# Patient Record
Sex: MALE | NOT HISPANIC OR LATINO | URBAN - METROPOLITAN AREA
[De-identification: names, ages, dates, MRNs, and addresses within clinical notes are randomized per-mention and may not be internally consistent; named-entity substitution may affect disease eponyms.]

---

## 2021-11-01 ENCOUNTER — EMERGENCY (EMERGENCY)
Facility: HOSPITAL | Age: 56
LOS: 0 days | Discharge: HOME | End: 2021-11-01
Attending: EMERGENCY MEDICINE
Payer: MEDICAID

## 2021-11-01 VITALS
TEMPERATURE: 98 F | SYSTOLIC BLOOD PRESSURE: 104 MMHG | HEIGHT: 75 IN | DIASTOLIC BLOOD PRESSURE: 80 MMHG | RESPIRATION RATE: 20 BRPM | WEIGHT: 220.02 LBS | HEART RATE: 120 BPM | OXYGEN SATURATION: 100 %

## 2021-11-01 VITALS
HEART RATE: 93 BPM | OXYGEN SATURATION: 100 % | TEMPERATURE: 100 F | SYSTOLIC BLOOD PRESSURE: 123 MMHG | DIASTOLIC BLOOD PRESSURE: 66 MMHG | RESPIRATION RATE: 17 BRPM

## 2021-11-01 DIAGNOSIS — R00.0 TACHYCARDIA, UNSPECIFIED: ICD-10-CM

## 2021-11-01 DIAGNOSIS — M54.50 LOW BACK PAIN, UNSPECIFIED: ICD-10-CM

## 2021-11-01 DIAGNOSIS — R19.00 INTRA-ABDOMINAL AND PELVIC SWELLING, MASS AND LUMP, UNSPECIFIED SITE: ICD-10-CM

## 2021-11-01 DIAGNOSIS — Z98.89 OTHER SPECIFIED POSTPROCEDURAL STATES: Chronic | ICD-10-CM

## 2021-11-01 DIAGNOSIS — N39.0 URINARY TRACT INFECTION, SITE NOT SPECIFIED: ICD-10-CM

## 2021-11-01 DIAGNOSIS — Z90.5 ACQUIRED ABSENCE OF KIDNEY: ICD-10-CM
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
## 2021-11-01 DIAGNOSIS — A59.9 TRICHOMONIASIS, UNSPECIFIED: ICD-10-CM
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
## 2021-11-01 DIAGNOSIS — Z87.442 PERSONAL HISTORY OF URINARY CALCULI: ICD-10-CM

## 2021-11-01 DIAGNOSIS — Z87.828 PERSONAL HISTORY OF OTHER (HEALED) PHYSICAL INJURY AND TRAUMA: ICD-10-CM

## 2021-11-01 LAB
ALBUMIN SERPL ELPH-MCNC: 2.9 G/DL — LOW (ref 3.3–5)
ALP SERPL-CCNC: 63 U/L — SIGNIFICANT CHANGE UP (ref 40–120)
ALT FLD-CCNC: 19 U/L — SIGNIFICANT CHANGE UP (ref 12–78)
ANION GAP SERPL CALC-SCNC: 6 MMOL/L — SIGNIFICANT CHANGE UP (ref 5–17)
APPEARANCE UR: ABNORMAL
AST SERPL-CCNC: 19 U/L — SIGNIFICANT CHANGE UP (ref 15–37)
BACTERIA # UR AUTO: ABNORMAL
BASOPHILS # BLD AUTO: 0.03 K/UL — SIGNIFICANT CHANGE UP (ref 0–0.2)
BASOPHILS NFR BLD AUTO: 0.3 % — SIGNIFICANT CHANGE UP (ref 0–2)
BILIRUB SERPL-MCNC: 0.6 MG/DL — SIGNIFICANT CHANGE UP (ref 0.2–1.2)
BILIRUB UR-MCNC: NEGATIVE — SIGNIFICANT CHANGE UP
BUN SERPL-MCNC: 58 MG/DL — HIGH (ref 7–23)
CALCIUM SERPL-MCNC: 9.1 MG/DL — SIGNIFICANT CHANGE UP (ref 8.5–10.1)
CHLORIDE SERPL-SCNC: 114 MMOL/L — HIGH (ref 96–108)
CO2 SERPL-SCNC: 20 MMOL/L — LOW (ref 22–31)
COLOR SPEC: YELLOW — SIGNIFICANT CHANGE UP
COMMENT - URINE: SIGNIFICANT CHANGE UP
CREAT SERPL-MCNC: 4.12 MG/DL — HIGH (ref 0.5–1.3)
DIFF PNL FLD: ABNORMAL
EOSINOPHIL # BLD AUTO: 0.09 K/UL — SIGNIFICANT CHANGE UP (ref 0–0.5)
EOSINOPHIL NFR BLD AUTO: 0.9 % — SIGNIFICANT CHANGE UP (ref 0–6)
EPI CELLS # UR: SIGNIFICANT CHANGE UP
GLUCOSE SERPL-MCNC: 160 MG/DL — HIGH (ref 70–99)
GLUCOSE UR QL: NEGATIVE MG/DL — SIGNIFICANT CHANGE UP
GRAN CASTS # UR COMP ASSIST: ABNORMAL /LPF
HCT VFR BLD CALC: 40.2 % — SIGNIFICANT CHANGE UP (ref 39–50)
HGB BLD-MCNC: 12.8 G/DL — LOW (ref 13–17)
IMM GRANULOCYTES NFR BLD AUTO: 0.5 % — SIGNIFICANT CHANGE UP (ref 0–1.5)
KETONES UR-MCNC: ABNORMAL
LEUKOCYTE ESTERASE UR-ACNC: ABNORMAL
LYMPHOCYTES # BLD AUTO: 0.63 K/UL — LOW (ref 1–3.3)
LYMPHOCYTES # BLD AUTO: 6.5 % — LOW (ref 13–44)
MCHC RBC-ENTMCNC: 31.8 GM/DL — LOW (ref 32–36)
MCHC RBC-ENTMCNC: 31.8 PG — SIGNIFICANT CHANGE UP (ref 27–34)
MCV RBC AUTO: 99.8 FL — SIGNIFICANT CHANGE UP (ref 80–100)
MONOCYTES # BLD AUTO: 0.69 K/UL — SIGNIFICANT CHANGE UP (ref 0–0.9)
MONOCYTES NFR BLD AUTO: 7.1 % — SIGNIFICANT CHANGE UP (ref 2–14)
NEUTROPHILS # BLD AUTO: 8.24 K/UL — HIGH (ref 1.8–7.4)
NEUTROPHILS NFR BLD AUTO: 84.7 % — HIGH (ref 43–77)
NITRITE UR-MCNC: NEGATIVE — SIGNIFICANT CHANGE UP
NRBC # BLD: 0 /100 WBCS — SIGNIFICANT CHANGE UP (ref 0–0)
PH UR: 5 — SIGNIFICANT CHANGE UP (ref 5–8)
PLATELET # BLD AUTO: 217 K/UL — SIGNIFICANT CHANGE UP (ref 150–400)
POTASSIUM SERPL-MCNC: 5.2 MMOL/L — SIGNIFICANT CHANGE UP (ref 3.5–5.3)
POTASSIUM SERPL-SCNC: 5.2 MMOL/L — SIGNIFICANT CHANGE UP (ref 3.5–5.3)
PROT SERPL-MCNC: 7.7 GM/DL — SIGNIFICANT CHANGE UP (ref 6–8.3)
PROT UR-MCNC: 100 MG/DL
RBC # BLD: 4.03 M/UL — LOW (ref 4.2–5.8)
RBC # FLD: 15.8 % — HIGH (ref 10.3–14.5)
RBC CASTS # UR COMP ASSIST: >50 /HPF (ref 0–4)
SODIUM SERPL-SCNC: 140 MMOL/L — SIGNIFICANT CHANGE UP (ref 135–145)
SP GR SPEC: 1.01 — SIGNIFICANT CHANGE UP (ref 1.01–1.02)
UROBILINOGEN FLD QL: NEGATIVE MG/DL — SIGNIFICANT CHANGE UP
WBC # BLD: 9.73 K/UL — SIGNIFICANT CHANGE UP (ref 3.8–10.5)
WBC # FLD AUTO: 9.73 K/UL — SIGNIFICANT CHANGE UP (ref 3.8–10.5)
WBC UR QL: ABNORMAL

## 2021-11-01 PROCEDURE — 93010 ELECTROCARDIOGRAM REPORT: CPT

## 2021-11-01 PROCEDURE — 74176 CT ABD & PELVIS W/O CONTRAST: CPT | Mod: 26,MA

## 2021-11-01 PROCEDURE — 99285 EMERGENCY DEPT VISIT HI MDM: CPT

## 2021-11-01 PROCEDURE — 72110 X-RAY EXAM L-2 SPINE 4/>VWS: CPT | Mod: 26

## 2021-11-01 RX ORDER — CEFTRIAXONE 500 MG/1
1000 INJECTION, POWDER, FOR SOLUTION INTRAMUSCULAR; INTRAVENOUS ONCE
Refills: 0 | Status: COMPLETED | OUTPATIENT
Start: 2021-11-01 | End: 2021-11-01

## 2021-11-01 RX ORDER — METHOCARBAMOL 500 MG/1
1000 TABLET, FILM COATED ORAL ONCE
Refills: 0 | Status: COMPLETED | OUTPATIENT
Start: 2021-11-01 | End: 2021-11-01

## 2021-11-01 RX ORDER — ACETAMINOPHEN 500 MG
650 TABLET ORAL ONCE
Refills: 0 | Status: COMPLETED | OUTPATIENT
Start: 2021-11-01 | End: 2021-11-01

## 2021-11-01 RX ORDER — CEPHALEXIN 500 MG
1 CAPSULE ORAL
Qty: 14 | Refills: 0
Start: 2021-11-01 | End: 2021-11-07

## 2021-11-01 RX ADMIN — METHOCARBAMOL 1000 MILLIGRAM(S): 500 TABLET, FILM COATED ORAL at 20:38

## 2021-11-01 RX ADMIN — Medication 650 MILLIGRAM(S): at 21:35

## 2021-11-01 RX ADMIN — Medication 650 MILLIGRAM(S): at 20:37

## 2021-11-01 RX ADMIN — CEFTRIAXONE 100 MILLIGRAM(S): 500 INJECTION, POWDER, FOR SOLUTION INTRAMUSCULAR; INTRAVENOUS at 20:37

## 2021-11-01 RX ADMIN — CEFTRIAXONE 1000 MILLIGRAM(S): 500 INJECTION, POWDER, FOR SOLUTION INTRAMUSCULAR; INTRAVENOUS at 21:35

## 2021-11-01 NOTE — ED PROVIDER NOTE - NSICDXFAMILYHX_GEN_ALL_CORE_FT
FAMILY HISTORY:  Father  Still living? Unknown  Family history of colon cancer, Age at diagnosis: Age Unknown  Family history of pancreatic cancer, Age at diagnosis: Age Unknown    Mother  Still living? Yes, Estimated age: Age Unknown  Family history of diabetes mellitus (DM), Age at diagnosis: Age Unknown    Sibling  Still living? Unknown  Family history of renal cell carcinoma, Age at diagnosis: Age Unknown

## 2021-11-01 NOTE — ED PROVIDER NOTE - NSFOLLOWUPINSTRUCTIONS_ED_ALL_ED_FT
1) Take tylenol for pain  2) Follow-up with the cancer center  3) Follow up with your primary care doctor  4) Return to the ER for worsening or concerning symptoms

## 2021-11-01 NOTE — ED ADULT NURSE NOTE - OBJECTIVE STATEMENT
55 YO M here for back pain, placed PIV sent labs and pt ambulated to and from bathroom, grunting when walking more than 25 ft. gave urine sample.  sent.
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
## 2021-11-01 NOTE — ED PROVIDER NOTE - CARE PLAN
1 Principal Discharge DX:	Back pain  Secondary Diagnosis:	UTI (urinary tract infection)  Secondary Diagnosis:	Infection due to trichomonas  Secondary Diagnosis:	Retroperitoneal mass

## 2021-11-01 NOTE — ED PROVIDER NOTE - NSFOLLOWUPCLINICS_GEN_ALL_ED_FT
[facility]  Hematology/Oncology  [address] [id number]  Phone: [phone]  Fax:
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
## 2021-11-01 NOTE — ED PROVIDER NOTE - PATIENT PORTAL LINK FT
You can access the FollowMyHealth Patient Portal offered by [facility] by registering at the following website: [email or web address]. By joining [employer]’s FollowMyHealth portal, you will also be able to view your health information using other applications (apps) compatible with our system.

## 2021-11-24 ENCOUNTER — INPATIENT (INPATIENT)
Facility: HOSPITAL | Age: 56
LOS: 5 days | Discharge: HOME | End: 2021-11-30
Attending: INTERNAL MEDICINE | Admitting: INTERNAL MEDICINE
Payer: MEDICAID

## 2021-11-24 VITALS
HEIGHT: 75 IN | TEMPERATURE: 98 F | DIASTOLIC BLOOD PRESSURE: 92 MMHG | SYSTOLIC BLOOD PRESSURE: 129 MMHG | HEART RATE: 83 BPM | OXYGEN SATURATION: 98 % | WEIGHT: 229.94 LBS | RESPIRATION RATE: 17 BRPM

## 2021-11-24 DIAGNOSIS — Z98.89 OTHER SPECIFIED POSTPROCEDURAL STATES: Chronic | ICD-10-CM

## 2021-11-24 DIAGNOSIS — R09.89 OTHER SPECIFIED SYMPTOMS AND SIGNS INVOLVING THE CIRCULATORY AND RESPIRATORY SYSTEMS: ICD-10-CM

## 2021-11-24 LAB
ALBUMIN SERPL ELPH-MCNC: 2.8 G/DL — LOW (ref 3.3–5)
ALP SERPL-CCNC: 60 U/L — SIGNIFICANT CHANGE UP (ref 40–120)
ALT FLD-CCNC: 21 U/L — SIGNIFICANT CHANGE UP (ref 12–78)
ANION GAP SERPL CALC-SCNC: 4 MMOL/L — LOW (ref 5–17)
APPEARANCE UR: ABNORMAL
APTT BLD: 138.6 SEC — CRITICAL HIGH (ref 27.5–35.5)
APTT BLD: 32.2 SEC — SIGNIFICANT CHANGE UP (ref 27.5–35.5)
AST SERPL-CCNC: 18 U/L — SIGNIFICANT CHANGE UP (ref 15–37)
BACTERIA # UR AUTO: ABNORMAL
BASOPHILS # BLD AUTO: 0.05 K/UL — SIGNIFICANT CHANGE UP (ref 0–0.2)
BASOPHILS NFR BLD AUTO: 0.6 % — SIGNIFICANT CHANGE UP (ref 0–2)
BILIRUB SERPL-MCNC: 0.3 MG/DL — SIGNIFICANT CHANGE UP (ref 0.2–1.2)
BILIRUB UR-MCNC: NEGATIVE — SIGNIFICANT CHANGE UP
BUN SERPL-MCNC: 41 MG/DL — HIGH (ref 7–23)
CALCIUM SERPL-MCNC: 8.6 MG/DL — SIGNIFICANT CHANGE UP (ref 8.5–10.1)
CHLORIDE SERPL-SCNC: 115 MMOL/L — HIGH (ref 96–108)
CO2 SERPL-SCNC: 23 MMOL/L — SIGNIFICANT CHANGE UP (ref 22–31)
COLOR SPEC: YELLOW — SIGNIFICANT CHANGE UP
CREAT SERPL-MCNC: 2.91 MG/DL — HIGH (ref 0.5–1.3)
DIFF PNL FLD: ABNORMAL
EOSINOPHIL # BLD AUTO: 0.39 K/UL — SIGNIFICANT CHANGE UP (ref 0–0.5)
EOSINOPHIL NFR BLD AUTO: 4.6 % — SIGNIFICANT CHANGE UP (ref 0–6)
EPI CELLS # UR: SIGNIFICANT CHANGE UP
FLUAV AG NPH QL: SIGNIFICANT CHANGE UP
FLUBV AG NPH QL: SIGNIFICANT CHANGE UP
GLUCOSE SERPL-MCNC: 98 MG/DL — SIGNIFICANT CHANGE UP (ref 70–99)
GLUCOSE UR QL: NEGATIVE MG/DL — SIGNIFICANT CHANGE UP
HCT VFR BLD CALC: 31.6 % — LOW (ref 39–50)
HCT VFR BLD CALC: 34.7 % — LOW (ref 39–50)
HGB BLD-MCNC: 10.7 G/DL — LOW (ref 13–17)
HGB BLD-MCNC: 9.8 G/DL — LOW (ref 13–17)
IMM GRANULOCYTES NFR BLD AUTO: 0.4 % — SIGNIFICANT CHANGE UP (ref 0–1.5)
INR BLD: 1.12 RATIO — SIGNIFICANT CHANGE UP (ref 0.88–1.16)
KETONES UR-MCNC: NEGATIVE — SIGNIFICANT CHANGE UP
LEUKOCYTE ESTERASE UR-ACNC: ABNORMAL
LYMPHOCYTES # BLD AUTO: 1.59 K/UL — SIGNIFICANT CHANGE UP (ref 1–3.3)
LYMPHOCYTES # BLD AUTO: 18.6 % — SIGNIFICANT CHANGE UP (ref 13–44)
MAGNESIUM SERPL-MCNC: 2.2 MG/DL — SIGNIFICANT CHANGE UP (ref 1.6–2.6)
MCHC RBC-ENTMCNC: 30.8 GM/DL — LOW (ref 32–36)
MCHC RBC-ENTMCNC: 31 GM/DL — LOW (ref 32–36)
MCHC RBC-ENTMCNC: 31.5 PG — SIGNIFICANT CHANGE UP (ref 27–34)
MCHC RBC-ENTMCNC: 31.5 PG — SIGNIFICANT CHANGE UP (ref 27–34)
MCV RBC AUTO: 101.6 FL — HIGH (ref 80–100)
MCV RBC AUTO: 102.1 FL — HIGH (ref 80–100)
MONOCYTES # BLD AUTO: 0.65 K/UL — SIGNIFICANT CHANGE UP (ref 0–0.9)
MONOCYTES NFR BLD AUTO: 7.6 % — SIGNIFICANT CHANGE UP (ref 2–14)
NEUTROPHILS # BLD AUTO: 5.85 K/UL — SIGNIFICANT CHANGE UP (ref 1.8–7.4)
NEUTROPHILS NFR BLD AUTO: 68.2 % — SIGNIFICANT CHANGE UP (ref 43–77)
NITRITE UR-MCNC: NEGATIVE — SIGNIFICANT CHANGE UP
NRBC # BLD: 0 /100 WBCS — SIGNIFICANT CHANGE UP (ref 0–0)
NRBC # BLD: 0 /100 WBCS — SIGNIFICANT CHANGE UP (ref 0–0)
NT-PROBNP SERPL-SCNC: 56 PG/ML — SIGNIFICANT CHANGE UP (ref 0–125)
PH UR: 5 — SIGNIFICANT CHANGE UP (ref 5–8)
PLATELET # BLD AUTO: 362 K/UL — SIGNIFICANT CHANGE UP (ref 150–400)
PLATELET # BLD AUTO: 372 K/UL — SIGNIFICANT CHANGE UP (ref 150–400)
POTASSIUM SERPL-MCNC: 5.9 MMOL/L — HIGH (ref 3.5–5.3)
POTASSIUM SERPL-SCNC: 5.9 MMOL/L — HIGH (ref 3.5–5.3)
PROT SERPL-MCNC: 7.3 GM/DL — SIGNIFICANT CHANGE UP (ref 6–8.3)
PROT UR-MCNC: 100 MG/DL
PROTHROM AB SERPL-ACNC: 12.9 SEC — SIGNIFICANT CHANGE UP (ref 10.6–13.6)
RBC # BLD: 3.11 M/UL — LOW (ref 4.2–5.8)
RBC # BLD: 3.4 M/UL — LOW (ref 4.2–5.8)
RBC # FLD: 15.4 % — HIGH (ref 10.3–14.5)
RBC # FLD: 15.5 % — HIGH (ref 10.3–14.5)
RBC CASTS # UR COMP ASSIST: >50 /HPF (ref 0–4)
SARS-COV-2 RNA SPEC QL NAA+PROBE: SIGNIFICANT CHANGE UP
SODIUM SERPL-SCNC: 142 MMOL/L — SIGNIFICANT CHANGE UP (ref 135–145)
SP GR SPEC: 1.01 — SIGNIFICANT CHANGE UP (ref 1.01–1.02)
TROPONIN I, HIGH SENSITIVITY RESULT: 7.2 NG/L — SIGNIFICANT CHANGE UP
UROBILINOGEN FLD QL: NEGATIVE MG/DL — SIGNIFICANT CHANGE UP
WBC # BLD: 6.76 K/UL — SIGNIFICANT CHANGE UP (ref 3.8–10.5)
WBC # BLD: 8.56 K/UL — SIGNIFICANT CHANGE UP (ref 3.8–10.5)
WBC # FLD AUTO: 6.76 K/UL — SIGNIFICANT CHANGE UP (ref 3.8–10.5)
WBC # FLD AUTO: 8.56 K/UL — SIGNIFICANT CHANGE UP (ref 3.8–10.5)
WBC UR QL: ABNORMAL

## 2021-11-24 PROCEDURE — 99285 EMERGENCY DEPT VISIT HI MDM: CPT

## 2021-11-24 PROCEDURE — 99222 1ST HOSP IP/OBS MODERATE 55: CPT

## 2021-11-24 PROCEDURE — 71045 X-RAY EXAM CHEST 1 VIEW: CPT | Mod: 26

## 2021-11-24 PROCEDURE — 93970 EXTREMITY STUDY: CPT | Mod: 26

## 2021-11-24 PROCEDURE — 93010 ELECTROCARDIOGRAM REPORT: CPT

## 2021-11-24 RX ORDER — ACETAMINOPHEN 500 MG
650 TABLET ORAL EVERY 6 HOURS
Refills: 0 | Status: DISCONTINUED | OUTPATIENT
Start: 2021-11-24 | End: 2021-11-30

## 2021-11-24 RX ORDER — HEPARIN SODIUM 5000 [USP'U]/ML
INJECTION INTRAVENOUS; SUBCUTANEOUS
Qty: 25000 | Refills: 0 | Status: DISCONTINUED | OUTPATIENT
Start: 2021-11-24 | End: 2021-11-24

## 2021-11-24 RX ORDER — METOPROLOL TARTRATE 50 MG
25 TABLET ORAL DAILY
Refills: 0 | Status: DISCONTINUED | OUTPATIENT
Start: 2021-11-24 | End: 2021-11-24

## 2021-11-24 RX ORDER — ASPIRIN/CALCIUM CARB/MAGNESIUM 324 MG
81 TABLET ORAL DAILY
Refills: 0 | Status: DISCONTINUED | OUTPATIENT
Start: 2021-11-24 | End: 2021-11-30

## 2021-11-24 RX ORDER — HEPARIN SODIUM 5000 [USP'U]/ML
8500 INJECTION INTRAVENOUS; SUBCUTANEOUS EVERY 6 HOURS
Refills: 0 | Status: DISCONTINUED | OUTPATIENT
Start: 2021-11-24 | End: 2021-11-24

## 2021-11-24 RX ORDER — HEPARIN SODIUM 5000 [USP'U]/ML
8500 INJECTION INTRAVENOUS; SUBCUTANEOUS ONCE
Refills: 0 | Status: COMPLETED | OUTPATIENT
Start: 2021-11-24 | End: 2021-11-24

## 2021-11-24 RX ORDER — HEPARIN SODIUM 5000 [USP'U]/ML
4000 INJECTION INTRAVENOUS; SUBCUTANEOUS EVERY 6 HOURS
Refills: 0 | Status: DISCONTINUED | OUTPATIENT
Start: 2021-11-24 | End: 2021-11-24

## 2021-11-24 RX ORDER — SODIUM POLYSTYRENE SULFONATE 4.1 MEQ/G
30 POWDER, FOR SUSPENSION ORAL ONCE
Refills: 0 | Status: COMPLETED | OUTPATIENT
Start: 2021-11-24 | End: 2021-11-24

## 2021-11-24 RX ADMIN — HEPARIN SODIUM 1800 UNIT(S)/HR: 5000 INJECTION INTRAVENOUS; SUBCUTANEOUS at 17:21

## 2021-11-24 RX ADMIN — SODIUM POLYSTYRENE SULFONATE 30 GRAM(S): 4.1 POWDER, FOR SUSPENSION ORAL at 20:12

## 2021-11-24 RX ADMIN — HEPARIN SODIUM 8500 UNIT(S): 5000 INJECTION INTRAVENOUS; SUBCUTANEOUS at 17:18
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
## 2021-11-24 NOTE — ED PROVIDER NOTE - PHYSICAL EXAMINATION
Gen: Alert, Well appearing. NAD    Head: NC, AT, PERRL, normal lids/conjunctiva   ENT: patent oropharynx without erythema/exudate, uvula midline  Neck: supple, no tenderness/meningismus  Pulm: Bilateral clear BS, normal resp effort  CV: RRR, no M/R/G, +dist pulses   Abd: soft, NT/ND, +BS, no guarding/rebound tenderness  Mskel: ++LLE swelling up to groin. + Dp/Pt pulses.   Skin: no rash, no bruising  Neuro: AAOx3, no sensory/motor deficits, CN 2-12 intact

## 2021-11-24 NOTE — ED PROVIDER NOTE - CLINICAL SUMMARY MEDICAL DECISION MAKING FREE TEXT BOX
Pt with extensive DVT, aware that pt has IVC filter, but given new onset SOFIA and probable metastatic CA, pt at high risk of PE. will admit. d/w dr duenas for admission.

## 2021-11-24 NOTE — ED ADULT TRIAGE NOTE - HEIGHT IN FEET
Problem: Potential for Falls  Goal: Patient will remain free of falls  INTERVENTIONS:  - Assess patient frequently for physical needs  -  Identify cognitive and physical deficits and behaviors that affect risk of falls    -  [place] fall precautions as indicated by assessment   - Educate patient/family on patient safety including physical limitations  - Instruct patient to call for assistance with activity based on assessment  - Modify environment to reduce risk of injury  - Consider OT/PT consult to assist with strengthening/mobility   Outcome: Progressing
6

## 2021-11-24 NOTE — ED ADULT TRIAGE NOTE - CHIEF COMPLAINT QUOTE
Patient is a  c/o swelling from left  thigh down to ankle since almost 2 weeks , he denies pains or sob.

## 2021-11-24 NOTE — H&P ADULT - NSHPPHYSICALEXAM_GEN_ALL_CORE
Gen: NAD, AAOx3  CVS: S1, S2, reg  Lungs, CTA b/l  Abd: soft, n/t, BS +  extremities, B/L LE edema  Neuro: AAOx3, non focal  Extr: b/l LE edema

## 2021-11-24 NOTE — H&P ADULT - ASSESSMENT
55 y/o M w/ b/L LE extensive dvt and ckd, ?metastatic dis. hx kidney tumor s/p nephrectomy   past hx dvt s/p IVC filter, s/p coumadin x 6 month - 4 yr ago    Admit to Monitored bed    Acute DVt b/l LE/ ?metastatic malignancy   heparin gtt  Monitor PTT  Hem consult/ Dr. Branham  Check VQ scan to r/o PE  resp. status stable on RA O2 sat normal    BLANCHE on CKD vs CKD/ hyperkalemia  kayexalate- fu bmp in am  renal consult- Dr. rodriguez     55 y/o M w/ b/L LE extensive dvt and ckd, ?metastatic dis. hx kidney tumor s/p nephrectomy   past hx dvt s/p IVC filter, s/p coumadin x 6 month - 4 yr ago    Admit to Monitored bed    Acute DVt b/l LE/ ?metastatic malignancy based on recent CT scan from 11/1- hx right nephrectomy for renal mass.   heparin gtt  Monitor PTT  Hem consult/ Dr. Branham  Check VQ scan to r/o PE  resp. status stable on RA O2 sat normal  pt in agreement to start AC , risk vs benefits of AC d/w him, he is aware about risk for bleeding     BLANCHE on CKD vs CKD/ hyperkalemia  kayexalate- fu bmp in am  renal consult- Dr. rodriguez      Pt with b/l dvt- heparin gtt for dvt ppx.

## 2021-11-24 NOTE — ED ADULT NURSE NOTE - OBJECTIVE STATEMENT
Pt c/o sudden left leg swelling that started last week. +4 pitting edema to left leg noted. Patient denies pain and trauma to legs. Denies chest pain, SOB. Sx of right nephrectomy. PMH HTN, blood clot right leg.

## 2021-11-25 DIAGNOSIS — C64.9 MALIGNANT NEOPLASM OF UNSPECIFIED KIDNEY, EXCEPT RENAL PELVIS: ICD-10-CM

## 2021-11-25 DIAGNOSIS — I82.409 ACUTE EMBOLISM AND THROMBOSIS OF UNSPECIFIED DEEP VEINS OF UNSPECIFIED LOWER EXTREMITY: ICD-10-CM

## 2021-11-25 DIAGNOSIS — D63.8 ANEMIA IN OTHER CHRONIC DISEASES CLASSIFIED ELSEWHERE: ICD-10-CM

## 2021-11-25 DIAGNOSIS — N28.9 DISORDER OF KIDNEY AND URETER, UNSPECIFIED: ICD-10-CM

## 2021-11-25 DIAGNOSIS — I10 ESSENTIAL (PRIMARY) HYPERTENSION: ICD-10-CM

## 2021-11-25 LAB
ANION GAP SERPL CALC-SCNC: 6 MMOL/L — SIGNIFICANT CHANGE UP (ref 5–17)
APTT BLD: 73.7 SEC — HIGH (ref 27.5–35.5)
APTT BLD: 76.3 SEC — HIGH (ref 27.5–35.5)
BUN SERPL-MCNC: 36 MG/DL — HIGH (ref 7–23)
CALCIUM SERPL-MCNC: 8.4 MG/DL — LOW (ref 8.5–10.1)
CHLORIDE SERPL-SCNC: 118 MMOL/L — HIGH (ref 96–108)
CO2 SERPL-SCNC: 22 MMOL/L — SIGNIFICANT CHANGE UP (ref 22–31)
COVID-19 NUCLEOCAPSID GAM AB INTERP: NEGATIVE — SIGNIFICANT CHANGE UP
COVID-19 NUCLEOCAPSID TOTAL GAM ANTIBODY RESULT: 0.07 INDEX — SIGNIFICANT CHANGE UP
COVID-19 SPIKE DOMAIN AB INTERP: POSITIVE
COVID-19 SPIKE DOMAIN ANTIBODY RESULT: 161 U/ML — HIGH
CREAT SERPL-MCNC: 2.56 MG/DL — HIGH (ref 0.5–1.3)
CULTURE RESULTS: SIGNIFICANT CHANGE UP
GLUCOSE SERPL-MCNC: 94 MG/DL — SIGNIFICANT CHANGE UP (ref 70–99)
HCT VFR BLD CALC: 30.5 % — LOW (ref 39–50)
HCV AB S/CO SERPL IA: 0.11 S/CO — SIGNIFICANT CHANGE UP (ref 0–0.99)
HCV AB SERPL-IMP: SIGNIFICANT CHANGE UP
HGB BLD-MCNC: 9.4 G/DL — LOW (ref 13–17)
INR BLD: 1.23 RATIO — HIGH (ref 0.88–1.16)
MCHC RBC-ENTMCNC: 30.8 GM/DL — LOW (ref 32–36)
MCHC RBC-ENTMCNC: 31.4 PG — SIGNIFICANT CHANGE UP (ref 27–34)
MCV RBC AUTO: 102 FL — HIGH (ref 80–100)
NRBC # BLD: 0 /100 WBCS — SIGNIFICANT CHANGE UP (ref 0–0)
PLATELET # BLD AUTO: 345 K/UL — SIGNIFICANT CHANGE UP (ref 150–400)
POTASSIUM SERPL-MCNC: 4.7 MMOL/L — SIGNIFICANT CHANGE UP (ref 3.5–5.3)
POTASSIUM SERPL-SCNC: 4.7 MMOL/L — SIGNIFICANT CHANGE UP (ref 3.5–5.3)
PROTHROM AB SERPL-ACNC: 14.1 SEC — HIGH (ref 10.6–13.6)
RBC # BLD: 2.99 M/UL — LOW (ref 4.2–5.8)
RBC # FLD: 15.4 % — HIGH (ref 10.3–14.5)
SARS-COV-2 IGG+IGM SERPL QL IA: 0.07 INDEX — SIGNIFICANT CHANGE UP
SARS-COV-2 IGG+IGM SERPL QL IA: 161 U/ML — HIGH
SARS-COV-2 IGG+IGM SERPL QL IA: NEGATIVE — SIGNIFICANT CHANGE UP
SARS-COV-2 IGG+IGM SERPL QL IA: POSITIVE
SODIUM SERPL-SCNC: 146 MMOL/L — HIGH (ref 135–145)
SPECIMEN SOURCE: SIGNIFICANT CHANGE UP
WBC # BLD: 6.71 K/UL — SIGNIFICANT CHANGE UP (ref 3.8–10.5)
WBC # FLD AUTO: 6.71 K/UL — SIGNIFICANT CHANGE UP (ref 3.8–10.5)

## 2021-11-25 PROCEDURE — 71250 CT THORAX DX C-: CPT | Mod: 26

## 2021-11-25 PROCEDURE — 76775 US EXAM ABDO BACK WALL LIM: CPT | Mod: 26

## 2021-11-25 PROCEDURE — 74176 CT ABD & PELVIS W/O CONTRAST: CPT | Mod: 26

## 2021-11-25 PROCEDURE — 99233 SBSQ HOSP IP/OBS HIGH 50: CPT

## 2021-11-25 RX ORDER — HEPARIN SODIUM 5000 [USP'U]/ML
9000 INJECTION INTRAVENOUS; SUBCUTANEOUS EVERY 6 HOURS
Refills: 0 | Status: DISCONTINUED | OUTPATIENT
Start: 2021-11-24 | End: 2021-11-26

## 2021-11-25 RX ORDER — HEPARIN SODIUM 5000 [USP'U]/ML
1500 INJECTION INTRAVENOUS; SUBCUTANEOUS
Qty: 25000 | Refills: 0 | Status: DISCONTINUED | OUTPATIENT
Start: 2021-11-24 | End: 2021-11-26

## 2021-11-25 RX ORDER — SODIUM CHLORIDE 9 MG/ML
1000 INJECTION, SOLUTION INTRAVENOUS
Refills: 0 | Status: DISCONTINUED | OUTPATIENT
Start: 2021-11-25 | End: 2021-11-25

## 2021-11-25 RX ORDER — HEPARIN SODIUM 5000 [USP'U]/ML
4500 INJECTION INTRAVENOUS; SUBCUTANEOUS EVERY 6 HOURS
Refills: 0 | Status: DISCONTINUED | OUTPATIENT
Start: 2021-11-24 | End: 2021-11-26

## 2021-11-25 RX ORDER — AMLODIPINE BESYLATE 2.5 MG/1
5 TABLET ORAL DAILY
Refills: 0 | Status: DISCONTINUED | OUTPATIENT
Start: 2021-11-25 | End: 2021-11-30

## 2021-11-25 RX ADMIN — AMLODIPINE BESYLATE 5 MILLIGRAM(S): 2.5 TABLET ORAL at 12:26

## 2021-11-25 RX ADMIN — Medication 81 MILLIGRAM(S): at 11:03

## 2021-11-25 RX ADMIN — HEPARIN SODIUM 1500 UNIT(S)/HR: 5000 INJECTION INTRAVENOUS; SUBCUTANEOUS at 01:02

## 2021-11-25 RX ADMIN — HEPARIN SODIUM 1500 UNIT(S)/HR: 5000 INJECTION INTRAVENOUS; SUBCUTANEOUS at 07:55

## 2021-11-25 RX ADMIN — HEPARIN SODIUM 1500 UNIT(S)/HR: 5000 INJECTION INTRAVENOUS; SUBCUTANEOUS at 15:51

## 2021-11-25 NOTE — PROVIDER CONTACT NOTE (CRITICAL VALUE NOTIFICATION) - ACTION/TREATMENT ORDERED:
follow heparin nomogram. hold for 1 hour and decrease by 3ml. currently running at 18 and will be decreased to 15ml in one hour

## 2021-11-25 NOTE — CONSULT NOTE ADULT - PROBLEM SELECTOR RECOMMENDATION 9
- DVT on Heparin Drip  - CT C/A/P  - Patient need biopsy of metastaic disease to confirm diagnosis and if metastatic RCC which type  - suggest vascualr evaluation  - Supportive care

## 2021-11-25 NOTE — PROGRESS NOTE ADULT - PROBLEM SELECTOR PLAN 1
continue heparin drip for now until evaluation for possible switch to DOACs since patient has renal insufficiency.  Evaluate for possible renal biopsy.  VQscan to r/o pulmonary embolism.

## 2021-11-25 NOTE — PROGRESS NOTE ADULT - PROBLEM SELECTOR PLAN 2
Will challenge with gentle fluid hydration, likely chronic in nature due to h/o right kidney resection and HTN.  Renal consult requested for possible biopsy.  F/U on US kidney and Non Contrast Abd / Pelvis results. Likely chronic in nature due to h/o right kidney resection and HTN.  Cr 2.91 --> 256 slight improvement.  Renal consult requested for possible biopsy.  F/U on US kidney and Non Contrast Abd / Pelvis results.

## 2021-11-25 NOTE — PROGRESS NOTE ADULT - SUBJECTIVE AND OBJECTIVE BOX
Patient is a 56y old  Male who presents with a chief complaint of Leg edema (2021 10:51)    today patient seen and examined at bedside.  Patient denies any sob, states that his legs are still swollen, denies any chest pain, abdominal distention or pain or nausea / vomiting.  Patient denies any back pain or loss of bowel or bladder sphincter control.    SUBJECTIVE & OBJECTIVE: Pt seen and examined at bedside.   PHYSICAL EXAM:  Vital Signs Last 24 Hrs  T(C): 36.8 (2021 10:56), Max: 36.8 (2021 10:56)  T(F): 98.2 (2021 10:56), Max: 98.2 (2021 10:56)  HR: 71 (2021 10:56) (66 - 83)  BP: 162/63 (2021 10:56) (105/63 - 162/63)  BP(mean): --  RR: 17 (2021 10:56) (16 - 18)  SpO2: 100% (2021 10:56) (98% - 100%)   Daily Height in cm: 190.5 (2021 20:52)    Daily I&O's Detail    2021 07:01  -  2021 11:52  --------------------------------------------------------  IN:    Oral Fluid: 413 mL  Total IN: 413 mL  OUT:  Total OUT: 0 mL  Total NET: 413 mL    MEDICATIONS  (STANDING):  aspirin  chewable 81 milliGRAM(s) Oral daily  heparin  Infusion. 1500 Unit(s)/Hr (15 mL/Hr) IV Continuous <Continuous>    MEDICATIONS  (PRN):  acetaminophen     Tablet .. 650 milliGRAM(s) Oral every 6 hours PRN Temp greater or equal to 38C (100.4F), Mild Pain (1 - 3)  heparin   Injectable 9000 Unit(s) IV Push every 6 hours PRN For aPTT less than 40  heparin   Injectable 4500 Unit(s) IV Push every 6 hours PRN For aPTT between 40 - 57    Patient is awake and alert, calm and comfortable, able to speak in full sentences.    GENERAL: NAD, well-groomed, well-developed  HEAD:  Atraumatic, Normocephalic  EYES: EOMI, PERRLA, conjunctiva and sclera clear  ENMT: Moist mucous membranes  NECK: Supple, No JVD  NERVOUS SYSTEM:  Alert & Oriented X3, Motor Strength 5/5 B/L upper and lower extremities; DTRs 2+ intact and symmetric  CHEST/LUNG: Clear to auscultation bilaterally; No rales, rhonchi, wheezing, or rubs  HEART: Regular rate and rhythm; No murmurs, rubs, or gallops  ABDOMEN: Soft, Nontender, Nondistended; Bowel sounds present  EXTREMITIES:  2+ Peripheral Pulses, No clubbing, cyanosis, + 2 edema left greater than right  LABS:                        9.4    6.71  )-----------( 345      ( 2021 07:24 )             30.5   11-    146<H>  |  118<H>  |  36<H>  ----------------------------<  94  4.7   |  22  |  2.56<H>    Ca    8.4<L>      2021 07:24  Mg     2.2     11-24    TPro  7.3  /  Alb  2.8<L>  /  TBili  0.3  /  DBili  x   /  AST  18  /  ALT  21  /  AlkPhos  60  11-24    PT/INR - ( 2021 07:24 )   PT: 14.1 sec;   INR: 1.23 ratio    PTT - ( 2021 07:24 )  PTT:73.7 secUrinalysis Basic - ( 2021 16:36 )    Color: Yellow / Appearance: very cloudy / S.015 / pH: x  Gluc: x / Ketone: Negative  / Bili: Negative / Urobili: Negative mg/dL   Blood: x / Protein: 100 mg/dL / Nitrite: Negative   Leuk Esterase: Small / RBC: >50 /HPF / WBC 11-25   Sq Epi: x / Non Sq Epi: Few / Bacteria: Few

## 2021-11-25 NOTE — PROGRESS NOTE ADULT - ASSESSMENT
57 yo man with h/o right kidney cancer and s/p right nephrectomy 5 years ago now presents with bilateral LE swelling and DVT as well as renal insufficiency. Will need work up to r/o renal cell carcinoma.
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
## 2021-11-26 ENCOUNTER — RESULT REVIEW (OUTPATIENT)
Age: 56
End: 2021-11-26

## 2021-11-26 LAB
ALBUMIN SERPL ELPH-MCNC: 2.5 G/DL — LOW (ref 3.3–5)
ALP SERPL-CCNC: 54 U/L — SIGNIFICANT CHANGE UP (ref 40–120)
ALT FLD-CCNC: 18 U/L — SIGNIFICANT CHANGE UP (ref 12–78)
ANION GAP SERPL CALC-SCNC: 5 MMOL/L — SIGNIFICANT CHANGE UP (ref 5–17)
APTT BLD: 30.4 SEC — SIGNIFICANT CHANGE UP (ref 27.5–35.5)
APTT BLD: 73.2 SEC — HIGH (ref 27.5–35.5)
AST SERPL-CCNC: 14 U/L — LOW (ref 15–37)
BILIRUB SERPL-MCNC: 0.4 MG/DL — SIGNIFICANT CHANGE UP (ref 0.2–1.2)
BUN SERPL-MCNC: 37 MG/DL — HIGH (ref 7–23)
CALCIUM SERPL-MCNC: 8.3 MG/DL — LOW (ref 8.5–10.1)
CHLORIDE SERPL-SCNC: 118 MMOL/L — HIGH (ref 96–108)
CO2 SERPL-SCNC: 23 MMOL/L — SIGNIFICANT CHANGE UP (ref 22–31)
CREAT SERPL-MCNC: 2.64 MG/DL — HIGH (ref 0.5–1.3)
GLUCOSE SERPL-MCNC: 98 MG/DL — SIGNIFICANT CHANGE UP (ref 70–99)
HCT VFR BLD CALC: 30.2 % — LOW (ref 39–50)
HGB BLD-MCNC: 9.5 G/DL — LOW (ref 13–17)
MCHC RBC-ENTMCNC: 31.5 GM/DL — LOW (ref 32–36)
MCHC RBC-ENTMCNC: 31.6 PG — SIGNIFICANT CHANGE UP (ref 27–34)
MCV RBC AUTO: 100.3 FL — HIGH (ref 80–100)
NRBC # BLD: 0 /100 WBCS — SIGNIFICANT CHANGE UP (ref 0–0)
OB PNL STL: NEGATIVE — SIGNIFICANT CHANGE UP
PLATELET # BLD AUTO: 344 K/UL — SIGNIFICANT CHANGE UP (ref 150–400)
POTASSIUM SERPL-MCNC: 4.7 MMOL/L — SIGNIFICANT CHANGE UP (ref 3.5–5.3)
POTASSIUM SERPL-SCNC: 4.7 MMOL/L — SIGNIFICANT CHANGE UP (ref 3.5–5.3)
PROT SERPL-MCNC: 6.3 GM/DL — SIGNIFICANT CHANGE UP (ref 6–8.3)
RBC # BLD: 3.01 M/UL — LOW (ref 4.2–5.8)
RBC # FLD: 15.3 % — HIGH (ref 10.3–14.5)
SODIUM SERPL-SCNC: 146 MMOL/L — HIGH (ref 135–145)
URATE SERPL-MCNC: 7.9 MG/DL — SIGNIFICANT CHANGE UP (ref 3.4–8.8)
WBC # BLD: 6.88 K/UL — SIGNIFICANT CHANGE UP (ref 3.8–10.5)
WBC # FLD AUTO: 6.88 K/UL — SIGNIFICANT CHANGE UP (ref 3.8–10.5)

## 2021-11-26 PROCEDURE — 88305 TISSUE EXAM BY PATHOLOGIST: CPT | Mod: 26

## 2021-11-26 PROCEDURE — 99233 SBSQ HOSP IP/OBS HIGH 50: CPT

## 2021-11-26 PROCEDURE — 88333 PATH CONSLTJ SURG CYTO XM 1: CPT | Mod: 26

## 2021-11-26 PROCEDURE — 78582 LUNG VENTILAT&PERFUS IMAGING: CPT | Mod: 26

## 2021-11-26 PROCEDURE — 71045 X-RAY EXAM CHEST 1 VIEW: CPT | Mod: 26

## 2021-11-26 RX ORDER — HEPARIN SODIUM 5000 [USP'U]/ML
1500 INJECTION INTRAVENOUS; SUBCUTANEOUS
Qty: 25000 | Refills: 0 | Status: DISCONTINUED | OUTPATIENT
Start: 2021-11-27 | End: 2021-11-27

## 2021-11-26 RX ORDER — HEPARIN SODIUM 5000 [USP'U]/ML
4500 INJECTION INTRAVENOUS; SUBCUTANEOUS EVERY 6 HOURS
Refills: 0 | Status: DISCONTINUED | OUTPATIENT
Start: 2021-11-27 | End: 2021-11-27

## 2021-11-26 RX ORDER — WARFARIN SODIUM 2.5 MG/1
5 TABLET ORAL ONCE
Refills: 0 | Status: COMPLETED | OUTPATIENT
Start: 2021-11-26 | End: 2021-11-26

## 2021-11-26 RX ORDER — HEPARIN SODIUM 5000 [USP'U]/ML
9000 INJECTION INTRAVENOUS; SUBCUTANEOUS EVERY 6 HOURS
Refills: 0 | Status: DISCONTINUED | OUTPATIENT
Start: 2021-11-27 | End: 2021-11-27

## 2021-11-26 RX ORDER — SODIUM CHLORIDE 9 MG/ML
1000 INJECTION, SOLUTION INTRAVENOUS
Refills: 0 | Status: DISCONTINUED | OUTPATIENT
Start: 2021-11-26 | End: 2021-11-27

## 2021-11-26 RX ADMIN — SODIUM CHLORIDE 125 MILLILITER(S): 9 INJECTION, SOLUTION INTRAVENOUS at 19:40

## 2021-11-26 RX ADMIN — AMLODIPINE BESYLATE 5 MILLIGRAM(S): 2.5 TABLET ORAL at 05:42

## 2021-11-26 RX ADMIN — WARFARIN SODIUM 5 MILLIGRAM(S): 2.5 TABLET ORAL at 21:09

## 2021-11-26 NOTE — PROGRESS NOTE ADULT - SUBJECTIVE AND OBJECTIVE BOX
Patient is a 56y old  Male who presents with a chief complaint of Leg edema (26 Nov 2021 09:41)    No events overnight.  Patient denies any back pain, chest pain, sob or abdominal pain, states that his leg are still swollen.  Remaining ROS unremarkable except per above.  SUBJECTIVE & OBJECTIVE: Pt seen and examined at bedside.   PHYSICAL EXAM:  Vital Signs Last 24 Hrs  T(C): 36.8 (26 Nov 2021 16:16), Max: 36.8 (26 Nov 2021 16:16)  T(F): 98.2 (26 Nov 2021 16:16), Max: 98.2 (26 Nov 2021 16:16)  HR: 69 (26 Nov 2021 16:16) (65 - 75)  BP: 122/79 (26 Nov 2021 16:16) (114/79 - 129/84)  BP(mean): --  RR: 18 (26 Nov 2021 16:16) (18 - 18)  SpO2: 99% (26 Nov 2021 16:16) (97% - 99%)   Daily     Daily I&O's Detail    25 Nov 2021 07:01  -  26 Nov 2021 07:00  --------------------------------------------------------  IN:    Oral Fluid: 833 mL  Total IN: 833 mL  OUT:  Total OUT: 0 mL  Total NET: 833 mL  MEDICATIONS  (STANDING):  amLODIPine   Tablet 5 milliGRAM(s) Oral daily  aspirin  chewable 81 milliGRAM(s) Oral daily  sodium chloride 0.45%. 1000 milliLiter(s) (125 mL/Hr) IV Continuous <Continuous>  warfarin 5 milliGRAM(s) Oral once    MEDICATIONS  (PRN):  acetaminophen     Tablet .. 650 milliGRAM(s) Oral every 6 hours PRN Temp greater or equal to 38C (100.4F), Mild Pain (1 - 3)    Patient sitting in bed, calm and comfortable.  Patient is alert and oriented.  GENERAL: NAD, well-groomed, well-developed  HEAD:  Atraumatic, Normocephalic  EYES: EOMI, PERRLA, conjunctiva and sclera clear  ENMT: Moist mucous membranes  NECK: Supple, No JVD  NERVOUS SYSTEM:  Alert & Oriented X3, Motor Strength 5/5 B/L upper and lower extremities; DTRs 2+ intact and symmetric  CHEST/LUNG: Clear to auscultation bilaterally; No rales, rhonchi, wheezing, or rubs  HEART: Regular rate and rhythm; No murmurs, rubs, or gallops  ABDOMEN: Soft, Nontender, Nondistended; Bowel sounds present  EXTREMITIES:  2+ Peripheral Pulses, No clubbing, cyanosis, + 2 edema  LABS:                        9.5    6.88  )-----------( 344      ( 26 Nov 2021 06:42 )             30.2   PT/INR - ( 25 Nov 2021 07:24 )   PT: 14.1 sec;   INR: 1.23 ratio         PTT - ( 26 Nov 2021 06:42 )  PTT:73.2 secCAPILLARY BLOOD GLUCOSE          Culture - Urine (collected 25 Nov 2021 00:55)  Source: Clean Catch Clean Catch (Midstream)  Final Report (25 Nov 2021 20:18):    <10,000 CFU/mL Normal Urogenital Rebecca        RADIOLOGY & ADDITIONAL TESTS:

## 2021-11-26 NOTE — PROGRESS NOTE ADULT - SUBJECTIVE AND OBJECTIVE BOX
[facility] NEPHROLOGY SERVICES, [facility]  NEPHROLOGY AND HYPERTENSION  [address]  [address]  [place] [address]  [phone]    MD ERIN HOOPER, MD BECCA MCNEIL, MD JOSE MCCLELLAN, MD QUANG VALENTINO, MD ESVIN NARAYANAN, MD LISSA BUSBY MD          Patient events noted    MEDICATIONS  (STANDING):  amLODIPine   Tablet 5 milliGRAM(s) Oral daily  aspirin  chewable 81 milliGRAM(s) Oral daily  sodium chloride 0.45%. 1000 milliLiter(s) (125 mL/Hr) IV Continuous <Continuous>    MEDICATIONS  (PRN):  acetaminophen     Tablet .. 650 milliGRAM(s) Oral every 6 hours PRN Temp greater or equal to 38C (100.4F), Mild Pain (1 - 3)      11-25-21 @ 07:01  -  11-26-21 @ 07:00  --------------------------------------------------------  IN: 833 mL / OUT: 0 mL / NET: 833 mL    11-26-21 @ 07:01  -  11-26-21 @ 22:34  --------------------------------------------------------  IN: 420 mL / OUT: 900 mL / NET: -480 mL      PHYSICAL EXAM:      T(C): 36.8 (11-26-21 @ 16:16), Max: 36.8 (11-26-21 @ 16:16)  HR: 69 (11-26-21 @ 16:16) (65 - 75)  BP: 122/79 (11-26-21 @ 16:16) (114/79 - 129/84)  RR: 18 (11-26-21 @ 16:16) (18 - 18)  SpO2: 99% (11-26-21 @ 16:16) (97% - 99%)  Wt(kg): --  Lungs clear  Heart S1S2  Abd soft NT ND  Extremities:   tr edema                                    9.5    6.88  )-----------( 344      ( 26 Nov 2021 06:42 )             30.2     11-26    146<H>  |  118<H>  |  37<H>  ----------------------------<  98  4.7   |  23  |  2.64<H>    Ca    8.3<L>      26 Nov 2021 06:42    TPro  6.3  /  Alb  2.5<L>  /  TBili  0.4  /  DBili  x   /  AST  14<L>  /  ALT  18  /  AlkPhos  54  11-26      LIVER FUNCTIONS - ( 26 Nov 2021 06:42 )  Alb: 2.5 g/dL / Pro: 6.3 gm/dL / ALK PHOS: 54 U/L / ALT: 18 U/L / AST: 14 U/L / GGT: x           Creatinine Trend: 2.64<--, 2.56<--, 2.91<--, 4.12<--    Uric Acid, Serum (11.26.21 @ 10:36)    Uric Acid, Serum: 7.9 mg/dL        Assessment   BLANCHE CKD 3-4; solitary kidney; suspected recurrent renal malignancy ; pre / post renal azotemia   Risk for renal vein thrombosis   Renal indices stable     Plan  Supportive care  Follow biopsy results       Elliott Kuhn MD

## 2021-11-26 NOTE — PROGRESS NOTE ADULT - PROBLEM SELECTOR PLAN 2
Likely chronic in nature due to h/o right kidney resection and HTN.  Cr 2.91 --> 256 slight improvement.  Renal consult appreciated.  Will challenge with 1/2 NS for now and reevaluate in am.  F/u on am labs

## 2021-11-26 NOTE — PROGRESS NOTE ADULT - SUBJECTIVE AND OBJECTIVE BOX
lying in bed    Vital Signs Last 24 Hrs  T(C): 36.4 (2021 05:29), Max: 37.1 (2021 16:29)  T(F): 97.5 (2021 05:29), Max: 98.7 (2021 16:29)  HR: 65 (2021 05:29) (65 - 82)  BP: 129/84 (2021 05:29) (114/79 - 162/63)  BP(mean): --  RR: 18 (2021 05:29) (17 - 18)  SpO2: 99% (2021 05:29) (97% - 100%)    PHYSICAL EXAM:    general - AAO x 3  HEENT - No Icterus  CVS - RRR  RS - AE B/L  Abd - soft, NT  Ext - Pulses +        LABS:                        9.5    6.88  )-----------( 344      ( 2021 06:42 )             30.2     11-    146<H>  |  118<H>  |  37<H>  ----------------------------<  98  4.7   |  23  |  2.64<H>    Ca    8.3<L>      2021 06:42  Mg     2.2     11-24    TPro  6.3  /  Alb  2.5<L>  /  TBili  0.4  /  DBili  x   /  AST  14<L>  /  ALT  18  /  AlkPhos  54  11-26    PT/INR - ( 2021 07:24 )   PT: 14.1 sec;   INR: 1.23 ratio         PTT - ( 2021 06:42 )  PTT:73.2 sec  Urinalysis Basic - ( 2021 16:36 )    Color: Yellow / Appearance: very cloudy / S.015 / pH: x  Gluc: x / Ketone: Negative  / Bili: Negative / Urobili: Negative mg/dL   Blood: x / Protein: 100 mg/dL / Nitrite: Negative   Leuk Esterase: Small / RBC: >50 /HPF / WBC 11-25   Sq Epi: x / Non Sq Epi: Few / Bacteria: Few        Culture - Urine (collected 2021 00:55)  Source: Clean Catch Clean Catch (Midstream)  Final Report (2021 20:18):    <10,000 CFU/mL Normal Urogenital Rebecca

## 2021-11-26 NOTE — PROGRESS NOTE ADULT - PROBLEM SELECTOR PLAN 3
Controlled  Continue amlodipine 5 mg daily and titrate up as needed.
Start on amlodipine 5 mg daily and titrate up as needed.

## 2021-11-26 NOTE — PROGRESS NOTE ADULT - PROBLEM SELECTOR PLAN 1
- for IR guided biopsy  - Heparin drip because of renal dysfunction may need coumadin  - Physical therapy  - with IVC filter
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
## 2021-11-26 NOTE — PROCEDURE NOTE - PROCEDURE FINDINGS AND DETAILS
CT guided biopsy of right peritoneal mass performed via posterior approach. 4 corse obtained. no immediate complications.

## 2021-11-26 NOTE — PROGRESS NOTE ADULT - PROBLEM SELECTOR PLAN 4
elevated MCV, macrocytic.  Likely multifactorial.  Monitor H & H, f/u on folate and B12 results  Check stool guaiac.
Likely due to kidney disease.  Monitor H & H  Check stool guaiac.

## 2021-11-26 NOTE — PROGRESS NOTE ADULT - PROBLEM SELECTOR PLAN 1
continue heparin drip for now, starting tonight with bridge to coumadin, one dose of 5 mg ordered.  No DOACs since patient has renal insufficiency and has only one kidney left.  F/u on VQscan to r/o pulmonary embolism., patient looks stable. aching

## 2021-11-26 NOTE — PROGRESS NOTE ADULT - ASSESSMENT
55 yo man with h/o right kidney cancer and s/p right nephrectomy 5 years ago now presents with bilateral LE swelling and DVT as well as renal insufficiency. Today s/p IR biopsy of right renal fossa mass. Will need to follow up on biopsy results.

## 2021-11-27 LAB
ANION GAP SERPL CALC-SCNC: 8 MMOL/L — SIGNIFICANT CHANGE UP (ref 5–17)
APTT BLD: 129.4 SEC — CRITICAL HIGH (ref 27.5–35.5)
APTT BLD: 45 SEC — HIGH (ref 27.5–35.5)
BUN SERPL-MCNC: 31 MG/DL — HIGH (ref 7–23)
CALCIUM SERPL-MCNC: 8.4 MG/DL — LOW (ref 8.5–10.1)
CHLORIDE SERPL-SCNC: 113 MMOL/L — HIGH (ref 96–108)
CO2 SERPL-SCNC: 22 MMOL/L — SIGNIFICANT CHANGE UP (ref 22–31)
CREAT SERPL-MCNC: 2.45 MG/DL — HIGH (ref 0.5–1.3)
FOLATE SERPL-MCNC: 8.6 NG/ML — SIGNIFICANT CHANGE UP
GLUCOSE SERPL-MCNC: 96 MG/DL — SIGNIFICANT CHANGE UP (ref 70–99)
HCT VFR BLD CALC: 32.3 % — LOW (ref 39–50)
HGB BLD-MCNC: 9.7 G/DL — LOW (ref 13–17)
INR BLD: 1.23 RATIO — HIGH (ref 0.88–1.16)
MCHC RBC-ENTMCNC: 30 GM/DL — LOW (ref 32–36)
MCHC RBC-ENTMCNC: 30.6 PG — SIGNIFICANT CHANGE UP (ref 27–34)
MCV RBC AUTO: 101.9 FL — HIGH (ref 80–100)
NRBC # BLD: 0 /100 WBCS — SIGNIFICANT CHANGE UP (ref 0–0)
PLATELET # BLD AUTO: 333 K/UL — SIGNIFICANT CHANGE UP (ref 150–400)
POTASSIUM SERPL-MCNC: 5 MMOL/L — SIGNIFICANT CHANGE UP (ref 3.5–5.3)
POTASSIUM SERPL-SCNC: 5 MMOL/L — SIGNIFICANT CHANGE UP (ref 3.5–5.3)
PROTHROM AB SERPL-ACNC: 14.1 SEC — HIGH (ref 10.6–13.6)
RBC # BLD: 3.17 M/UL — LOW (ref 4.2–5.8)
RBC # FLD: 15.2 % — HIGH (ref 10.3–14.5)
SODIUM SERPL-SCNC: 143 MMOL/L — SIGNIFICANT CHANGE UP (ref 135–145)
VIT B12 SERPL-MCNC: 559 PG/ML — SIGNIFICANT CHANGE UP (ref 232–1245)
WBC # BLD: 6.41 K/UL — SIGNIFICANT CHANGE UP (ref 3.8–10.5)
WBC # FLD AUTO: 6.41 K/UL — SIGNIFICANT CHANGE UP (ref 3.8–10.5)

## 2021-11-27 PROCEDURE — 99232 SBSQ HOSP IP/OBS MODERATE 35: CPT

## 2021-11-27 PROCEDURE — 99223 1ST HOSP IP/OBS HIGH 75: CPT

## 2021-11-27 RX ORDER — APIXABAN 2.5 MG/1
10 TABLET, FILM COATED ORAL EVERY 12 HOURS
Refills: 0 | Status: COMPLETED | OUTPATIENT
Start: 2021-11-27 | End: 2021-11-30

## 2021-11-27 RX ORDER — SODIUM CHLORIDE 9 MG/ML
1000 INJECTION, SOLUTION INTRAVENOUS
Refills: 0 | Status: DISCONTINUED | OUTPATIENT
Start: 2021-11-27 | End: 2021-11-28

## 2021-11-27 RX ADMIN — AMLODIPINE BESYLATE 5 MILLIGRAM(S): 2.5 TABLET ORAL at 05:57

## 2021-11-27 RX ADMIN — SODIUM CHLORIDE 125 MILLILITER(S): 9 INJECTION, SOLUTION INTRAVENOUS at 15:10

## 2021-11-27 RX ADMIN — HEPARIN SODIUM 9000 UNIT(S): 5000 INJECTION INTRAVENOUS; SUBCUTANEOUS at 00:31

## 2021-11-27 RX ADMIN — Medication 81 MILLIGRAM(S): at 11:26

## 2021-11-27 RX ADMIN — HEPARIN SODIUM 1500 UNIT(S)/HR: 5000 INJECTION INTRAVENOUS; SUBCUTANEOUS at 00:31

## 2021-11-27 RX ADMIN — HEPARIN SODIUM 1300 UNIT(S)/HR: 5000 INJECTION INTRAVENOUS; SUBCUTANEOUS at 18:21

## 2021-11-27 RX ADMIN — HEPARIN SODIUM 0 UNIT(S)/HR: 5000 INJECTION INTRAVENOUS; SUBCUTANEOUS at 09:37

## 2021-11-27 RX ADMIN — HEPARIN SODIUM 1100 UNIT(S)/HR: 5000 INJECTION INTRAVENOUS; SUBCUTANEOUS at 10:39

## 2021-11-27 RX ADMIN — HEPARIN SODIUM 4500 UNIT(S): 5000 INJECTION INTRAVENOUS; SUBCUTANEOUS at 18:29
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
## 2021-11-27 NOTE — PROGRESS NOTE ADULT - SUBJECTIVE AND OBJECTIVE BOX
Subjective: no complaints.       MEDICATIONS  (STANDING):  amLODIPine   Tablet 5 milliGRAM(s) Oral daily  aspirin  chewable 81 milliGRAM(s) Oral daily  heparin  Infusion. 1500 Unit(s)/Hr (15 mL/Hr) IV Continuous <Continuous>  sodium chloride 0.45%. 1000 milliLiter(s) (125 mL/Hr) IV Continuous <Continuous>    MEDICATIONS  (PRN):  acetaminophen     Tablet .. 650 milliGRAM(s) Oral every 6 hours PRN Temp greater or equal to 38C (100.4F), Mild Pain (1 - 3)  heparin   Injectable 9000 Unit(s) IV Push every 6 hours PRN For aPTT less than 40  heparin   Injectable 4500 Unit(s) IV Push every 6 hours PRN For aPTT between 40 - 57          T(C): 37.1 (11-27-21 @ 06:03), Max: 37.1 (11-27-21 @ 06:03)  HR: 60 (11-27-21 @ 06:03) (60 - 69)  BP: 120/80 (11-27-21 @ 06:03) (116/81 - 123/89)  RR: 18 (11-27-21 @ 06:03) (18 - 18)  SpO2: 99% (11-27-21 @ 06:03) (98% - 99%)  Wt(kg): --        I&O's Detail    26 Nov 2021 07:01  -  27 Nov 2021 07:00  --------------------------------------------------------  IN:    Oral Fluid: 420 mL  Total IN: 420 mL    OUT:    Voided (mL): 900 mL  Total OUT: 900 mL    Total NET: -480 mL               PHYSICAL EXAM:    GENERAL: NAD  NECK: Supple, no inc in JVP  CHEST/LUNG: Clear  HEART: S1S2  ABDOMEN: Soft  EXTREMITIES: trace edema      LABS:  CBC Full  -  ( 27 Nov 2021 06:37 )  WBC Count : 6.41 K/uL  RBC Count : 3.17 M/uL  Hemoglobin : 9.7 g/dL  Hematocrit : 32.3 %  Platelet Count - Automated : 333 K/uL  Mean Cell Volume : 101.9 fl  Mean Cell Hemoglobin : 30.6 pg  Mean Cell Hemoglobin Concentration : 30.0 gm/dL  Auto Neutrophil # : x  Auto Lymphocyte # : x  Auto Monocyte # : x  Auto Eosinophil # : x  Auto Basophil # : x  Auto Neutrophil % : x  Auto Lymphocyte % : x  Auto Monocyte % : x  Auto Eosinophil % : x  Auto Basophil % : x    11-27    143  |  113<H>  |  31<H>  ----------------------------<  96  5.0   |  22  |  2.45<H>    Ca    8.4<L>      27 Nov 2021 06:37    TPro  6.3  /  Alb  2.5<L>  /  TBili  0.4  /  DBili  x   /  AST  14<L>  /  ALT  18  /  AlkPhos  54  11-26    PT/INR - ( 27 Nov 2021 06:37 )   PT: 14.1 sec;   INR: 1.23 ratio         PTT - ( 26 Nov 2021 23:11 )  PTT:30.4 sec    Culture Results:   <10,000 CFU/mL Normal Urogenital Rebecca (11-25 @ 00:55)        Assessment   BLANCHE CKD 3-4; solitary kidney. Pre-, post-renal factors  Suspected recurrent renal malignancy   Risk for renal vein thrombosis   Renal indices stable     Plan  Reduce saline to 75cc/h  Cont to trend Cr daily  Follow biopsy results

## 2021-11-27 NOTE — CONSULT NOTE ADULT - ATTENDING COMMENTS
I have seen and examined the patient and agree with the above assessment and plan. the pt has IVC filter, the left leg is swollen but getting better. Right leg is  less swelling.  plan-- cont the Ac, as per medicine. no vascular surgical intervention.

## 2021-11-27 NOTE — PROGRESS NOTE ADULT - SUBJECTIVE AND OBJECTIVE BOX
lying in bed    Vital Signs Last 24 Hrs  T(C): 36.8 (27 Nov 2021 11:21), Max: 37.1 (27 Nov 2021 06:03)  T(F): 98.2 (27 Nov 2021 11:21), Max: 98.7 (27 Nov 2021 06:03)  HR: 88 (27 Nov 2021 11:21) (60 - 88)  BP: 110/77 (27 Nov 2021 11:21) (110/77 - 123/89)  BP(mean): 88 (27 Nov 2021 11:21) (88 - 88)  RR: 17 (27 Nov 2021 11:21) (17 - 18)  SpO2: 98% (27 Nov 2021 11:21) (98% - 99%)    PHYSICAL EXAM:    general - AAO x 3  HEENT - No Icterus  CVS - RRR  RS - AE B/L  Abd - soft, NT  Ext - Pulses +        LABS:                        9.7    6.41  )-----------( 333      ( 27 Nov 2021 06:37 )             32.3     11-27    143  |  113<H>  |  31<H>  ----------------------------<  96  5.0   |  22  |  2.45<H>    Ca    8.4<L>      27 Nov 2021 06:37    TPro  6.3  /  Alb  2.5<L>  /  TBili  0.4  /  DBili  x   /  AST  14<L>  /  ALT  18  /  AlkPhos  54  11-26    PT/INR - ( 27 Nov 2021 06:37 )   PT: 14.1 sec;   INR: 1.23 ratio         PTT - ( 27 Nov 2021 06:37 )  PTT:129.4 sec      Culture - Urine (collected 25 Nov 2021 00:55)  Source: Clean Catch Clean Catch (Midstream)  Final Report (25 Nov 2021 20:18):    <10,000 CFU/mL Normal Urogenital Rebecca
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
## 2021-11-27 NOTE — PROGRESS NOTE ADULT - PROBLEM SELECTOR PLAN 1
- possible recurrent, new Renal cancer - s/p Biopsy - await results  - Extensive DVT on Heprain   - Suggest Vascular Evaluation - ? any local treatment for extensive DVT - called Dr Awad  - patient expressed wants to follow up with [facility], where he was in processing of making appoinmtent prior to admission - possible recurrent, new Renal cancer - s/p Biopsy - await results  - Extensive DVT on Heprain   - Suggest Vascular Evaluation - ? any local treatment for extensive DVT - called Dr Awad  - patient expressed wants to follow up with [facility], where he was in processing of making appoinmtent prior to admission  - CT head
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
## 2021-11-28 LAB
ANION GAP SERPL CALC-SCNC: 5 MMOL/L — SIGNIFICANT CHANGE UP (ref 5–17)
APTT BLD: 51.4 SEC — HIGH (ref 27.5–35.5)
BUN SERPL-MCNC: 35 MG/DL — HIGH (ref 7–23)
CALCIUM SERPL-MCNC: 8.5 MG/DL — SIGNIFICANT CHANGE UP (ref 8.5–10.1)
CHLORIDE SERPL-SCNC: 115 MMOL/L — HIGH (ref 96–108)
CO2 SERPL-SCNC: 22 MMOL/L — SIGNIFICANT CHANGE UP (ref 22–31)
CREAT SERPL-MCNC: 2.64 MG/DL — HIGH (ref 0.5–1.3)
GLUCOSE SERPL-MCNC: 93 MG/DL — SIGNIFICANT CHANGE UP (ref 70–99)
HCT VFR BLD CALC: 32.8 % — LOW (ref 39–50)
HGB BLD-MCNC: 10.1 G/DL — LOW (ref 13–17)
MAGNESIUM SERPL-MCNC: 2.1 MG/DL — SIGNIFICANT CHANGE UP (ref 1.6–2.6)
MCHC RBC-ENTMCNC: 30.8 GM/DL — LOW (ref 32–36)
MCHC RBC-ENTMCNC: 31.5 PG — SIGNIFICANT CHANGE UP (ref 27–34)
MCV RBC AUTO: 102.2 FL — HIGH (ref 80–100)
NRBC # BLD: 0 /100 WBCS — SIGNIFICANT CHANGE UP (ref 0–0)
PHOSPHATE SERPL-MCNC: 3.6 MG/DL — SIGNIFICANT CHANGE UP (ref 2.5–4.5)
PLATELET # BLD AUTO: 321 K/UL — SIGNIFICANT CHANGE UP (ref 150–400)
POTASSIUM SERPL-MCNC: 5.4 MMOL/L — HIGH (ref 3.5–5.3)
POTASSIUM SERPL-SCNC: 5.4 MMOL/L — HIGH (ref 3.5–5.3)
RBC # BLD: 3.21 M/UL — LOW (ref 4.2–5.8)
RBC # FLD: 15.1 % — HIGH (ref 10.3–14.5)
SODIUM SERPL-SCNC: 142 MMOL/L — SIGNIFICANT CHANGE UP (ref 135–145)
WBC # BLD: 6.63 K/UL — SIGNIFICANT CHANGE UP (ref 3.8–10.5)
WBC # FLD AUTO: 6.63 K/UL — SIGNIFICANT CHANGE UP (ref 3.8–10.5)

## 2021-11-28 PROCEDURE — 99232 SBSQ HOSP IP/OBS MODERATE 35: CPT

## 2021-11-28 RX ORDER — SODIUM CHLORIDE 9 MG/ML
1000 INJECTION, SOLUTION INTRAVENOUS
Refills: 0 | Status: DISCONTINUED | OUTPATIENT
Start: 2021-11-28 | End: 2021-11-29

## 2021-11-28 RX ADMIN — SODIUM CHLORIDE 75 MILLILITER(S): 9 INJECTION, SOLUTION INTRAVENOUS at 13:56

## 2021-11-28 RX ADMIN — SODIUM CHLORIDE 75 MILLILITER(S): 9 INJECTION, SOLUTION INTRAVENOUS at 03:00

## 2021-11-28 RX ADMIN — APIXABAN 10 MILLIGRAM(S): 2.5 TABLET, FILM COATED ORAL at 17:23

## 2021-11-28 RX ADMIN — Medication 81 MILLIGRAM(S): at 11:50

## 2021-11-28 RX ADMIN — AMLODIPINE BESYLATE 5 MILLIGRAM(S): 2.5 TABLET ORAL at 05:50

## 2021-11-28 RX ADMIN — APIXABAN 10 MILLIGRAM(S): 2.5 TABLET, FILM COATED ORAL at 05:50
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
## 2021-11-28 NOTE — PROGRESS NOTE ADULT - SUBJECTIVE AND OBJECTIVE BOX
lying in bed    Vital Signs Last 24 Hrs  T(C): 36.6 (28 Nov 2021 10:47), Max: 36.8 (27 Nov 2021 11:21)  T(F): 97.9 (28 Nov 2021 10:47), Max: 98.3 (28 Nov 2021 05:19)  HR: 79 (28 Nov 2021 10:47) (67 - 88)  BP: 134/57 (28 Nov 2021 10:47) (110/77 - 134/57)  BP(mean): 83 (28 Nov 2021 10:47) (83 - 93)  RR: 17 (28 Nov 2021 10:47) (17 - 18)  SpO2: 97% (28 Nov 2021 10:47) (97% - 100%)    PHYSICAL EXAM:    general - AAO x 3  HEENT - No Icterus  CVS - RRR  RS - AE B/L  Abd - soft, NT  Ext - Pulses +        LABS:                        10.1   6.63  )-----------( 321      ( 28 Nov 2021 07:54 )             32.8     11-28    142  |  115<H>  |  35<H>  ----------------------------<  93  5.4<H>   |  22  |  2.64<H>    Ca    8.5      28 Nov 2021 07:54  Phos  3.6     11-28  Mg     2.1     11-28      PT/INR - ( 27 Nov 2021 06:37 )   PT: 14.1 sec;   INR: 1.23 ratio         PTT - ( 28 Nov 2021 00:54 )  PTT:51.4 sec      Culture - Urine (collected 25 Nov 2021 00:55)  Source: Clean Catch Clean Catch (Midstream)  Final Report (25 Nov 2021 20:18):    <10,000 CFU/mL Normal Urogenital Rebecca

## 2021-11-28 NOTE — PROGRESS NOTE ADULT - SUBJECTIVE AND OBJECTIVE BOX
Subjective: no complaints.       MEDICATIONS  (STANDING):  amLODIPine   Tablet 5 milliGRAM(s) Oral daily  apixaban 10 milliGRAM(s) Oral every 12 hours  aspirin  chewable 81 milliGRAM(s) Oral daily  sodium chloride 0.45%. 1000 milliLiter(s) (75 mL/Hr) IV Continuous <Continuous>    MEDICATIONS  (PRN):  acetaminophen     Tablet .. 650 milliGRAM(s) Oral every 6 hours PRN Temp greater or equal to 38C (100.4F), Mild Pain (1 - 3)          T(C): 36.8 (11-28-21 @ 05:19), Max: 36.8 (11-27-21 @ 11:21)  HR: 69 (11-28-21 @ 05:19) (69 - 88)  BP: 121/80 (11-28-21 @ 05:19) (110/77 - 125/78)  RR: 18 (11-28-21 @ 05:19) (17 - 18)  SpO2: 100% (11-28-21 @ 05:19) (98% - 100%)  Wt(kg): --        I&O's Detail           PHYSICAL EXAM:    GENERAL: NAD  NECK: Supple, no inc in JVP  CHEST/LUNG: Clear  HEART: S1S2  ABDOMEN: Soft, Nontender, Nondistended; Bowel sounds present  EXTREMITIES:  trace edema      LABS:  CBC Full  -  ( 27 Nov 2021 06:37 )  WBC Count : 6.41 K/uL  RBC Count : 3.17 M/uL  Hemoglobin : 9.7 g/dL  Hematocrit : 32.3 %  Platelet Count - Automated : 333 K/uL  Mean Cell Volume : 101.9 fl  Mean Cell Hemoglobin : 30.6 pg  Mean Cell Hemoglobin Concentration : 30.0 gm/dL  Auto Neutrophil # : x  Auto Lymphocyte # : x  Auto Monocyte # : x  Auto Eosinophil # : x  Auto Basophil # : x  Auto Neutrophil % : x  Auto Lymphocyte % : x  Auto Monocyte % : x  Auto Eosinophil % : x  Auto Basophil % : x    11-27    143  |  113<H>  |  31<H>  ----------------------------<  96  5.0   |  22  |  2.45<H>    Ca    8.4<L>      27 Nov 2021 06:37      PT/INR - ( 27 Nov 2021 06:37 )   PT: 14.1 sec;   INR: 1.23 ratio         PTT - ( 28 Nov 2021 00:54 )  PTT:51.4 sec        Assessment   BLANCHE CKD 3-4; solitary kidney. Pre-, post-renal factors  Suspected recurrent renal malignancy   Risk for renal vein thrombosis   Renal indices stable     Plan  May stop IVFs  Cont to trend Cr daily

## 2021-11-28 NOTE — PROGRESS NOTE ADULT - PROBLEM SELECTOR PLAN 1
- Patient anticoagulation was changed to Eliquis by Dr Rojas, I d/w him yesterday that to confirm if this switch was ok with Renal in setting of patient having right Nephrectomy and left renal masses and renal insufficecny - If not Heparin and coumadin may be safer option - Anticoagulation per Dr Rojas  - s/p biopsy of metastaic disease  - CT head as ordered yesterday  - patient expressed wants to follow up with [facility], where he was in processing of making appoinmtent prior to admission - Patient anticoagulation was changed to Eliquis by Dr Rojas, I d/w him yesterday that to confirm if this switch was ok with Renal in setting of patient having right Nephrectomy and left renal masses and renal insufficecny - If not Heparin and coumadin may be safer option - Choice of Anticoagulation per Dr Rojas  - s/p biopsy of metastaic disease  - CT head as ordered yesterday  - patient expressed wants to follow up with [facility], where he was in processing of making appoinmtent prior to admission
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
## 2021-11-29 LAB
ANION GAP SERPL CALC-SCNC: 5 MMOL/L — SIGNIFICANT CHANGE UP (ref 5–17)
BUN SERPL-MCNC: 35 MG/DL — HIGH (ref 7–23)
CALCIUM SERPL-MCNC: 8.4 MG/DL — LOW (ref 8.5–10.1)
CHLORIDE SERPL-SCNC: 115 MMOL/L — HIGH (ref 96–108)
CO2 SERPL-SCNC: 20 MMOL/L — LOW (ref 22–31)
CREAT SERPL-MCNC: 2.48 MG/DL — HIGH (ref 0.5–1.3)
GLUCOSE SERPL-MCNC: 100 MG/DL — HIGH (ref 70–99)
HCT VFR BLD CALC: 31.1 % — LOW (ref 39–50)
HGB BLD-MCNC: 9.6 G/DL — LOW (ref 13–17)
MAGNESIUM SERPL-MCNC: 2 MG/DL — SIGNIFICANT CHANGE UP (ref 1.6–2.6)
MCHC RBC-ENTMCNC: 30.9 GM/DL — LOW (ref 32–36)
MCHC RBC-ENTMCNC: 31.5 PG — SIGNIFICANT CHANGE UP (ref 27–34)
MCV RBC AUTO: 102 FL — HIGH (ref 80–100)
NRBC # BLD: 0 /100 WBCS — SIGNIFICANT CHANGE UP (ref 0–0)
PLATELET # BLD AUTO: 306 K/UL — SIGNIFICANT CHANGE UP (ref 150–400)
POTASSIUM SERPL-MCNC: 5.6 MMOL/L — HIGH (ref 3.5–5.3)
POTASSIUM SERPL-SCNC: 5.6 MMOL/L — HIGH (ref 3.5–5.3)
RBC # BLD: 3.05 M/UL — LOW (ref 4.2–5.8)
RBC # FLD: 15.2 % — HIGH (ref 10.3–14.5)
SODIUM SERPL-SCNC: 140 MMOL/L — SIGNIFICANT CHANGE UP (ref 135–145)
WBC # BLD: 6.28 K/UL — SIGNIFICANT CHANGE UP (ref 3.8–10.5)
WBC # FLD AUTO: 6.28 K/UL — SIGNIFICANT CHANGE UP (ref 3.8–10.5)

## 2021-11-29 PROCEDURE — 99232 SBSQ HOSP IP/OBS MODERATE 35: CPT

## 2021-11-29 PROCEDURE — 70450 CT HEAD/BRAIN W/O DYE: CPT | Mod: 26

## 2021-11-29 RX ORDER — SODIUM POLYSTYRENE SULFONATE 4.1 MEQ/G
30 POWDER, FOR SUSPENSION ORAL ONCE
Refills: 0 | Status: COMPLETED | OUTPATIENT
Start: 2021-11-29 | End: 2021-11-29

## 2021-11-29 RX ORDER — APIXABAN 2.5 MG/1
5 TABLET, FILM COATED ORAL EVERY 12 HOURS
Refills: 0 | Status: DISCONTINUED | OUTPATIENT
Start: 2021-12-01 | End: 2021-11-30

## 2021-11-29 RX ORDER — SODIUM CHLORIDE 9 MG/ML
1000 INJECTION, SOLUTION INTRAVENOUS
Refills: 0 | Status: COMPLETED | OUTPATIENT
Start: 2021-11-29 | End: 2021-11-29

## 2021-11-29 RX ORDER — SODIUM ZIRCONIUM CYCLOSILICATE 10 G/10G
10 POWDER, FOR SUSPENSION ORAL EVERY 8 HOURS
Refills: 0 | Status: COMPLETED | OUTPATIENT
Start: 2021-11-29 | End: 2021-11-30

## 2021-11-29 RX ADMIN — SODIUM POLYSTYRENE SULFONATE 30 GRAM(S): 4.1 POWDER, FOR SUSPENSION ORAL at 09:52

## 2021-11-29 RX ADMIN — Medication 81 MILLIGRAM(S): at 11:08

## 2021-11-29 RX ADMIN — APIXABAN 10 MILLIGRAM(S): 2.5 TABLET, FILM COATED ORAL at 05:44

## 2021-11-29 RX ADMIN — SODIUM CHLORIDE 75 MILLILITER(S): 9 INJECTION, SOLUTION INTRAVENOUS at 00:53

## 2021-11-29 RX ADMIN — APIXABAN 10 MILLIGRAM(S): 2.5 TABLET, FILM COATED ORAL at 17:20

## 2021-11-29 RX ADMIN — AMLODIPINE BESYLATE 5 MILLIGRAM(S): 2.5 TABLET ORAL at 05:44

## 2021-11-29 RX ADMIN — SODIUM CHLORIDE 100 MILLILITER(S): 9 INJECTION, SOLUTION INTRAVENOUS at 13:03

## 2021-11-29 RX ADMIN — SODIUM ZIRCONIUM CYCLOSILICATE 10 GRAM(S): 10 POWDER, FOR SUSPENSION ORAL at 21:19

## 2021-11-29 RX ADMIN — SODIUM ZIRCONIUM CYCLOSILICATE 10 GRAM(S): 10 POWDER, FOR SUSPENSION ORAL at 12:49

## 2021-11-29 NOTE — PHARMACOTHERAPY INTERVENTION NOTE - COMMENTS
Recommended apixaban 10mg stop date adjustment to tomorrow after PM dose to account for days that patient received heparin. Additionally, recommended initiating apixaban 5mg twice daily set to start at 6AM december 1st in order to ensure that no doses are missed.
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
## 2021-11-29 NOTE — PROGRESS NOTE ADULT - PROBLEM SELECTOR PLAN 1
- Patient anticoagulation was changed to Eliquis by Dr Rojas, I d/w him that to confirm if this switch was ok with Renal in setting of patient having right Nephrectomy and left renal masses and renal insufficecny - If not Heparin and coumadin may be safer option - Choice of Anticoagulation per Dr Rojas  - s/p biopsy of metastaic disease  - CT head as ordered   - patient expressed wants to follow up with [facility], where he was in processing of making appoinmtent prior to admission.

## 2021-11-29 NOTE — PROGRESS NOTE ADULT - SUBJECTIVE AND OBJECTIVE BOX
lying in bed    Vital Signs Last 24 Hrs  T(C): 36.9 (29 Nov 2021 05:08), Max: 36.9 (29 Nov 2021 05:08)  T(F): 98.4 (29 Nov 2021 05:08), Max: 98.4 (29 Nov 2021 05:08)  HR: 79 (29 Nov 2021 05:08) (68 - 79)  BP: 112/72 (29 Nov 2021 05:08) (112/72 - 134/57)  BP(mean): 83 (28 Nov 2021 10:47) (83 - 83)  RR: 18 (29 Nov 2021 05:08) (17 - 18)  SpO2: 100% (29 Nov 2021 05:08) (97% - 100%)    PHYSICAL EXAM:    general - AAO x 3  HEENT - No Icterus  CVS - RRR  RS - AE B/L  Abd - soft, NT  Ext - Pulses +        LABS:                        9.6    6.28  )-----------( 306      ( 29 Nov 2021 06:59 )             31.1     11-29    140  |  115<H>  |  35<H>  ----------------------------<  100<H>  5.6<H>   |  20<L>  |  2.48<H>    Ca    8.4<L>      29 Nov 2021 06:59  Phos  3.6     11-28  Mg     2.0     11-29      PTT - ( 28 Nov 2021 00:54 )  PTT:51.4 sec      Culture - Urine (collected 25 Nov 2021 00:55)  Source: Clean Catch Clean Catch (Midstream)  Final Report (25 Nov 2021 20:18):    <10,000 CFU/mL Normal Urogenital Rebecca

## 2021-11-29 NOTE — PROGRESS NOTE ADULT - SUBJECTIVE AND OBJECTIVE BOX
[facility] NEPHROLOGY SERVICES, [facility]  NEPHROLOGY AND HYPERTENSION  [address]  [address]  [place] [address]  [phone]    MD ERIN HOOPER, MD JOSE HOPKINS, MD QUANG VALENTINO, MD ESVIN NARAYANAN, MD LISSA BUSBY MD          Patient events noted  No distress      MEDICATIONS  (STANDING):  amLODIPine   Tablet 5 milliGRAM(s) Oral daily  apixaban 10 milliGRAM(s) Oral every 12 hours  aspirin  chewable 81 milliGRAM(s) Oral daily  sodium zirconium cyclosilicate 10 Gram(s) Oral every 8 hours    MEDICATIONS  (PRN):  acetaminophen     Tablet .. 650 milliGRAM(s) Oral every 6 hours PRN Temp greater or equal to 38C (100.4F), Mild Pain (1 - 3)      11-28-21 @ 07:01  -  11-29-21 @ 07:00  --------------------------------------------------------  IN: 2140 mL / OUT: 1650 mL / NET: 490 mL    11-29-21 @ 07:01  -  11-29-21 @ 21:12  --------------------------------------------------------  IN: 1630 mL / OUT: 0 mL / NET: 1630 mL      PHYSICAL EXAM:      T(C): 37.1 (11-29-21 @ 16:20), Max: 37.1 (11-29-21 @ 16:20)  HR: 74 (11-29-21 @ 16:20) (69 - 79)  BP: 123/84 (11-29-21 @ 16:20) (112/72 - 127/84)  RR: 18 (11-29-21 @ 16:20) (18 - 18)  SpO2: 99% (11-29-21 @ 16:20) (98% - 100%)  Wt(kg): --  Lungs clear  Heart S1S2  Abd soft NT ND  Extremities:   1 edema                                    9.6    6.28  )-----------( 306      ( 29 Nov 2021 06:59 )             31.1     11-29    140  |  115<H>  |  35<H>  ----------------------------<  100<H>  5.6<H>   |  20<L>  |  2.48<H>    Ca    8.4<L>      29 Nov 2021 06:59  Phos  3.6     11-28  Mg     2.0     11-29          Creatinine Trend: 2.48<--, 2.64<--, 2.45<--, 2.64<--, 2.56<--, 2.91<--        Assessment   BLANCHE CKD 3-4; solitary kidney. Pre-, post-renal factors  Suspected recurrent renal malignancy   Risk for renal vein thrombosis   Renal indices stable     Plan  [facility]   Cont to trend Cr daily          Elliott Kuhn MD

## 2021-11-29 NOTE — PROGRESS NOTE ADULT - PROBLEM SELECTOR PROBLEM 1
Renal carcinoma
DVT of lower limb, acute
Renal carcinoma
DVT of lower limb, acute

## 2021-11-30 ENCOUNTER — TRANSCRIPTION ENCOUNTER (OUTPATIENT)
Age: 56
End: 2021-11-30

## 2021-11-30 VITALS
DIASTOLIC BLOOD PRESSURE: 86 MMHG | TEMPERATURE: 98 F | SYSTOLIC BLOOD PRESSURE: 142 MMHG | OXYGEN SATURATION: 98 % | RESPIRATION RATE: 18 BRPM

## 2021-11-30 LAB
ANION GAP SERPL CALC-SCNC: 5 MMOL/L — SIGNIFICANT CHANGE UP (ref 5–17)
BUN SERPL-MCNC: 32 MG/DL — HIGH (ref 7–23)
CALCIUM SERPL-MCNC: 8.5 MG/DL — SIGNIFICANT CHANGE UP (ref 8.5–10.1)
CHLORIDE SERPL-SCNC: 112 MMOL/L — HIGH (ref 96–108)
CO2 SERPL-SCNC: 23 MMOL/L — SIGNIFICANT CHANGE UP (ref 22–31)
CREAT SERPL-MCNC: 2.26 MG/DL — HIGH (ref 0.5–1.3)
GLUCOSE SERPL-MCNC: 92 MG/DL — SIGNIFICANT CHANGE UP (ref 70–99)
HCT VFR BLD CALC: 31.7 % — LOW (ref 39–50)
HGB BLD-MCNC: 9.7 G/DL — LOW (ref 13–17)
MAGNESIUM SERPL-MCNC: 2.1 MG/DL — SIGNIFICANT CHANGE UP (ref 1.6–2.6)
MCHC RBC-ENTMCNC: 30.6 GM/DL — LOW (ref 32–36)
MCHC RBC-ENTMCNC: 31.1 PG — SIGNIFICANT CHANGE UP (ref 27–34)
MCV RBC AUTO: 101.6 FL — HIGH (ref 80–100)
NRBC # BLD: 0 /100 WBCS — SIGNIFICANT CHANGE UP (ref 0–0)
PLATELET # BLD AUTO: 316 K/UL — SIGNIFICANT CHANGE UP (ref 150–400)
POTASSIUM SERPL-MCNC: 5.2 MMOL/L — SIGNIFICANT CHANGE UP (ref 3.5–5.3)
POTASSIUM SERPL-SCNC: 5.2 MMOL/L — SIGNIFICANT CHANGE UP (ref 3.5–5.3)
RBC # BLD: 3.12 M/UL — LOW (ref 4.2–5.8)
RBC # FLD: 15.5 % — HIGH (ref 10.3–14.5)
SODIUM SERPL-SCNC: 140 MMOL/L — SIGNIFICANT CHANGE UP (ref 135–145)
WBC # BLD: 6.54 K/UL — SIGNIFICANT CHANGE UP (ref 3.8–10.5)
WBC # FLD AUTO: 6.54 K/UL — SIGNIFICANT CHANGE UP (ref 3.8–10.5)

## 2021-11-30 PROCEDURE — 99239 HOSP IP/OBS DSCHRG MGMT >30: CPT

## 2021-11-30 RX ORDER — APIXABAN 2.5 MG/1
1 TABLET, FILM COATED ORAL
Qty: 30 | Refills: 0
Start: 2021-11-30

## 2021-11-30 RX ORDER — SODIUM ZIRCONIUM CYCLOSILICATE 10 G/10G
10 POWDER, FOR SUSPENSION ORAL ONCE
Refills: 0 | Status: COMPLETED | OUTPATIENT
Start: 2021-11-30 | End: 2021-11-30

## 2021-11-30 RX ORDER — SODIUM ZIRCONIUM CYCLOSILICATE 10 G/10G
10 POWDER, FOR SUSPENSION ORAL
Qty: 30 | Refills: 0
Start: 2021-11-30 | End: 2021-12-02

## 2021-11-30 RX ORDER — APIXABAN 2.5 MG/1
1 TABLET, FILM COATED ORAL
Qty: 60 | Refills: 0
Start: 2021-11-30

## 2021-11-30 RX ORDER — AMLODIPINE BESYLATE 2.5 MG/1
1 TABLET ORAL
Qty: 30 | Refills: 0
Start: 2021-11-30 | End: 2021-12-29

## 2021-11-30 RX ADMIN — SODIUM ZIRCONIUM CYCLOSILICATE 10 GRAM(S): 10 POWDER, FOR SUSPENSION ORAL at 05:22

## 2021-11-30 RX ADMIN — APIXABAN 10 MILLIGRAM(S): 2.5 TABLET, FILM COATED ORAL at 05:22

## 2021-11-30 RX ADMIN — APIXABAN 10 MILLIGRAM(S): 2.5 TABLET, FILM COATED ORAL at 17:14

## 2021-11-30 RX ADMIN — AMLODIPINE BESYLATE 5 MILLIGRAM(S): 2.5 TABLET ORAL at 05:22

## 2021-11-30 RX ADMIN — Medication 81 MILLIGRAM(S): at 11:13

## 2021-11-30 RX ADMIN — SODIUM ZIRCONIUM CYCLOSILICATE 10 GRAM(S): 10 POWDER, FOR SUSPENSION ORAL at 13:06

## 2021-11-30 NOTE — DISCHARGE NOTE PROVIDER - PROVIDER TOKENS
PROVIDER:[TOKEN:[[id number]],FOLLOWUP:[1-3 days]],FREE:[LAST:[Your PMD],PHONE:[(   )    -],FAX:[(   )    -]],FREE:[LAST:[Your kidney doctor],PHONE:[(   )    -],FAX:[(   )    -],FOLLOWUP:[1-3 days]],FREE:[LAST:[Your cancer doctor at [facility]],PHONE:[(   )    -],FAX:[(   )    -],FOLLOWUP:[1 week]]

## 2021-11-30 NOTE — DISCHARGE NOTE PROVIDER - NSDCMRMEDTOKEN_GEN_ALL_CORE_FT
apixaban 5 mg oral tablet: Take 2 tabs orally every 12 hours until 12/4/21, then take 1 tab orally every 12 hours    aspirin 81 mg oral tablet: 1 tab(s) orally once a day  aspirin 81 mg oral tablet, chewable: 1 tab(s) orally once a day  metoprolol tartrate 25 mg oral tablet: 1 tab(s) orally 2 times a day  metoprolol tartrate 25 mg oral tablet: 1 tab(s) orally 2 times a day  sodium zirconium cyclosilicate 10 g oral powder for reconstitution: 10 gram(s) orally once a day    amLODIPine 5 mg oral tablet: 1 tab(s) orally once a day  apixaban 5 mg oral tablet: Take 2 tabs orally every 12 hours until 12/4/21, then take 1 tab orally every 12 hours    apixaban 5 mg oral tablet: Take 2 tabs orally every 12 hours until 12/4/21, then take 1 tab orally every 12 hours    aspirin 81 mg oral tablet: 1 tab(s) orally once a day  sodium zirconium cyclosilicate 10 g oral powder for reconstitution: 10 gram(s) orally once a day
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
## 2021-11-30 NOTE — PROGRESS NOTE ADULT - REASON FOR ADMISSION
Leg edema

## 2021-11-30 NOTE — DISCHARGE NOTE NURSING/CASE MANAGEMENT/SOCIAL WORK - PATIENT PORTAL LINK FT
You can access the FollowMyHealth Patient Portal offered by [facility] by registering at the following website: [email or web address]. By joining [facility]’s FollowMyHealth portal, you will also be able to view your health information using other applications (apps) compatible with our system.

## 2021-11-30 NOTE — PROGRESS NOTE ADULT - SUBJECTIVE AND OBJECTIVE BOX
[facility] NEPHROLOGY SERVICES, [facility]  NEPHROLOGY AND HYPERTENSION  [address]  [address]  [place] [address]  [phone]    MD ERIN HOOPER, MD BECCA MCNEIL, MD JOSE MCCLELLAN, MD QUANG VALENTINO, MD ESVIN NARAYANAN, MD LISSA BUSBY MD          Patient events noted  No distress    MEDICATIONS  (STANDING):  amLODIPine   Tablet 5 milliGRAM(s) Oral daily  aspirin  chewable 81 milliGRAM(s) Oral daily    MEDICATIONS  (PRN):  acetaminophen     Tablet .. 650 milliGRAM(s) Oral every 6 hours PRN Temp greater or equal to 38C (100.4F), Mild Pain (1 - 3)      11-29-21 @ 07:01  -  11-30-21 @ 07:00  --------------------------------------------------------  IN: 1630 mL / OUT: 900 mL / NET: 730 mL    11-30-21 @ 07:01  -  11-30-21 @ 19:33  --------------------------------------------------------  IN: 973 mL / OUT: 0 mL / NET: 973 mL      PHYSICAL EXAM:      T(C): 36.7 (11-30-21 @ 16:23), Max: 36.7 (11-30-21 @ 16:23)  HR: 80 (11-30-21 @ 10:32) (72 - 80)  BP: 142/86 (11-30-21 @ 16:23) (114/75 - 142/86)  RR: 18 (11-30-21 @ 16:23) (16 - 18)  SpO2: 98% (11-30-21 @ 16:23) (98% - 100%)  Wt(kg): --  Lungs clear  Heart S1S2  Abd soft NT ND  Extremities:   tr edema                                    9.7    6.54  )-----------( 316      ( 30 Nov 2021 08:35 )             31.7     11-30    140  |  112<H>  |  32<H>  ----------------------------<  92  5.2   |  23  |  2.26<H>    Ca    8.5      30 Nov 2021 08:35  Mg     2.1     11-30          Creatinine Trend: 2.26<--, 2.48<--, 2.64<--, 2.45<--, 2.64<--, 2.56<--          Assessment   BLANCHE CKD 3-4; solitary kidney. Pre-, post-renal factors  Suspected recurrent renal malignancy   Renal indices stable     Plan  [facility] for home 2-3 days   Discharge home today       Elliott Kuhn MD

## 2021-11-30 NOTE — DOWNTIME INTERRUPTION NOTE - WHICH MANUAL FORMS INITIATED?
documentation hold for POC, A&I flowsheets and Pediatric Patient Profile. see paper record for additional documentation recorded during downtime

## 2021-11-30 NOTE — PROGRESS NOTE ADULT - PROVIDER SPECIALTY LIST ADULT
Heme/Onc
Heme/Onc
Hospitalist
Hospitalist
Nephrology
Hospitalist
Nephrology
Nephrology
Heme/Onc
Heme/Onc
Hospitalist
Hospitalist

## 2021-11-30 NOTE — DISCHARGE NOTE PROVIDER - NSDCCPCAREPLAN_GEN_ALL_CORE_FT
PRINCIPAL DISCHARGE DIAGNOSIS  Diagnosis: Deep vein thrombosis (DVT)  Assessment and Plan of Treatment:       SECONDARY DISCHARGE DIAGNOSES  Diagnosis: Dyspnea  Assessment and Plan of Treatment:

## 2021-11-30 NOTE — DISCHARGE NOTE PROVIDER - CARE PROVIDER_API CALL
Elliott Kuhn  INTERNAL MEDICINE  [address] [facility], [address] [place] [id number]  Phone: [phone]  Fax: [phone]  Follow Up Time: 1-3 days    Your PMD,   Phone: (   )    -  Fax: (   )    -  Follow Up Time:     Your kidney doctor,   Phone: (   )    -  Fax: (   )    -  Follow Up Time: 1-3 days    Your cancer doctor at [facility],   Phone: (   )    -  Fax: (   )    -  Follow Up Time: 1 week

## 2021-11-30 NOTE — DISCHARGE NOTE NURSING/CASE MANAGEMENT/SOCIAL WORK - NSDCPEFALRISK_GEN_ALL_CORE
For information on Fall & Injury Prevention, visit: [email or web address] OR  [email or web address] OR  https://www.cdc.gov/steadi/patient.html

## 2021-12-04 ENCOUNTER — OUTPATIENT (OUTPATIENT)
Dept: OUTPATIENT SERVICES | Facility: HOSPITAL | Age: 56
LOS: 1 days | Discharge: HOME | End: 2021-12-04
Payer: MEDICAID

## 2021-12-04 DIAGNOSIS — Z98.89 OTHER SPECIFIED POSTPROCEDURAL STATES: Chronic | ICD-10-CM

## 2021-12-04 DIAGNOSIS — C64.9 MALIGNANT NEOPLASM OF UNSPECIFIED KIDNEY, EXCEPT RENAL PELVIS: ICD-10-CM

## 2021-12-05 DIAGNOSIS — Z79.82 LONG TERM (CURRENT) USE OF ASPIRIN: ICD-10-CM

## 2021-12-05 DIAGNOSIS — Z87.442 PERSONAL HISTORY OF URINARY CALCULI: ICD-10-CM

## 2021-12-05 DIAGNOSIS — R59.0 LOCALIZED ENLARGED LYMPH NODES: ICD-10-CM

## 2021-12-05 DIAGNOSIS — D63.8 ANEMIA IN OTHER CHRONIC DISEASES CLASSIFIED ELSEWHERE: ICD-10-CM

## 2021-12-05 DIAGNOSIS — N18.30 CHRONIC KIDNEY DISEASE, STAGE 3 UNSPECIFIED: ICD-10-CM

## 2021-12-05 DIAGNOSIS — R60.9 EDEMA, UNSPECIFIED: ICD-10-CM

## 2021-12-05 DIAGNOSIS — Z80.59 FAMILY HISTORY OF MALIGNANT NEOPLASM OF OTHER URINARY TRACT ORGAN: ICD-10-CM

## 2021-12-05 DIAGNOSIS — Z83.3 FAMILY HISTORY OF DIABETES MELLITUS: ICD-10-CM

## 2021-12-05 DIAGNOSIS — Z90.5 ACQUIRED ABSENCE OF KIDNEY: ICD-10-CM

## 2021-12-05 DIAGNOSIS — C64.9 MALIGNANT NEOPLASM OF UNSPECIFIED KIDNEY, EXCEPT RENAL PELVIS: ICD-10-CM

## 2021-12-05 DIAGNOSIS — E87.5 HYPERKALEMIA: ICD-10-CM

## 2021-12-05 DIAGNOSIS — C78.6 SECONDARY MALIGNANT NEOPLASM OF RETROPERITONEUM AND PERITONEUM: ICD-10-CM

## 2021-12-05 DIAGNOSIS — I82.403 ACUTE EMBOLISM AND THROMBOSIS OF UNSPECIFIED DEEP VEINS OF LOWER EXTREMITY, BILATERAL: ICD-10-CM

## 2021-12-05 DIAGNOSIS — I12.9 HYPERTENSIVE CHRONIC KIDNEY DISEASE WITH STAGE 1 THROUGH STAGE 4 CHRONIC KIDNEY DISEASE, OR UNSPECIFIED CHRONIC KIDNEY DISEASE: ICD-10-CM

## 2021-12-05 DIAGNOSIS — Z80.51 FAMILY HISTORY OF MALIGNANT NEOPLASM OF KIDNEY: ICD-10-CM

## 2021-12-05 DIAGNOSIS — C78.00 SECONDARY MALIGNANT NEOPLASM OF UNSPECIFIED LUNG: ICD-10-CM

## 2021-12-05 DIAGNOSIS — Z80.0 FAMILY HISTORY OF MALIGNANT NEOPLASM OF DIGESTIVE ORGANS: ICD-10-CM

## 2021-12-05 DIAGNOSIS — N17.9 ACUTE KIDNEY FAILURE, UNSPECIFIED: ICD-10-CM

## 2021-12-05 NOTE — CHART NOTE - NSCHARTNOTEFT_GEN_A_CORE
- d/w patient - pathology report shows presence of RCC, patient has appointmnet to f/u with Oncology this week, patient aware of Pathology report and patient currently feeling well, and no complaints

## 2021-12-06 PROBLEM — Z86.79 HISTORY OF INTRACRANIAL ANEURYSM: Status: RESOLVED | Noted: 2021-12-06 | Resolved: 2021-12-06

## 2021-12-06 PROBLEM — Z87.828 HISTORY OF MOTOR VEHICLE ACCIDENT: Status: RESOLVED | Noted: 2021-12-06 | Resolved: 2021-12-06

## 2021-12-07 ENCOUNTER — RESULT REVIEW (OUTPATIENT)
Age: 56
End: 2021-12-07

## 2021-12-07 ENCOUNTER — APPOINTMENT (OUTPATIENT)
Dept: HEMATOLOGY ONCOLOGY | Facility: CLINIC | Age: 56
End: 2021-12-07
Payer: MEDICAID

## 2021-12-07 ENCOUNTER — NON-APPOINTMENT (OUTPATIENT)
Age: 56
End: 2021-12-07

## 2021-12-07 ENCOUNTER — LABORATORY RESULT (OUTPATIENT)
Age: 56
End: 2021-12-07

## 2021-12-07 VITALS
BODY MASS INDEX: 33.36 KG/M2 | HEART RATE: 72 BPM | SYSTOLIC BLOOD PRESSURE: 149 MMHG | OXYGEN SATURATION: 99 % | RESPIRATION RATE: 16 BRPM | WEIGHT: 254.41 LBS | DIASTOLIC BLOOD PRESSURE: 96 MMHG | HEIGHT: 73.15 IN | TEMPERATURE: 97.2 F

## 2021-12-07 DIAGNOSIS — Z86.79 PERSONAL HISTORY OF OTHER DISEASES OF THE CIRCULATORY SYSTEM: ICD-10-CM

## 2021-12-07 DIAGNOSIS — Z83.3 FAMILY HISTORY OF DIABETES MELLITUS: ICD-10-CM

## 2021-12-07 DIAGNOSIS — Z87.828 PERSONAL HISTORY OF OTHER (HEALED) PHYSICAL INJURY AND TRAUMA: ICD-10-CM

## 2021-12-07 DIAGNOSIS — K68.19 OTHER RETROPERITONEAL ABSCESS: ICD-10-CM

## 2021-12-07 DIAGNOSIS — Z84.1 FAMILY HISTORY OF DISORDERS OF KIDNEY AND URETER: ICD-10-CM

## 2021-12-07 DIAGNOSIS — Z78.9 OTHER SPECIFIED HEALTH STATUS: ICD-10-CM

## 2021-12-07 LAB
BASOPHILS # BLD AUTO: 0.07 K/UL — SIGNIFICANT CHANGE UP (ref 0–0.2)
BASOPHILS NFR BLD AUTO: 0.9 % — SIGNIFICANT CHANGE UP (ref 0–2)
EOSINOPHIL # BLD AUTO: 0.21 K/UL — SIGNIFICANT CHANGE UP (ref 0–0.5)
EOSINOPHIL NFR BLD AUTO: 2.7 % — SIGNIFICANT CHANGE UP (ref 0–6)
HCT VFR BLD CALC: 35.3 % — LOW (ref 39–50)
HGB BLD-MCNC: 10.8 G/DL — LOW (ref 13–17)
IMM GRANULOCYTES NFR BLD AUTO: 0.4 % — SIGNIFICANT CHANGE UP (ref 0–1.5)
LYMPHOCYTES # BLD AUTO: 1.75 K/UL — SIGNIFICANT CHANGE UP (ref 1–3.3)
LYMPHOCYTES # BLD AUTO: 22.8 % — SIGNIFICANT CHANGE UP (ref 13–44)
MCHC RBC-ENTMCNC: 30.6 G/DL — LOW (ref 32–36)
MCHC RBC-ENTMCNC: 32.1 PG — SIGNIFICANT CHANGE UP (ref 27–34)
MCV RBC AUTO: 105.1 FL — HIGH (ref 80–100)
MONOCYTES # BLD AUTO: 0.66 K/UL — SIGNIFICANT CHANGE UP (ref 0–0.9)
MONOCYTES NFR BLD AUTO: 8.6 % — SIGNIFICANT CHANGE UP (ref 2–14)
NEUTROPHILS # BLD AUTO: 4.95 K/UL — SIGNIFICANT CHANGE UP (ref 1.8–7.4)
NEUTROPHILS NFR BLD AUTO: 64.6 % — SIGNIFICANT CHANGE UP (ref 43–77)
NRBC # BLD: 0 /100 WBCS — SIGNIFICANT CHANGE UP (ref 0–0)
PLATELET # BLD AUTO: 314 K/UL — SIGNIFICANT CHANGE UP (ref 150–400)
RBC # BLD: 3.36 M/UL — LOW (ref 4.2–5.8)
RBC # FLD: 15.8 % — HIGH (ref 10.3–14.5)
RETICS #: 109.2 K/UL — SIGNIFICANT CHANGE UP (ref 25–125)
RETICS/RBC NFR: 3.3 % — HIGH (ref 0.5–2.5)
WBC # BLD: 7.67 K/UL — SIGNIFICANT CHANGE UP (ref 3.8–10.5)
WBC # FLD AUTO: 7.67 K/UL — SIGNIFICANT CHANGE UP (ref 3.8–10.5)

## 2021-12-07 PROCEDURE — 99204 OFFICE O/P NEW MOD 45 MIN: CPT

## 2021-12-07 PROCEDURE — 99205 OFFICE O/P NEW HI 60 MIN: CPT

## 2021-12-08 LAB
ALBUMIN SERPL ELPH-MCNC: 4.2 G/DL
ALP BLD-CCNC: 66 U/L
ALT SERPL-CCNC: 20 U/L
ANION GAP SERPL CALC-SCNC: 9 MMOL/L
AST SERPL-CCNC: 15 U/L
BILIRUB SERPL-MCNC: 0.4 MG/DL
BUN SERPL-MCNC: 33 MG/DL
CALCIUM SERPL-MCNC: 9.3 MG/DL
CHLORIDE SERPL-SCNC: 112 MMOL/L
CO2 SERPL-SCNC: 22 MMOL/L
CREAT SERPL-MCNC: 2.97 MG/DL
GLUCOSE SERPL-MCNC: 94 MG/DL
HBV CORE IGG+IGM SER QL: NONREACTIVE
HBV SURFACE AB SER QL: NONREACTIVE
HBV SURFACE AG SER QL: NONREACTIVE
HCV AB SER QL: NONREACTIVE
HCV S/CO RATIO: 0.11 S/CO
LDH SERPL-CCNC: 271 U/L
POTASSIUM SERPL-SCNC: 5.5 MMOL/L
PROT SERPL-MCNC: 6.9 G/DL
SODIUM SERPL-SCNC: 142 MMOL/L

## 2021-12-14 ENCOUNTER — RESULT REVIEW (OUTPATIENT)
Age: 56
End: 2021-12-14

## 2021-12-14 LAB — SURGICAL PATHOLOGY STUDY: SIGNIFICANT CHANGE UP

## 2021-12-22 ENCOUNTER — RESULT REVIEW (OUTPATIENT)
Age: 56
End: 2021-12-22

## 2021-12-22 PROCEDURE — 88321 CONSLTJ&REPRT SLD PREP ELSWR: CPT

## 2021-12-23 ENCOUNTER — APPOINTMENT (OUTPATIENT)
Dept: HEMATOLOGY ONCOLOGY | Facility: CLINIC | Age: 56
End: 2021-12-23

## 2021-12-23 LAB — SURGICAL PATHOLOGY STUDY: SIGNIFICANT CHANGE UP

## 2022-01-12 ENCOUNTER — NON-APPOINTMENT (OUTPATIENT)
Age: 57
End: 2022-01-12

## 2022-01-18 PROBLEM — Z00.00 ENCOUNTER FOR PREVENTIVE HEALTH EXAMINATION: Noted: 2022-01-18

## 2022-01-29 ENCOUNTER — OUTPATIENT (OUTPATIENT)
Dept: OUTPATIENT SERVICES | Facility: HOSPITAL | Age: 57
LOS: 1 days | Discharge: HOME | End: 2022-01-29

## 2022-01-29 DIAGNOSIS — C64.9 MALIGNANT NEOPLASM OF UNSPECIFIED KIDNEY, EXCEPT RENAL PELVIS: ICD-10-CM

## 2022-01-29 DIAGNOSIS — Z98.89 OTHER SPECIFIED POSTPROCEDURAL STATES: Chronic | ICD-10-CM

## 2022-02-01 ENCOUNTER — APPOINTMENT (OUTPATIENT)
Dept: HEMATOLOGY ONCOLOGY | Facility: CLINIC | Age: 57
End: 2022-02-01

## 2022-02-01 ENCOUNTER — APPOINTMENT (OUTPATIENT)
Dept: HEMATOLOGY ONCOLOGY | Facility: CLINIC | Age: 57
End: 2022-02-01
Payer: MEDICAID

## 2022-02-10 ENCOUNTER — APPOINTMENT (OUTPATIENT)
Dept: HEMATOLOGY ONCOLOGY | Facility: CLINIC | Age: 57
End: 2022-02-10
Payer: MEDICAID

## 2022-02-10 ENCOUNTER — RESULT REVIEW (OUTPATIENT)
Age: 57
End: 2022-02-10

## 2022-02-10 ENCOUNTER — INPATIENT (INPATIENT)
Facility: HOSPITAL | Age: 57
LOS: 1 days | Discharge: HOME | End: 2022-02-12
Attending: HOSPITALIST | Admitting: HOSPITALIST
Payer: MEDICAID

## 2022-02-10 VITALS
HEIGHT: 73.62 IN | DIASTOLIC BLOOD PRESSURE: 81 MMHG | SYSTOLIC BLOOD PRESSURE: 124 MMHG | WEIGHT: 258.82 LBS | BODY MASS INDEX: 33.57 KG/M2 | OXYGEN SATURATION: 99 % | RESPIRATION RATE: 16 BRPM | HEART RATE: 65 BPM | TEMPERATURE: 98.1 F

## 2022-02-10 VITALS
RESPIRATION RATE: 16 BRPM | WEIGHT: 259.93 LBS | DIASTOLIC BLOOD PRESSURE: 82 MMHG | HEIGHT: 74 IN | SYSTOLIC BLOOD PRESSURE: 125 MMHG | OXYGEN SATURATION: 98 % | HEART RATE: 63 BPM | TEMPERATURE: 99 F

## 2022-02-10 DIAGNOSIS — Z90.5 ACQUIRED ABSENCE OF KIDNEY: Chronic | ICD-10-CM

## 2022-02-10 DIAGNOSIS — Z98.89 OTHER SPECIFIED POSTPROCEDURAL STATES: Chronic | ICD-10-CM

## 2022-02-10 LAB
ALBUMIN SERPL ELPH-MCNC: 3.2 G/DL — LOW (ref 3.3–5)
ALBUMIN SERPL ELPH-MCNC: 4.3 G/DL
ALP BLD-CCNC: 78 U/L
ALP SERPL-CCNC: 69 U/L — SIGNIFICANT CHANGE UP (ref 40–120)
ALT FLD-CCNC: 18 U/L — SIGNIFICANT CHANGE UP (ref 12–78)
ALT SERPL-CCNC: 11 U/L
ANION GAP SERPL CALC-SCNC: 10 MMOL/L
ANION GAP SERPL CALC-SCNC: 6 MMOL/L — SIGNIFICANT CHANGE UP (ref 5–17)
APPEARANCE UR: CLEAR — SIGNIFICANT CHANGE UP
APPEARANCE: CLEAR
AST SERPL-CCNC: 13 U/L
AST SERPL-CCNC: 17 U/L — SIGNIFICANT CHANGE UP (ref 15–37)
BACTERIA: NEGATIVE
BASOPHILS # BLD AUTO: 0.03 K/UL — SIGNIFICANT CHANGE UP (ref 0–0.2)
BASOPHILS # BLD AUTO: 0.03 K/UL — SIGNIFICANT CHANGE UP (ref 0–0.2)
BASOPHILS NFR BLD AUTO: 0.5 % — SIGNIFICANT CHANGE UP (ref 0–2)
BASOPHILS NFR BLD AUTO: 0.5 % — SIGNIFICANT CHANGE UP (ref 0–2)
BILIRUB SERPL-MCNC: 0.2 MG/DL
BILIRUB SERPL-MCNC: 0.3 MG/DL — SIGNIFICANT CHANGE UP (ref 0.2–1.2)
BILIRUB UR-MCNC: NEGATIVE — SIGNIFICANT CHANGE UP
BILIRUBIN URINE: NEGATIVE
BLOOD URINE: ABNORMAL
BUN SERPL-MCNC: 53 MG/DL
BUN SERPL-MCNC: 55 MG/DL — HIGH (ref 7–23)
CALCIUM SERPL-MCNC: 8.2 MG/DL — LOW (ref 8.5–10.1)
CALCIUM SERPL-MCNC: 8.5 MG/DL
CHLORIDE SERPL-SCNC: 114 MMOL/L
CHLORIDE SERPL-SCNC: 116 MMOL/L — HIGH (ref 96–108)
CO2 SERPL-SCNC: 18 MMOL/L
CO2 SERPL-SCNC: 21 MMOL/L — LOW (ref 22–31)
COLOR SPEC: YELLOW — SIGNIFICANT CHANGE UP
COLOR: NORMAL
CREAT SERPL-MCNC: 3.46 MG/DL
CREAT SERPL-MCNC: 3.65 MG/DL — HIGH (ref 0.5–1.3)
DIFF PNL FLD: ABNORMAL
EOSINOPHIL # BLD AUTO: 0.1 K/UL — SIGNIFICANT CHANGE UP (ref 0–0.5)
EOSINOPHIL # BLD AUTO: 0.16 K/UL — SIGNIFICANT CHANGE UP (ref 0–0.5)
EOSINOPHIL NFR BLD AUTO: 1.7 % — SIGNIFICANT CHANGE UP (ref 0–6)
EOSINOPHIL NFR BLD AUTO: 2.9 % — SIGNIFICANT CHANGE UP (ref 0–6)
GLUCOSE QUALITATIVE U: NEGATIVE
GLUCOSE SERPL-MCNC: 78 MG/DL
GLUCOSE SERPL-MCNC: 81 MG/DL — SIGNIFICANT CHANGE UP (ref 70–99)
GLUCOSE UR QL: NEGATIVE MG/DL — SIGNIFICANT CHANGE UP
HCT VFR BLD CALC: 38 % — LOW (ref 39–50)
HCT VFR BLD CALC: 40 % — SIGNIFICANT CHANGE UP (ref 39–50)
HGB BLD-MCNC: 11.6 G/DL — LOW (ref 13–17)
HGB BLD-MCNC: 12.2 G/DL — LOW (ref 13–17)
HYALINE CASTS: 6 /LPF
IMM GRANULOCYTES NFR BLD AUTO: 0.3 % — SIGNIFICANT CHANGE UP (ref 0–1.5)
IMM GRANULOCYTES NFR BLD AUTO: 0.4 % — SIGNIFICANT CHANGE UP (ref 0–1.5)
KETONES UR-MCNC: NEGATIVE — SIGNIFICANT CHANGE UP
KETONES URINE: NEGATIVE
LDH SERPL-CCNC: 170 U/L
LEUKOCYTE ESTERASE UR-ACNC: ABNORMAL
LEUKOCYTE ESTERASE URINE: NEGATIVE
LYMPHOCYTES # BLD AUTO: 1.13 K/UL — SIGNIFICANT CHANGE UP (ref 1–3.3)
LYMPHOCYTES # BLD AUTO: 1.49 K/UL — SIGNIFICANT CHANGE UP (ref 1–3.3)
LYMPHOCYTES # BLD AUTO: 19.2 % — SIGNIFICANT CHANGE UP (ref 13–44)
LYMPHOCYTES # BLD AUTO: 26.8 % — SIGNIFICANT CHANGE UP (ref 13–44)
MCHC RBC-ENTMCNC: 30.5 G/DL — LOW (ref 32–36)
MCHC RBC-ENTMCNC: 30.5 G/DL — LOW (ref 32–36)
MCHC RBC-ENTMCNC: 31.2 PG — SIGNIFICANT CHANGE UP (ref 27–34)
MCHC RBC-ENTMCNC: 31.9 PG — SIGNIFICANT CHANGE UP (ref 27–34)
MCV RBC AUTO: 102.2 FL — HIGH (ref 80–100)
MCV RBC AUTO: 104.4 FL — HIGH (ref 80–100)
MICROSCOPIC-UA: NORMAL
MONOCYTES # BLD AUTO: 0.49 K/UL — SIGNIFICANT CHANGE UP (ref 0–0.9)
MONOCYTES # BLD AUTO: 0.6 K/UL — SIGNIFICANT CHANGE UP (ref 0–0.9)
MONOCYTES NFR BLD AUTO: 10.8 % — SIGNIFICANT CHANGE UP (ref 2–14)
MONOCYTES NFR BLD AUTO: 8.3 % — SIGNIFICANT CHANGE UP (ref 2–14)
NEUTROPHILS # BLD AUTO: 3.26 K/UL — SIGNIFICANT CHANGE UP (ref 1.8–7.4)
NEUTROPHILS # BLD AUTO: 4.12 K/UL — SIGNIFICANT CHANGE UP (ref 1.8–7.4)
NEUTROPHILS NFR BLD AUTO: 58.6 % — SIGNIFICANT CHANGE UP (ref 43–77)
NEUTROPHILS NFR BLD AUTO: 70 % — SIGNIFICANT CHANGE UP (ref 43–77)
NITRITE UR-MCNC: NEGATIVE — SIGNIFICANT CHANGE UP
NITRITE URINE: NEGATIVE
NRBC # BLD: 0 /100 WBCS — SIGNIFICANT CHANGE UP (ref 0–0)
NRBC # BLD: 0 /100 WBCS — SIGNIFICANT CHANGE UP (ref 0–0)
PH UR: 6 — SIGNIFICANT CHANGE UP (ref 5–8)
PH URINE: 6
PLATELET # BLD AUTO: 232 K/UL — SIGNIFICANT CHANGE UP (ref 150–400)
PLATELET # BLD AUTO: 234 K/UL — SIGNIFICANT CHANGE UP (ref 150–400)
POTASSIUM SERPL-MCNC: 5.5 MMOL/L — HIGH (ref 3.5–5.3)
POTASSIUM SERPL-SCNC: 5.5 MMOL/L — HIGH (ref 3.5–5.3)
POTASSIUM SERPL-SCNC: 6.6 MMOL/L
PROT SERPL-MCNC: 7.4 G/DL
PROT SERPL-MCNC: 7.5 GM/DL — SIGNIFICANT CHANGE UP (ref 6–8.3)
PROT UR-MCNC: 30 MG/DL
PROTEIN URINE: ABNORMAL
RBC # BLD: 3.72 M/UL — LOW (ref 4.2–5.8)
RBC # BLD: 3.83 M/UL — LOW (ref 4.2–5.8)
RBC # FLD: 15.6 % — HIGH (ref 10.3–14.5)
RBC # FLD: 15.7 % — HIGH (ref 10.3–14.5)
RED BLOOD CELLS URINE: 50 /HPF
SODIUM SERPL-SCNC: 142 MMOL/L
SODIUM SERPL-SCNC: 143 MMOL/L — SIGNIFICANT CHANGE UP (ref 135–145)
SP GR SPEC: 1.01 — SIGNIFICANT CHANGE UP (ref 1.01–1.02)
SPECIFIC GRAVITY URINE: 1.01
SQUAMOUS EPITHELIAL CELLS: 2 /HPF
UROBILINOGEN FLD QL: NEGATIVE MG/DL — SIGNIFICANT CHANGE UP
UROBILINOGEN URINE: NORMAL
WBC # BLD: 5.56 K/UL — SIGNIFICANT CHANGE UP (ref 3.8–10.5)
WBC # BLD: 5.89 K/UL — SIGNIFICANT CHANGE UP (ref 3.8–10.5)
WBC # FLD AUTO: 5.56 K/UL — SIGNIFICANT CHANGE UP (ref 3.8–10.5)
WBC # FLD AUTO: 5.89 K/UL — SIGNIFICANT CHANGE UP (ref 3.8–10.5)
WHITE BLOOD CELLS URINE: 7 /HPF

## 2022-02-10 PROCEDURE — 99214 OFFICE O/P EST MOD 30 MIN: CPT

## 2022-02-10 PROCEDURE — 99284 EMERGENCY DEPT VISIT MOD MDM: CPT

## 2022-02-10 PROCEDURE — 93010 ELECTROCARDIOGRAM REPORT: CPT

## 2022-02-10 RX ORDER — VALSARTAN 160 MG/1
160 TABLET, COATED ORAL DAILY
Refills: 0 | Status: ACTIVE | COMMUNITY
Start: 2022-02-10

## 2022-02-10 RX ORDER — ASPIRIN 81 MG
81 TABLET, DELAYED RELEASE (ENTERIC COATED) ORAL DAILY
Refills: 0 | Status: DISCONTINUED | COMMUNITY
End: 2022-02-10

## 2022-02-10 RX ORDER — ATORVASTATIN CALCIUM 20 MG/1
20 TABLET, FILM COATED ORAL
Refills: 0 | Status: ACTIVE | COMMUNITY
Start: 2022-02-10

## 2022-02-10 RX ORDER — SODIUM ZIRCONIUM CYCLOSILICATE 10 G/10G
10 POWDER, FOR SUSPENSION ORAL
Refills: 0 | Status: DISCONTINUED | COMMUNITY
End: 2022-02-10

## 2022-02-10 NOTE — ASSESSMENT
[FreeTextEntry1] : Natanael Yang is seen in the office.  He missed some appointments.  He was at admitted to [facility] in December and he missed an appointment then and then missed a follow-up appointment on February 1.  Overall, he states that he is feeling "good".  His appetite is normal.  He has ongoing bilateral lower extremity edema which she says is somewhat improved.  He denies any fever or chills.  There are no night sweats.  He does not have any hot flushes headaches or dizziness.  There are no balance issues.  There is no chest pain chest pressure or palpitations.  There is no nausea vomiting diarrhea or constipation.  He reports no pains.\par \par I had spoken with his nephrologist, who was unaware that he had a history of renal cell carcinoma.  The assumption was that the kidney was removed for stone disease and that surgery was done at [facility] in [place].  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  He has not received any treatment for this.  He presented to the office today, and we discussed how it is important that his disease be controlled.  We do have an issue and that he has a notably elevated creatinine in the high twos.  The recommended treatment would be Opdivo plus cabozantinib, which is the current favored treatment for patients with metastatic papillary renal cell carcinoma.  We went over the adverse effects that may occur with this drug combination, and he was given written information in regards to the drugs.  Written informed consent was obtained.  A PET scan is requested to further delineate the extent of his disease.  Blood work was requested today, and will received a critical value of 6.6 for his potassium after he left the office.  There was no reported hemolysis.  His BUN was 53 with a creatinine of 3.46.  His previous creatinine on December 7 was 2.97.\par \par He was told to go to the emergency room urgently to receive treatment for this elevated creatinine and also to be evaluated for the reason of his renal deterioration.\par \par Treatment plans which were discussed will be placed on hold pending this evaluation.  All questions were answered to the best of my ability and to his apparent satisfaction.
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
## 2022-02-10 NOTE — ED PROVIDER NOTE - OBJECTIVE STATEMENT
56y Male with PMHx of CKD, s/p nephrectomy of R kidney secondary to renal cancer, DVT on Eliquis presents to the ER for abnormal lab result. Patient reports receiving a call from his hematologist for elevated potassium, unsure of value, per [facility] K+ 6.6 nonhemolyzed with bump in baseline Cr from 2.9 to 3.4. Denies fever, chills, chest pain, palpitations, shortness of breath.

## 2022-02-10 NOTE — REVIEW OF SYSTEMS
[Lower Ext Edema] : lower extremity edema [Fever] : no fever [Chills] : no chills [Night Sweats] : no night sweats [Fatigue] : no fatigue [Recent Change In Weight] : ~T no recent weight change [Eye Pain] : no eye pain [Vision Problems] : no vision problems [Dysphagia] : no dysphagia [Nosebleeds] : no nosebleeds [Odynophagia] : no odynophagia [Mucosal Pain] : no mucosal pain [Chest Pain] : no chest pain [Palpitations] : no palpitations [Shortness Of Breath] : no shortness of breath [Cough] : no cough [Abdominal Pain] : no abdominal pain [Vomiting] : no vomiting [Constipation] : no constipation [Diarrhea] : no diarrhea [Dysuria] : no dysuria [Incontinence] : no incontinence [Joint Pain] : no joint pain [Joint Stiffness] : no joint stiffness [Muscle Pain] : no muscle pain [Skin Rash] : no skin rash [Dizziness] : no dizziness [Difficulty Walking] : no difficulty walking [Anxiety] : no anxiety [Depression] : no depression [Hot Flashes] : no hot flashes [Muscle Weakness] : no muscle weakness [Easy Bleeding] : no tendency for easy bleeding [Easy Bruising] : no tendency for easy bruising

## 2022-02-10 NOTE — ED ADULT TRIAGE NOTE - AS TEMP SITE
[address] SCREENING SCHEDULE   Tests on this list are recommended by your physician but may not be covered, or covered at this frequency, by your insurer. Please check with your insurance carrier before scheduling to verify coverage.    PREVENTATIV abnormal There are no preventive care reminders to display for this patient. Update Health Maintenance if applicable    Flex Sigmoidoscopy Screen  Covered every 5 years No results found for this or any previous visit. No flowsheet data found.      Fecal Occ Please get once after your 65th birthday    Pneumococcal 23 (Pneumovax)  Covered Once after 65 No orders found for this or any previous visit.  Please get once after your 65th birthday    Hepatitis B for Moderate/High Risk       No orders found for this or oral

## 2022-02-10 NOTE — PHYSICAL EXAM
[Fully active, able to carry on all pre-disease performance without restriction] : Status 0 - Fully active, able to carry on all pre-disease performance without restriction [Normal] : no JVD, no calf tenderness, venous stasis changes, varices [de-identified] : trace edema of bilateral ankles

## 2022-02-10 NOTE — ED ADULT NURSE NOTE - NSICDXPASTSURGICALHX_GEN_ALL_CORE_FT
PAST SURGICAL HISTORY:  H/O kidney removal R-side kidney removal due to mass    S/P brain surgery     S/P brain surgery aneurysm clipping

## 2022-02-10 NOTE — ED PROVIDER NOTE - CLINICAL SUMMARY MEDICAL DECISION MAKING FREE TEXT BOX
56y Male with PMHx of CKD, s/p nephrectomy of R kidney secondary to renal cancer, DVT on Eliquis presents to the ER for abnormal lab result. K+ 6.6 nonhemolyzed with bump in baseline Cr from 2.9 to 3.4. Denies fever, chills, chest pain, palpitations, shortness of breath. Vital signs stable, EKG without changes, will get labs/urine, IVF, plan for admission.

## 2022-02-10 NOTE — ED PROVIDER NOTE - NS ED ROS FT
Constitutional: (-) Fever, (-) Chills  Skin: (-) Rashes  Eyes: (-) Visual changes, (-) Discharge, (-) Redness  Ears: (-) Hearing loss, (-)Tinnitus, (-) Ear pain  Nose: (-) Nasal congestion, (-) Runny nose  Mouth/Throat: (-) Sore throat  CV: (-) Chest pain, (-)Palpitations  Resp: (-) Cough, (-) Shortness of breath, (-) Dyspnea on Exertion, (-) Wheezing  GI: (-) Abdominal pain, (-) Nausea, (-) Vomiting, (-) Diarrhea  : (-) Dysuria   MSK: (-) Myalgias  Neuro: (-) Headache

## 2022-02-11 LAB
ALBUMIN SERPL ELPH-MCNC: 3 G/DL — LOW (ref 3.3–5)
ALP SERPL-CCNC: 64 U/L — SIGNIFICANT CHANGE UP (ref 40–120)
ALT FLD-CCNC: 15 U/L — SIGNIFICANT CHANGE UP (ref 12–78)
ANION GAP SERPL CALC-SCNC: 3 MMOL/L — LOW (ref 5–17)
APPEARANCE UR: CLEAR — SIGNIFICANT CHANGE UP
AST SERPL-CCNC: 16 U/L — SIGNIFICANT CHANGE UP (ref 15–37)
BACTERIA # UR AUTO: ABNORMAL
BACTERIA # UR AUTO: ABNORMAL
BASOPHILS # BLD AUTO: 0.04 K/UL — SIGNIFICANT CHANGE UP (ref 0–0.2)
BASOPHILS NFR BLD AUTO: 0.7 % — SIGNIFICANT CHANGE UP (ref 0–2)
BILIRUB SERPL-MCNC: 0.4 MG/DL — SIGNIFICANT CHANGE UP (ref 0.2–1.2)
BILIRUB UR-MCNC: NEGATIVE — SIGNIFICANT CHANGE UP
BUN SERPL-MCNC: 53 MG/DL — HIGH (ref 7–23)
CALCIUM SERPL-MCNC: 8.5 MG/DL — SIGNIFICANT CHANGE UP (ref 8.5–10.1)
CHLORIDE SERPL-SCNC: 118 MMOL/L — HIGH (ref 96–108)
CO2 SERPL-SCNC: 20 MMOL/L — LOW (ref 22–31)
COLOR SPEC: YELLOW — SIGNIFICANT CHANGE UP
COMMENT - URINE: SIGNIFICANT CHANGE UP
CREAT ?TM UR-MCNC: 64 MG/DL — SIGNIFICANT CHANGE UP
CREAT SERPL-MCNC: 3.3 MG/DL — HIGH (ref 0.5–1.3)
CREAT SPEC-SCNC: 111 MG/DL
CREAT/PROT UR: 0.7 RATIO
DIFF PNL FLD: ABNORMAL
EOSINOPHIL # BLD AUTO: 0.18 K/UL — SIGNIFICANT CHANGE UP (ref 0–0.5)
EOSINOPHIL NFR BLD AUTO: 3 % — SIGNIFICANT CHANGE UP (ref 0–6)
EPI CELLS # UR: SIGNIFICANT CHANGE UP
EPI CELLS # UR: SIGNIFICANT CHANGE UP
FLUAV AG NPH QL: SIGNIFICANT CHANGE UP
FLUBV AG NPH QL: SIGNIFICANT CHANGE UP
GLUCOSE SERPL-MCNC: 93 MG/DL — SIGNIFICANT CHANGE UP (ref 70–99)
GLUCOSE UR QL: NEGATIVE MG/DL — SIGNIFICANT CHANGE UP
HCT VFR BLD CALC: 36.2 % — LOW (ref 39–50)
HGB BLD-MCNC: 11 G/DL — LOW (ref 13–17)
HYALINE CASTS # UR AUTO: ABNORMAL /LPF
HYALINE CASTS # UR AUTO: NEGATIVE /LPF — SIGNIFICANT CHANGE UP
IMM GRANULOCYTES NFR BLD AUTO: 0.3 % — SIGNIFICANT CHANGE UP (ref 0–1.5)
KETONES UR-MCNC: NEGATIVE — SIGNIFICANT CHANGE UP
LEUKOCYTE ESTERASE UR-ACNC: ABNORMAL
LYMPHOCYTES # BLD AUTO: 1.46 K/UL — SIGNIFICANT CHANGE UP (ref 1–3.3)
LYMPHOCYTES # BLD AUTO: 24.4 % — SIGNIFICANT CHANGE UP (ref 13–44)
MAGNESIUM SERPL-MCNC: 3.1 MG/DL — HIGH (ref 1.6–2.6)
MCHC RBC-ENTMCNC: 30.4 G/DL — LOW (ref 32–36)
MCHC RBC-ENTMCNC: 31.1 PG — SIGNIFICANT CHANGE UP (ref 27–34)
MCV RBC AUTO: 102.3 FL — HIGH (ref 80–100)
MONOCYTES # BLD AUTO: 0.61 K/UL — SIGNIFICANT CHANGE UP (ref 0–0.9)
MONOCYTES NFR BLD AUTO: 10.2 % — SIGNIFICANT CHANGE UP (ref 2–14)
NEUTROPHILS # BLD AUTO: 3.67 K/UL — SIGNIFICANT CHANGE UP (ref 1.8–7.4)
NEUTROPHILS NFR BLD AUTO: 61.4 % — SIGNIFICANT CHANGE UP (ref 43–77)
NITRITE UR-MCNC: NEGATIVE — SIGNIFICANT CHANGE UP
NRBC # BLD: 0 /100 WBCS — SIGNIFICANT CHANGE UP (ref 0–0)
OSMOLALITY UR: 367 MOSM/KG — SIGNIFICANT CHANGE UP (ref 50–1200)
PH UR: 6 — SIGNIFICANT CHANGE UP (ref 5–8)
PHOSPHATE SERPL-MCNC: 4.1 MG/DL — SIGNIFICANT CHANGE UP (ref 2.5–4.5)
PLATELET # BLD AUTO: 209 K/UL — SIGNIFICANT CHANGE UP (ref 150–400)
POTASSIUM SERPL-MCNC: 5.7 MMOL/L — HIGH (ref 3.5–5.3)
POTASSIUM SERPL-SCNC: 5.7 MMOL/L — HIGH (ref 3.5–5.3)
PROT ?TM UR-MCNC: 22 MG/DL — HIGH (ref 0–12)
PROT SERPL-MCNC: 6.7 GM/DL — SIGNIFICANT CHANGE UP (ref 6–8.3)
PROT UR-MCNC: 30 MG/DL
PROT UR-MCNC: 73 MG/DL
RBC # BLD: 3.54 M/UL — LOW (ref 4.2–5.8)
RBC # FLD: 15.3 % — HIGH (ref 10.3–14.5)
RBC CASTS # UR COMP ASSIST: >50 /HPF (ref 0–4)
RBC CASTS # UR COMP ASSIST: ABNORMAL /HPF (ref 0–4)
SARS-COV-2 RNA SPEC QL NAA+PROBE: SIGNIFICANT CHANGE UP
SODIUM SERPL-SCNC: 141 MMOL/L — SIGNIFICANT CHANGE UP (ref 135–145)
SODIUM UR-SCNC: 99 MMOL/L — SIGNIFICANT CHANGE UP
SP GR SPEC: 1.01 — SIGNIFICANT CHANGE UP (ref 1.01–1.02)
UROBILINOGEN FLD QL: NEGATIVE MG/DL — SIGNIFICANT CHANGE UP
WBC # BLD: 5.98 K/UL — SIGNIFICANT CHANGE UP (ref 3.8–10.5)
WBC # FLD AUTO: 5.98 K/UL — SIGNIFICANT CHANGE UP (ref 3.8–10.5)
WBC UR QL: SIGNIFICANT CHANGE UP
WBC UR QL: SIGNIFICANT CHANGE UP

## 2022-02-11 PROCEDURE — 99223 1ST HOSP IP/OBS HIGH 75: CPT

## 2022-02-11 RX ORDER — DEXTROSE 50 % IN WATER 50 %
50 SYRINGE (ML) INTRAVENOUS ONCE
Refills: 0 | Status: DISCONTINUED | OUTPATIENT
Start: 2022-02-11 | End: 2022-02-11

## 2022-02-11 RX ORDER — AMLODIPINE BESYLATE 2.5 MG/1
5 TABLET ORAL DAILY
Refills: 0 | Status: DISCONTINUED | OUTPATIENT
Start: 2022-02-11 | End: 2022-02-12

## 2022-02-11 RX ORDER — SODIUM ZIRCONIUM CYCLOSILICATE 10 G/10G
10 POWDER, FOR SUSPENSION ORAL ONCE
Refills: 0 | Status: COMPLETED | OUTPATIENT
Start: 2022-02-11 | End: 2022-02-11

## 2022-02-11 RX ORDER — SODIUM BICARBONATE 1 MEQ/ML
650 SYRINGE (ML) INTRAVENOUS THREE TIMES A DAY
Refills: 0 | Status: DISCONTINUED | OUTPATIENT
Start: 2022-02-11 | End: 2022-02-12

## 2022-02-11 RX ORDER — APIXABAN 2.5 MG/1
5 TABLET, FILM COATED ORAL
Refills: 0 | Status: DISCONTINUED | OUTPATIENT
Start: 2022-02-11 | End: 2022-02-12

## 2022-02-11 RX ORDER — SODIUM ZIRCONIUM CYCLOSILICATE 10 G/10G
10 POWDER, FOR SUSPENSION ORAL EVERY 8 HOURS
Refills: 0 | Status: COMPLETED | OUTPATIENT
Start: 2022-02-11 | End: 2022-02-12

## 2022-02-11 RX ORDER — ACETAMINOPHEN 500 MG
650 TABLET ORAL EVERY 6 HOURS
Refills: 0 | Status: DISCONTINUED | OUTPATIENT
Start: 2022-02-11 | End: 2022-02-12

## 2022-02-11 RX ORDER — INSULIN HUMAN 100 [IU]/ML
10 INJECTION, SOLUTION SUBCUTANEOUS ONCE
Refills: 0 | Status: DISCONTINUED | OUTPATIENT
Start: 2022-02-11 | End: 2022-02-11

## 2022-02-11 RX ORDER — HYDROCHLOROTHIAZIDE 25 MG
25 TABLET ORAL DAILY
Refills: 0 | Status: DISCONTINUED | OUTPATIENT
Start: 2022-02-11 | End: 2022-02-12

## 2022-02-11 RX ADMIN — Medication 25 MILLIGRAM(S): at 14:10

## 2022-02-11 RX ADMIN — APIXABAN 5 MILLIGRAM(S): 2.5 TABLET, FILM COATED ORAL at 08:00

## 2022-02-11 RX ADMIN — Medication 650 MILLIGRAM(S): at 22:50

## 2022-02-11 RX ADMIN — AMLODIPINE BESYLATE 5 MILLIGRAM(S): 2.5 TABLET ORAL at 08:00

## 2022-02-11 RX ADMIN — APIXABAN 5 MILLIGRAM(S): 2.5 TABLET, FILM COATED ORAL at 17:55

## 2022-02-11 RX ADMIN — SODIUM ZIRCONIUM CYCLOSILICATE 10 GRAM(S): 10 POWDER, FOR SUSPENSION ORAL at 14:10

## 2022-02-11 RX ADMIN — Medication 650 MILLIGRAM(S): at 14:10

## 2022-02-11 RX ADMIN — SODIUM ZIRCONIUM CYCLOSILICATE 10 GRAM(S): 10 POWDER, FOR SUSPENSION ORAL at 07:59

## 2022-02-11 RX ADMIN — SODIUM ZIRCONIUM CYCLOSILICATE 10 GRAM(S): 10 POWDER, FOR SUSPENSION ORAL at 22:50

## 2022-02-11 RX ADMIN — Medication 650 MILLIGRAM(S): at 07:59

## 2022-02-11 NOTE — PROGRESS NOTE ADULT - ASSESSMENT
55 y/o Male with PMHx of CKD stage 3, Renal cancer s/p R Nephrectomy > 5 yrs ago, DVT s/p IVC Filter on Eliquis presents to the ER for abnormal lab result, pt being admitted for acute on chronic kidney disease    1) BLANCHE on CKD  - Hold Valsartan  - c/w HCO3  - K 5.7 now, c/w Lokelma  -renal aware  - avoid nephrotoxins    2) Hx of R Nephrectomy w/ recurrent Ca on L kidney; s/p bx in Dec which showed Papillary renal Ca  - continued outpt f/u w/ Oncologist    3) DVT on Eliquis  - c/w Eliquis  - cbc stable     4) HTN  - c/w Amlodipine    5) DVT ppx - pt on Eliquis    plan to dc in the am with lokelma if K stabilizes

## 2022-02-11 NOTE — H&P ADULT - ASSESSMENT
55 y/o Male with PMHx of CKD stage 3, Renal cancer s/p R Nephrectomy > 5 yrs ago, DVT s/p IVC Filter on Eliquis presents to the ER for abnormal lab result, pt being admitted for acute on chronic kidney disease    1) BLANCHE on CKD  Hx of R Nephrectomy w/ recurrent Ca on L kidney; s/p bx in Dec which showed Papillary renal Ca  - Hold Valsartan  - c/w HCO3  - K 5.5, will give 1 dose of Lokelma  - f/u Renal Consult; will inform Dr. Kuhn in am  - avoid nephrotoxins    2) DVT on Eliquis  - c/w Eliquis    3) HTN  - c/w Amlodipine    4) DVT ppx - pt on Eliquis       57 y/o Male with PMHx of CKD stage 3, Renal cancer s/p R Nephrectomy > 5 yrs ago, DVT s/p IVC Filter on Eliquis presents to the ER for abnormal lab result, pt being admitted for acute on chronic kidney disease    1) BLANCHE on CKD  - Hold Valsartan  - c/w HCO3  - K 5.5, will give 1 dose of Lokelma  - f/u Renal Consult; will inform Dr. Kuhn in am  - avoid nephrotoxins    2) Hx of R Nephrectomy w/ recurrent Ca on L kidney; s/p bx in Dec which showed Papillary renal Ca  - continued outpt f/u w/ Oncologist    3) DVT on Eliquis  - c/w Eliquis    4) HTN  - c/w Amlodipine    5) DVT ppx - pt on Eliquis

## 2022-02-11 NOTE — PATIENT PROFILE ADULT - FALL HARM RISK - UNIVERSAL INTERVENTIONS
Bed in lowest position, wheels locked, appropriate side rails in place/Call bell, personal items and telephone in reach/Instruct patient to call for assistance before getting out of bed or chair/Non-slip footwear when patient is out of bed/[place] to call system/Physically safe environment - no spills, clutter or unnecessary equipment/Purposeful Proactive Rounding/Room/bathroom lighting operational, light cord in reach

## 2022-02-11 NOTE — H&P ADULT - NSHPPHYSICALEXAM_GEN_ALL_CORE
PHYSICAL EXAM:    Vital Signs Last 24 Hrs  T(C): 36.5 (11 Feb 2022 04:09), Max: 37.1 (10 Feb 2022 20:26)  T(F): 97.7 (11 Feb 2022 04:09), Max: 98.7 (10 Feb 2022 20:26)  HR: 60 (11 Feb 2022 04:09) (58 - 63)  BP: 124/87 (11 Feb 2022 04:09) (116/73 - 125/82)  BP(mean): --  RR: 17 (11 Feb 2022 04:09) (16 - 18)  SpO2: 98% (11 Feb 2022 00:48) (98% - 98%)    GENERAL: Pt lying in bed comfortably in NAD  HEENT:  Atraumatic, EOMI, conjunctiva and sclera clear, MMM  NECK: Supple,  CHEST/LUNG: Clear to auscultation bilaterally;  HEART: Regular rate and rhythm;   ABDOMEN: Bowel sounds present; Soft, Nontender, Nondistended.   EXTREMITIES:  2+ Peripheral Pulses, brisk capillary refill. B/L LE 1+ pitting edema R> L  NEUROLOGICAL:  Alert & Oriented X3. No deficits   MSK: FROM x 4 extremities   SKIN: No rashes or lesions

## 2022-02-11 NOTE — PROGRESS NOTE ADULT - SUBJECTIVE AND OBJECTIVE BOX
Patient is a 56y old  Male who presents with a chief complaint of Acute on Chronic renal failure (2022 07:36)      INTERVAL HPI/OVERNIGHT EVENTS: none     MEDICATIONS  (STANDING):  amLODIPine   Tablet 5 milliGRAM(s) Oral daily  apixaban 5 milliGRAM(s) Oral two times a day  hydrochlorothiazide 25 milliGRAM(s) Oral daily  sodium bicarbonate 650 milliGRAM(s) Oral three times a day  sodium zirconium cyclosilicate 10 Gram(s) Oral every 8 hours    MEDICATIONS  (PRN):  acetaminophen     Tablet .. 650 milliGRAM(s) Oral every 6 hours PRN Temp greater or equal to 38C (100.4F), Mild Pain (1 - 3)      Allergies    No Known Allergies    Intolerances        REVIEW OF SYSTEMS:  CONSTITUTIONAL: No fever, weight loss, or fatigue  EYES: No eye pain, visual disturbances, or discharge  ENMT:  No difficulty hearing, tinnitus, vertigo; No sinus or throat pain  NECK: No pain or stiffness  BREASTS: No pain, masses, or nipple discharge  RESPIRATORY: No cough, wheezing, chills or hemoptysis; No shortness of breath  CARDIOVASCULAR: No chest pain, palpitations, dizziness, or leg swelling  GASTROINTESTINAL: No abdominal or epigastric pain. No nausea, vomiting, or hematemesis; No diarrhea or constipation. No melena or hematochezia.  GENITOURINARY: No dysuria, frequency, hematuria, or incontinence  NEUROLOGICAL: No headaches, memory loss, loss of strength, numbness, or tremors  SKIN: No itching, burning, rashes, or lesions   LYMPH NODES: No enlarged glands  ENDOCRINE: No heat or cold intolerance; No hair loss  MUSCULOSKELETAL: No joint pain or swelling; No muscle, back, or extremity pain  PSYCHIATRIC: No depression, anxiety, mood swings, or difficulty sleeping  HEME/LYMPH: No easy bruising, or bleeding gums  ALLERGY AND IMMUNOLOGIC: No hives or eczema    Vital Signs Last 24 Hrs  T(C): 36.8 (2022 12:36), Max: 37.1 (10 Feb 2022 20:26)  T(F): 98.2 (2022 12:36), Max: 98.7 (10 Feb 2022 20:26)  HR: 61 (2022 12:36) (58 - 63)  BP: 124/84 (2022 12:36) (116/73 - 125/82)  BP(mean): --  RR: 18 (2022 12:36) (16 - 18)  SpO2: 97% (2022 12:36) (97% - 98%)    PHYSICAL EXAM:  GENERAL: NAD, well-groomed, well-developed  HEAD:  Atraumatic, Normocephalic  EYES: EOMI, PERRLA, conjunctiva and sclera clear  ENMT: No tonsillar erythema, exudates, or enlargement; Moist mucous membranes, Good dentition, No lesions  NECK: Supple, No JVD, Normal thyroid  NERVOUS SYSTEM:  Alert & Oriented X3, Good concentration; Motor Strength 5/5 B/L upper and lower extremities; DTRs 2+ intact and symmetric  CHEST/LUNG: Clear to percussion bilaterally; No rales, rhonchi, wheezing, or rubs  HEART: Regular rate and rhythm; No murmurs, rubs, or gallops  ABDOMEN: Soft, Nontender, Nondistended; Bowel sounds present  EXTREMITIES:  2+ Peripheral Pulses, No clubbing, cyanosis, or edema  LYMPH: No lymphadenopathy noted  SKIN: No rashes or lesions    LABS:                        11.0   5.98  )-----------( 209      ( 2022 08:02 )             36.2     02-11    141  |  118<H>  |  53<H>  ----------------------------<  93  5.7<H>   |  20<L>  |  3.30<H>    Ca    8.5      2022 08:02  Phos  4.1     02-11  Mg     3.1     02-11    TPro  6.7  /  Alb  3.0<L>  /  TBili  0.4  /  DBili  x   /  AST  16  /  ALT  15  /  AlkPhos  64  02-11      Urinalysis Basic - ( 2022 09:58 )    Color: Yellow / Appearance: Clear / S.010 / pH: x  Gluc: x / Ketone: Negative  / Bili: Negative / Urobili: Negative mg/dL   Blood: x / Protein: 30 mg/dL / Nitrite: Negative   Leuk Esterase: Trace / RBC: 25-50 /HPF / WBC 3-5   Sq Epi: x / Non Sq Epi: Occasional / Bacteria: Occasional      CAPILLARY BLOOD GLUCOSE          RADIOLOGY & ADDITIONAL TESTS:    Imaging Personally Reviewed:  [ ] YES  [ ] NO    Consultant(s) Notes Reviewed:  [ ] YES  [ ] NO    Care Discussed with Consultants/Other Providers [ ] YES  [ ] NO

## 2022-02-11 NOTE — H&P ADULT - NSHPLABSRESULTS_GEN_ALL_CORE
T(C): 36.8 (22 @ 00:48), Max: 37.1 (02-10-22 @ 20:26)  HR: 58 (22 @ 00:48) (58 - 63)  BP: 116/73 (22 @ 00:48) (116/73 - 125/82)  RR: 18 (22 @ 00:48) (16 - 18)  SpO2: 98% (22 @ 00:48) (98% - 98%)                        11.6   5.56  )-----------( 232      ( 10 Feb 2022 21:25 )             38.0     02-10    143  |  116<H>  |  55<H>  ----------------------------<  81  5.5<H>   |  21<L>  |  3.65<H>    Ca    8.2<L>      10 Feb 2022 21:25    TPro  7.5  /  Alb  3.2<L>  /  TBili  0.3  /  DBili  x   /  AST  17  /  ALT  18  /  AlkPhos  69  02-10    LIVER FUNCTIONS - ( 10 Feb 2022 21:25 )  Alb: 3.2 g/dL / Pro: 7.5 gm/dL / ALK PHOS: 69 U/L / ALT: 18 U/L / AST: 17 U/L / GGT: x             Urinalysis Basic - ( 10 Feb 2022 22:32 )    Color: Yellow / Appearance: Clear / S.015 / pH: x  Gluc: x / Ketone: Negative  / Bili: Negative / Urobili: Negative mg/dL   Blood: x / Protein: 30 mg/dL / Nitrite: Negative   Leuk Esterase: Small / RBC: x / WBC x   Sq Epi: x / Non Sq Epi: x / Bacteria: x          acetaminophen     Tablet .. 650 milliGRAM(s) Oral every 6 hours PRN  amLODIPine   Tablet 5 milliGRAM(s) Oral daily  apixaban 5 milliGRAM(s) Oral two times a day  sodium bicarbonate 650 milliGRAM(s) Oral three times a day T(C): 36.8 (22 @ 00:48), Max: 37.1 (02-10-22 @ 20:26)  HR: 58 (22 @ 00:48) (58 - 63)  BP: 116/73 (22 @ 00:48) (116/73 - 125/82)  RR: 18 (22 @ 00:48) (16 - 18)  SpO2: 98% (22 @ 00:48) (98% - 98%)                        11.6   5.56  )-----------( 232      ( 10 Feb 2022 21:25 )             38.0     02-10    143  |  116<H>  |  55<H>  ----------------------------<  81  5.5<H>   |  21<L>  |  3.65<H>    Ca    8.2<L>      10 Feb 2022 21:25    TPro  7.5  /  Alb  3.2<L>  /  TBili  0.3  /  DBili  x   /  AST  17  /  ALT  18  /  AlkPhos  69  02-10    LIVER FUNCTIONS - ( 10 Feb 2022 21:25 )  Alb: 3.2 g/dL / Pro: 7.5 gm/dL / ALK PHOS: 69 U/L / ALT: 18 U/L / AST: 17 U/L / GGT: x             Urinalysis Basic - ( 10 Feb 2022 22:32 )    Color: Yellow / Appearance: Clear / S.015 / pH: x  Gluc: x / Ketone: Negative  / Bili: Negative / Urobili: Negative mg/dL   Blood: x / Protein: 30 mg/dL / Nitrite: Negative   Leuk Esterase: Small / RBC: x / WBC x   Sq Epi: x / Non Sq Epi: x / Bacteria: x      acetaminophen     Tablet .. 650 milliGRAM(s) Oral every 6 hours PRN  amLODIPine   Tablet 5 milliGRAM(s) Oral daily  apixaban 5 milliGRAM(s) Oral two times a day  sodium bicarbonate 650 milliGRAM(s) Oral three times a day
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
## 2022-02-11 NOTE — H&P ADULT - HISTORY OF PRESENT ILLNESS
57 y/o Male with PMHx of CKD, Renal cancer s/p R Nephrectomy, DVT on Eliquis presents to the ER for abnormal lab result. Patient reports receiving a call from his hematologist for elevated potassium, unsure of value, per [facility] K+ 6.6 nonhemolyzed with bump in baseline Cr from 2.9 to 3.4. Denies fever, chills, chest pain, palpitations, shortness of breath.       55 y/o Male with PMHx of CKD stage 3, Renal cancer s/p R Nephrectomy, DVT on Eliquis presents to the ER for abnormal lab result. Patient reports receiving a call from his hematologist for elevated potassium, unsure of value, per Sadie BURT K+ 6.6 nonhemolyzed Cr 3.4. Of note pt reports he saw his Nephrologist; SOBIA Ann, 3 weeks ago, ad blood work done and was told his renal function was stable. Pt reports during that visit he was started on Valsartan and HCO3. Pt reports LE edema however states it is improved, denies cp, SOB, weakness or dec urine output.     In ED vitals stable. Labs sig for K 5.5, HCO3 21, BUN/Cr 55/3.65, see below for rest of labs       57 y/o Male with PMHx of CKD stage 3, Renal cancer s/p R Nephrectomy, DVT on Eliquis presents to the ER for abnormal lab result. Patient reports receiving a call from his Oncologist for elevated potassium, unsure of value, per Sadie BURT K+ 6.6 nonhemolyzed Cr 3.4. Of note pt reports he saw his Nephrologist; SOBIA Ann, 3 weeks ago, ad blood work done and was told his renal function was stable. Pt reports during that visit he was started on Valsartan and HCO3. Pt reports LE edema however states it is improved, denies cp, SOB, weakness or dec urine output.     In ED vitals stable. Labs sig for K 5.5, HCO3 21, BUN/Cr 55/3.65, see below for rest of labs.

## 2022-02-12 ENCOUNTER — TRANSCRIPTION ENCOUNTER (OUTPATIENT)
Age: 57
End: 2022-02-12

## 2022-02-12 VITALS
TEMPERATURE: 98 F | RESPIRATION RATE: 18 BRPM | HEART RATE: 109 BPM | DIASTOLIC BLOOD PRESSURE: 78 MMHG | SYSTOLIC BLOOD PRESSURE: 131 MMHG | OXYGEN SATURATION: 97 %

## 2022-02-12 LAB
ANION GAP SERPL CALC-SCNC: 5 MMOL/L — SIGNIFICANT CHANGE UP (ref 5–17)
BUN SERPL-MCNC: 49 MG/DL — HIGH (ref 7–23)
CALCIUM SERPL-MCNC: 8.7 MG/DL — SIGNIFICANT CHANGE UP (ref 8.5–10.1)
CHLORIDE SERPL-SCNC: 115 MMOL/L — HIGH (ref 96–108)
CO2 SERPL-SCNC: 20 MMOL/L — LOW (ref 22–31)
CREAT SERPL-MCNC: 2.95 MG/DL — HIGH (ref 0.5–1.3)
CULTURE RESULTS: SIGNIFICANT CHANGE UP
GLUCOSE SERPL-MCNC: 103 MG/DL — HIGH (ref 70–99)
POTASSIUM SERPL-MCNC: 5.4 MMOL/L — HIGH (ref 3.5–5.3)
POTASSIUM SERPL-SCNC: 5.4 MMOL/L — HIGH (ref 3.5–5.3)
SODIUM SERPL-SCNC: 140 MMOL/L — SIGNIFICANT CHANGE UP (ref 135–145)
SPECIMEN SOURCE: SIGNIFICANT CHANGE UP

## 2022-02-12 PROCEDURE — 99239 HOSP IP/OBS DSCHRG MGMT >30: CPT

## 2022-02-12 RX ORDER — VALSARTAN 80 MG/1
1 TABLET ORAL
Qty: 0 | Refills: 0 | DISCHARGE

## 2022-02-12 RX ORDER — SODIUM ZIRCONIUM CYCLOSILICATE 10 G/10G
1 POWDER, FOR SUSPENSION ORAL
Qty: 13 | Refills: 0
Start: 2022-02-12 | End: 2022-03-13

## 2022-02-12 RX ORDER — SODIUM ZIRCONIUM CYCLOSILICATE 10 G/10G
10 POWDER, FOR SUSPENSION ORAL ONCE
Refills: 0 | Status: COMPLETED | OUTPATIENT
Start: 2022-02-12 | End: 2022-02-12

## 2022-02-12 RX ORDER — SODIUM BICARBONATE 1 MEQ/ML
1 SYRINGE (ML) INTRAVENOUS
Qty: 0 | Refills: 0 | DISCHARGE
Start: 2022-02-12

## 2022-02-12 RX ORDER — SODIUM BICARBONATE 1 MEQ/ML
0 SYRINGE (ML) INTRAVENOUS
Qty: 0 | Refills: 0 | DISCHARGE

## 2022-02-12 RX ADMIN — Medication 25 MILLIGRAM(S): at 07:01

## 2022-02-12 RX ADMIN — AMLODIPINE BESYLATE 5 MILLIGRAM(S): 2.5 TABLET ORAL at 06:59

## 2022-02-12 RX ADMIN — APIXABAN 5 MILLIGRAM(S): 2.5 TABLET, FILM COATED ORAL at 06:59

## 2022-02-12 RX ADMIN — Medication 650 MILLIGRAM(S): at 13:40

## 2022-02-12 RX ADMIN — SODIUM ZIRCONIUM CYCLOSILICATE 10 GRAM(S): 10 POWDER, FOR SUSPENSION ORAL at 12:45

## 2022-02-12 RX ADMIN — SODIUM ZIRCONIUM CYCLOSILICATE 10 GRAM(S): 10 POWDER, FOR SUSPENSION ORAL at 06:58

## 2022-02-12 RX ADMIN — Medication 650 MILLIGRAM(S): at 06:58

## 2022-02-12 NOTE — DISCHARGE NOTE PROVIDER - NSDCMRMEDTOKEN_GEN_ALL_CORE_FT
amLODIPine 5 mg oral tablet: 1 tab(s) orally once a day  apixaban 5 mg oral tablet: Take 2 tabs orally every 12 hours until 12/4/21, then take 1 tab orally every 12 hours    hydroCHLOROthiazide 25 mg oral tablet: 1 tab(s) orally once a day  Lokelma 10 g oral powder for reconstitution: 1 dose(s) orally 3 times a week   sodium bicarbonate 650 mg oral tablet: 1 tab(s) orally 3 times a day

## 2022-02-12 NOTE — PROGRESS NOTE ADULT - SUBJECTIVE AND OBJECTIVE BOX
NEPHROLOGY PROGRESS NOTE    CHIEF COMPLAINT:  BLANCHE/CKD    HPI:  Renal function and K improved.    ROS:  no SOB    EXAM:  T(F): 98.3 (22 @ 11:32)  HR: 109 (22 @ 11:32)  BP: 131/78 (22 @ 11:32)  RR: 18 (22 @ 11:32)  SpO2: 97% (22 @ 11:32)    Conversant, in no apparent distress  Normal respiratory effort, lungs clear bilaterally  Heart RRR with no murmur, no peripheral edema         LABS                             11.0   5.98  )-----------( 209      ( 2022 08:02 )             36.2              140  |  115<H>  |  49<H>  ----------------------------<  103<H>  5.4<H>   |  20<L>  |  2.95<H>    Ca    8.7      2022 07:07  Phos  4.1       Mg     3.1         TPro  6.7  /  Alb  3.0<L>  /  TBili  0.4  /  DBili  x   /  AST  16  /  ALT  15  /  AlkPhos  64  02-    Urinalysis Basic - ( 2022 09:58 )  Color: Yellow / Appearance: Clear / S.010 / pH: x  Gluc: x / Ketone: Negative  / Bili: Negative / Urobili: Negative mg/dL   Blood: x / Protein: 30 mg/dL / Nitrite: Negative   Leuk Esterase: Trace / RBC: 25-50 /HPF / WBC 3-5   Sq Epi: x / Non Sq Epi: Occasional / Bacteria: Occasional        Assessment   BLANCHE CKD 3-4 ; solitary kidney with mass  Hyperkalemia     Plan  No objection dc home if K < 5.5;   Maintain Lokelma 10g three times weekly   Low K diet  Follow up with personal nephrologist

## 2022-02-12 NOTE — DISCHARGE NOTE NURSING/CASE MANAGEMENT/SOCIAL WORK - NSDCPEFALRISK_GEN_ALL_CORE
For information on Fall & Injury Prevention, visit: [email or web address] OR  [email or web address] OR  https://www.cdc.gov/steadi/patient.html

## 2022-02-12 NOTE — DISCHARGE NOTE PROVIDER - CARE PROVIDER_API CALL
Jarek Murillo (MD)  Hematology; Internal Medicine; Medical Oncology  [address] [place] [address]  Phone: [phone]  Fax: [phone]  Follow Up Time:     nephrologist,   Phone: (   )    -  Fax: (   )    -  Follow Up Time:

## 2022-02-12 NOTE — DISCHARGE NOTE PROVIDER - HOSPITAL COURSE
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

## 2022-02-12 NOTE — DISCHARGE NOTE PROVIDER - NSDCCPCAREPLAN_GEN_ALL_CORE_FT
PRINCIPAL DISCHARGE DIAGNOSIS  Diagnosis: Acute kidney injury superimposed on CKD  Assessment and Plan of Treatment: 57 y/o Male with PMHx of CKD stage 3, Renal cancer s/p R Nephrectomy > 5 yrs ago, DVT s/p IVC Filter on Eliquis presents to the ER for hyperkalemia after started valsartan   1) BLANCHE on CKD  - Hold Valsartan  - c/w bicarbonate   - K 5.7>>5.4 after  Lokelma  - c/w lokelma 3 times a week and start hydrochlorothiazide which helps lower potassium and blood pressure   -needs close follow up with nephrologist and oncologist.   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

## 2022-02-16 DIAGNOSIS — N17.9 ACUTE KIDNEY FAILURE, UNSPECIFIED: ICD-10-CM

## 2022-02-16 DIAGNOSIS — Z86.718 PERSONAL HISTORY OF OTHER VENOUS THROMBOSIS AND EMBOLISM: ICD-10-CM

## 2022-02-16 DIAGNOSIS — Z90.5 ACQUIRED ABSENCE OF KIDNEY: ICD-10-CM

## 2022-02-16 DIAGNOSIS — E87.5 HYPERKALEMIA: ICD-10-CM

## 2022-02-16 DIAGNOSIS — Z79.01 LONG TERM (CURRENT) USE OF ANTICOAGULANTS: ICD-10-CM

## 2022-02-16 DIAGNOSIS — I12.9 HYPERTENSIVE CHRONIC KIDNEY DISEASE WITH STAGE 1 THROUGH STAGE 4 CHRONIC KIDNEY DISEASE, OR UNSPECIFIED CHRONIC KIDNEY DISEASE: ICD-10-CM

## 2022-02-16 DIAGNOSIS — N18.30 CHRONIC KIDNEY DISEASE, STAGE 3 UNSPECIFIED: ICD-10-CM

## 2022-02-16 DIAGNOSIS — C64.9 MALIGNANT NEOPLASM OF UNSPECIFIED KIDNEY, EXCEPT RENAL PELVIS: ICD-10-CM

## 2022-03-03 ENCOUNTER — APPOINTMENT (OUTPATIENT)
Dept: NUCLEAR MEDICINE | Facility: IMAGING CENTER | Age: 57
End: 2022-03-03

## 2022-03-03 ENCOUNTER — RESULT REVIEW (OUTPATIENT)
Age: 57
End: 2022-03-03

## 2022-03-04 ENCOUNTER — NON-APPOINTMENT (OUTPATIENT)
Age: 57
End: 2022-03-04

## 2022-03-07 NOTE — ED PROVIDER NOTE - CONSTITUTIONAL MANNER
Airway patent, TM normal bilaterally, normal appearing mouth, nose, throat, neck supple with full range of motion, no cervical adenopathy. appropriate for situation

## 2022-03-15 ENCOUNTER — APPOINTMENT (OUTPATIENT)
Dept: NUCLEAR MEDICINE | Facility: IMAGING CENTER | Age: 57
End: 2022-03-15
Payer: MEDICAID

## 2022-03-15 ENCOUNTER — OUTPATIENT (OUTPATIENT)
Dept: OUTPATIENT SERVICES | Facility: HOSPITAL | Age: 57
LOS: 1 days | End: 2022-03-15
Payer: MEDICAID

## 2022-03-15 DIAGNOSIS — C64.9 MALIGNANT NEOPLASM OF UNSPECIFIED KIDNEY, EXCEPT RENAL PELVIS: ICD-10-CM

## 2022-03-15 DIAGNOSIS — Z90.5 ACQUIRED ABSENCE OF KIDNEY: Chronic | ICD-10-CM

## 2022-03-15 DIAGNOSIS — Z98.89 OTHER SPECIFIED POSTPROCEDURAL STATES: Chronic | ICD-10-CM

## 2022-03-15 PROCEDURE — 78815 PET IMAGE W/CT SKULL-THIGH: CPT

## 2022-03-15 PROCEDURE — 78815 PET IMAGE W/CT SKULL-THIGH: CPT | Mod: 26,PI

## 2022-03-15 PROCEDURE — A9552: CPT

## 2022-03-17 ENCOUNTER — OUTPATIENT (OUTPATIENT)
Dept: OUTPATIENT SERVICES | Facility: HOSPITAL | Age: 57
LOS: 1 days | Discharge: HOME | End: 2022-03-17

## 2022-03-17 DIAGNOSIS — Z90.5 ACQUIRED ABSENCE OF KIDNEY: Chronic | ICD-10-CM

## 2022-03-17 DIAGNOSIS — C64.9 MALIGNANT NEOPLASM OF UNSPECIFIED KIDNEY, EXCEPT RENAL PELVIS: ICD-10-CM

## 2022-03-17 DIAGNOSIS — Z98.89 OTHER SPECIFIED POSTPROCEDURAL STATES: Chronic | ICD-10-CM

## 2022-03-18 NOTE — CONSULT LETTER
[Dear  ___] : Dear  [unfilled], [Courtesy Letter:] : I had the pleasure of seeing your patient, [unfilled], in my office today. [Please see my note below.] : Please see my note below. [Consult Closing:] : Thank you very much for allowing me to participate in the care of this patient.  If you have any questions, please do not hesitate to contact me. [Sincerely,] : Sincerely, [DrIvette  ___] : Dr. ARMSTRONG

## 2022-03-21 ENCOUNTER — APPOINTMENT (OUTPATIENT)
Dept: HEMATOLOGY ONCOLOGY | Facility: CLINIC | Age: 57
End: 2022-03-21
Payer: MEDICAID

## 2022-03-21 ENCOUNTER — RESULT REVIEW (OUTPATIENT)
Age: 57
End: 2022-03-21

## 2022-03-21 VITALS
OXYGEN SATURATION: 99 % | DIASTOLIC BLOOD PRESSURE: 83 MMHG | HEIGHT: 73.5 IN | BODY MASS INDEX: 34.26 KG/M2 | HEART RATE: 55 BPM | RESPIRATION RATE: 16 BRPM | WEIGHT: 264.11 LBS | SYSTOLIC BLOOD PRESSURE: 133 MMHG | TEMPERATURE: 97.9 F

## 2022-03-21 LAB
ALBUMIN SERPL ELPH-MCNC: 4.2 G/DL
ALP BLD-CCNC: 75 U/L
ALT SERPL-CCNC: 8 U/L
ANION GAP SERPL CALC-SCNC: 12 MMOL/L
AST SERPL-CCNC: 12 U/L
BASOPHILS # BLD AUTO: 0.02 K/UL — SIGNIFICANT CHANGE UP (ref 0–0.2)
BASOPHILS NFR BLD AUTO: 0.4 % — SIGNIFICANT CHANGE UP (ref 0–2)
BILIRUB SERPL-MCNC: 0.3 MG/DL
BUN SERPL-MCNC: 39 MG/DL
CALCIUM SERPL-MCNC: 8.9 MG/DL
CHLORIDE SERPL-SCNC: 112 MMOL/L
CO2 SERPL-SCNC: 19 MMOL/L
CREAT SERPL-MCNC: 3.11 MG/DL
EGFR: 23 ML/MIN/1.73M2
EOSINOPHIL # BLD AUTO: 0.14 K/UL — SIGNIFICANT CHANGE UP (ref 0–0.5)
EOSINOPHIL NFR BLD AUTO: 2.8 % — SIGNIFICANT CHANGE UP (ref 0–6)
GLUCOSE SERPL-MCNC: 100 MG/DL
HCT VFR BLD CALC: 39.2 % — SIGNIFICANT CHANGE UP (ref 39–50)
HGB BLD-MCNC: 12.1 G/DL — LOW (ref 13–17)
IMM GRANULOCYTES NFR BLD AUTO: 0.2 % — SIGNIFICANT CHANGE UP (ref 0–1.5)
LDH SERPL-CCNC: 159 U/L
LYMPHOCYTES # BLD AUTO: 1.14 K/UL — SIGNIFICANT CHANGE UP (ref 1–3.3)
LYMPHOCYTES # BLD AUTO: 22.5 % — SIGNIFICANT CHANGE UP (ref 13–44)
MCHC RBC-ENTMCNC: 30.9 G/DL — LOW (ref 32–36)
MCHC RBC-ENTMCNC: 31 PG — SIGNIFICANT CHANGE UP (ref 27–34)
MCV RBC AUTO: 100.5 FL — HIGH (ref 80–100)
MONOCYTES # BLD AUTO: 0.39 K/UL — SIGNIFICANT CHANGE UP (ref 0–0.9)
MONOCYTES NFR BLD AUTO: 7.7 % — SIGNIFICANT CHANGE UP (ref 2–14)
NEUTROPHILS # BLD AUTO: 3.37 K/UL — SIGNIFICANT CHANGE UP (ref 1.8–7.4)
NEUTROPHILS NFR BLD AUTO: 66.4 % — SIGNIFICANT CHANGE UP (ref 43–77)
NRBC # BLD: 0 /100 WBCS — SIGNIFICANT CHANGE UP (ref 0–0)
PLATELET # BLD AUTO: 242 K/UL — SIGNIFICANT CHANGE UP (ref 150–400)
POTASSIUM SERPL-SCNC: 5.1 MMOL/L
PROT SERPL-MCNC: 6.8 G/DL
RBC # BLD: 3.9 M/UL — LOW (ref 4.2–5.8)
RBC # FLD: 15.1 % — HIGH (ref 10.3–14.5)
SODIUM SERPL-SCNC: 143 MMOL/L
WBC # BLD: 5.07 K/UL — SIGNIFICANT CHANGE UP (ref 3.8–10.5)
WBC # FLD AUTO: 5.07 K/UL — SIGNIFICANT CHANGE UP (ref 3.8–10.5)

## 2022-03-21 PROCEDURE — 99214 OFFICE O/P EST MOD 30 MIN: CPT

## 2022-03-21 NOTE — ASSESSMENT
[Palliative Care Plan] : not applicable at this time [FreeTextEntry1] : Mr. Yang is seen in the office in follow-up today.  He did not contact us he was told by his insurance company of the PET scan would not be permitted.  However, it was approved and it was finally performed.  I last saw him 6 weeks ago.  He states that he feels "".  He reports no pains or fatigue.  His appetite is good.  He gained a few pounds.  There is no headaches or dizziness.  There are no balance issues.  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  There were no GI complaints.  There is no hematuria.  He has no muscle aches or joint pains.  There is no cough or shortness of breath.  There is no chest pain chest pressure or palpitations.  He reports that he usually has some edema of his ankles.  There are no fevers or chills.\par \par On physical examination, he is in no acute distress.  His performance status is 0.  The neck examination is normal.  The chest is clear and the heart examination is normal.  There is trace edema of the ankles.  The abdominal examination does not reveal any notable abnormality.  There is no spinal column or chest wall tenderness to palpation or percussion.\par \par The PET scan results were reviewed with him.  He has multiple sites of metastasis including multiple metastases within the remaining kidney.  When I saw him in February, his potassium was elevated and his creatinine was approximately 3.5, previously about 3.  He tells me that he saw his nephrologist just recently.  He was started on Lokelma.  He has an appointment to see his nephrologist tomorrow.  I do not have any recent blood work and they will be checked today.\par \par For papillary renal cell carcinoma, the prevailing preferred treatment regimen would be nivolumab plus cabozantinib.  The results and clear cell carcinoma were published in the [place] Journal of Medicine 1 year ago and it was approved as a first-line therapy for clear-cell carcinoma.  Cabozantinib has some benefit over sunitinib in patients with papillary renal cell carcinoma, and the current prevailing recommendation amongst experts in kidney cancer is that nivolumab plus cabozantinib should be the first-line treatment option for patients with metastatic papillary carcinoma.\par \par We went over the adverse effects that may occur, and obviously one issue would be the potential for damage to his kidney.  I have given him my card.  He was given written information about cabozantinib and nivolumab, and I asked him to give my phone number to his nephrologist so that I can discuss his case with him.  We will need to work closely together to deal with any potential toxicity.  There is the potential for damage to the kidney resulting in the need for dialysis and we discussed this as well.\par \par All questions were answered to the best of my ability and to his apparent satisfaction.  Written informed consent was obtained and I will speak with him once again after he spoken to his nephrologist.\par \par The imaging studies from his PET scan were personally reviewed by me.
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
## 2022-03-21 NOTE — REVIEW OF SYSTEMS
[Lower Ext Edema] : lower extremity edema [Vision Problems] : vision problems [Negative] : ENT [Fever] : no fever [Chills] : no chills [Fatigue] : no fatigue [Recent Change In Weight] : ~T no recent weight change [Dysphagia] : no dysphagia [Nosebleeds] : no nosebleeds [Odynophagia] : no odynophagia [Mucosal Pain] : no mucosal pain [Chest Pain] : no chest pain [Palpitations] : no palpitations [Shortness Of Breath] : no shortness of breath [Cough] : no cough [SOB on Exertion] : no shortness of breath during exertion [Abdominal Pain] : no abdominal pain [Vomiting] : no vomiting [Constipation] : no constipation [Diarrhea] : no diarrhea [Dysuria] : no dysuria [Incontinence] : no incontinence [Joint Pain] : no joint pain [Joint Stiffness] : no joint stiffness [Muscle Pain] : no muscle pain [Skin Rash] : no skin rash [Dizziness] : no dizziness [Difficulty Walking] : no difficulty walking [Anxiety] : no anxiety [Depression] : no depression [Hot Flashes] : no hot flashes [Muscle Weakness] : no muscle weakness [Easy Bleeding] : no tendency for easy bleeding [Easy Bruising] : no tendency for easy bruising [de-identified] : no paresthesias

## 2022-03-21 NOTE — PHYSICAL EXAM
[Fully active, able to carry on all pre-disease performance without restriction] : Status 0 - Fully active, able to carry on all pre-disease performance without restriction [Normal] : affect appropriate [de-identified] : trace edema

## 2022-03-21 NOTE — RESULTS/DATA
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  \par Severe acute inflammation\par Cytology slide and cell block are composed of exuberant acute inflammation and histiocytes..\par \par No organisms seen on GMS or AFB stains.\par Screened by:IAIN Messina ([facility])\par Verified by:LOREN HENDERSON M.D.\par ***********************************************\par Final Diagnosis\par KIDNEY, RIGHT, FNA AND CELL BLOCK\par \par POSITIVE FOR MALIGNANT CELLS.  \par Cytology and immunophenotype findings are consistent with renal cell carcinoma with papillary features, see comment.\par \par Comment: Cytology slides and cell block is composed of crowded 3-dimensional groups, papillary-like structures and single lying atypical epithelial cells with enlarged nuclei containing prominent nucleoli and vacuolated cytoplasm, in a background of a marked inflammatory process and tumor necrosis.\par \par The tumor cells are: \par 	Jeremias-ep4:	Negative\par 	B72.3:	Negative\par 	CK7:	Positive\par 	CK20:	Negative\par 	Calretenin:	Negative\par 	CK5/6:	Negative\par 	Glut:	Negative\par 	PAX-8:	Positive\par Correlation with clinical presentation and  imaging studies is essential.\par This case was reviewed for  at the cytology conference on 02/27/13 and with  pathologist  on 03/04/13.\par Dr. Vinson was notified of the final diagnosis on 03/04/13.	\par Screened by:Deejay Hall CT(ASCP)\par Verified by:Xochilt Crook MD\par (Electronically Signed Out)\par **********************************\par EXAM: CT BRAIN\par PROCEDURE DATE: 11/29/2021\par INTERPRETATION: CT OF THE HEAD WITHOUT CONTRAST\par CLINICAL INDICATION: Renal carcinoma, metastatic disease workup.\par TECHNIQUE: Volumetric CT acquisition was performed through the brain and reviewed using brain and bone window technique. Sagittal and coronal reconstructed images were obtained. Dose optimization techniques were utilized including kVp/mA modulation along with iterative reconstructions.\par COMPARISON: CT brain, 12/7/2015.\par FINDINGS:\par Examination is limited without intravenous contrast administration.\par The ventricular and sulcal size and configuration is age appropriate. There are scattered white matter hypodensities which are nonspecific but most commonly represent chronic microvascular ischemic changes. There is no acute loss of gray-white differentiation. There is a chronic lacunar infarct in the right inferior basal ganglia. There is a chronic lacunar infarct in the left cerebellum.\par there is no evidence of mass effect, midline shift, acute intracranial hemorrhage, or extra-axial collections.\par There has been prior right pterional craniotomy and aneurysm clip in the right sylvian fissure. Visualized globes are symmetric. There is a retention cyst/polyp in the right sphenoid sinus. The tympanomastoid air cells are clear.\par IMPRESSION:\par Limited study without intravenous contrast administration for evaluation of metastatic disease. Follow-up with MRI of the brain with and without contrast is suggested.\par No evidence of acute lobar infarct, intracranial hemorrhage or mass effect.\par --- End of Report ---\par WILL HAILE MD; Attending Radiologist\par This document has been electronically signed. Nov 29 2021 1:46PM\par ***********************************************\par EXAM: NM PULM VENTILATION PERFUS IMG\par PROCEDURE DATE: 11/26/2021\par INTERPRETATION: CLINICAL INFORMATION: 56 year-old male with chest pain, renal carcinoma,, referred to evaluate for pulmonary embolism.\par RADIOPHARMACEUTICAL: 1 mCi Tc-99m-DTPA by inhalation; 6 mCi Tc-99m-MAA, I.V.\par TECHNIQUE: Ventilation and perfusion images of the lungs were obtained following administration of Tc-99m-DTPA and Tc-99m-MAA. Images were obtained in the anterior, posterior, both lateral, and all 4 oblique projections. This study was interpreted in conjunction with a chest radiograph of 11/26/2021\par COMPARISON: No previous lung V/Q scan for comparison\par \par FINDINGS: There is heterogeneous radiotracer distribution in the lungs on both the ventilation and the perfusion images. There is small matched subsegmental perfusion defect in the right apex. No segmental mismatched V/Q defects are identified.\par IMPRESSION: . Very low probability of pulmonary embolus.\par --- End of Report ---\par ILEANA NICE MD; Attending Nuclear Medicine\par This document has been electronically signed. Nov 26 2021 9:44AM\par ****************************************************\par EXAM: CT ABDOMEN AND PELVIS\par EXAM: CT CHEST\par PROCEDURE DATE: 11/25/2021\par INTERPRETATION: CLINICAL INFORMATION: Renal cancer\par COMPARISON: CT abdomen pelvis 11/1/2021 [address] chest 12/7/2015\par CONTRAST/COMPLICATIONS:\par IV Contrast: None\par Oral Contrast: None\par Complications: None\par PROCEDURE:\par CT of the Chest, Abdomen and Pelvis was performed.\par Sagittal and coronal reformats were performed.\par FINDINGS:\par CHEST:\par LUNGS AND LARGE AIRWAYS: Patent central airways. Biapical bullous disease. Paraseptal emphysema, slightly progressed since prior study. A new 2.0 x 1.9 cm juxtapleural nodule in the left lower lobe. Smaller left lower lobe nodules measure 1.2 x 0.8 cm and 1.0 x 0.8 cm..\par PLEURA: No pleural effusion.\par VESSELS: Within normal limits.\par HEART: Heart size is normal. No pericardial effusion.\par MEDIASTINUM AND ALLEN: No lymphadenopathy.\par CHEST WALL AND LOWER NECK: Within normal limits.\par \par ABDOMEN AND PELVIS:\par LIVER: Within normal limits.\par BILE DUCTS: Normal caliber.\par GALLBLADDER: Within normal limits.\par SPLEEN: Within normal limits.\par PANCREAS: Coarse pancreatic head calcifications compatible chronic pancreatitis change.\par ADRENALS: Nodularity of the left inferior adrenal gland is unchanged. Right adrenal gland is within normal limits..\par KIDNEYS/URETERS:\par Right kidney: Status post right nephrectomy with multiple lobulated soft tissue lesions in the right renal fossa, the confluence of which measures 4.1 x 2.5 cm, stable.\par Left kidney: Stable left anterior renal mass measures 7.3 x 7.2 cm. At the same level posteriorly there is a stable mass measuring 4.6 x 4.0 cm. Indeterminant partially calcified lobulated left upper pole exophytic lesion measures 3.5 x 2.3 cm. Stable left lower pole exophytic masses measure 4.8 x 4.1 cm and 3.5 x 3.1 cm. Staghorn calculus in the left kidney is stable. No hydronephrosis.\par \par BLADDER: Decompressed urinary bladder with stable circumferential wall thickening and perivesicular infiltration.\par REPRODUCTIVE ORGANS: Mildly enlarged prostate gland.\par \par BOWEL: No bowel obstruction. Diverticulosis coli.\par PERITONEUM: Stable omental nodules. Reference lesions measure 2.2 x 1.8 cm in the left upper quadrant (2:187 and 4.1 x 1.8 cm in the upper abdominal omentum (2:170) No ascites.\par VESSELS: Infrarenal IVC filter. Heterogeneous expansion of the IVC and bilateral iliofemoral veins compatible with DVT.\par RETROPERITONEUM/LYMPH NODES: Stable ashlyn mass anterior to the IVC measures 4.6 x 3.9 cm. Right retroperitoneal lymph node mass measures 3.5 x 2.1 cm, anterior to the right psoas muscle and is stable. Right lower quadrant mass measures 4.8 x 3.4 cm, stable.\par ABDOMINAL WALL: Left thigh edema secondary to extensive DVT..\par BONES: Degenerative changes.\par \par IMPRESSION:\par Multiple stable left renal masses compatible with a synchronous recurrent renal cell carcinoma. Status post right nephrectomy without evidence of recurrence in the right renal fossa.\par Stable retroperitoneal lymphadenopathy and stable peritoneal carcinomatosis.\par Extensive DVT involving the infrarenal IVC and bilateral iliofemoral veins.\par Multiple left lower lobe pulmonary nodules compatible with metastases.\par --- End of Report ---\par LLOYD POWER MD; Attending Radiologist\par This document has been electronically signed. Nov 25 2021 3:57PM\par **************************************************************\par EXAM: US DPLX LWR EXT VEINS COMPL BI\par PROCEDURE DATE: 11/24/2021\par INTERPRETATION: CLINICAL INFORMATION: Leg swelling\par COMPARISON: None available.\par TECHNIQUE: Duplex sonography of the BILATERAL LOWER extremity veins with color and spectral Doppler, with and without compression.\par FINDINGS:\par \par RIGHT:\par There is deep venous thrombosis within the common femoral and popliteal veins.\par Normal compressibility of the RIGHT femoral vein.\par RIGHT calf vein thrombosis is detected in the posterior tibial and peroneal veins.\par \par LEFT:\par There is deep venous thrombosis within the common femoral, femoral, and popliteal veins.\par LEFT calf vein thrombosis is detected in the posterior tibial and peroneal veins.\par \par IMPRESSION:\par Positive for extensive deep venous thrombosis in the bilateral lower extremities.\par Findings were discussed with Dr. Ying by telephone on 11/24/2021 at 1555 hours.\par --- End of Report ---\par LAITH HOLM MD; Attending Radiologist\par This document has been electronically signed. Nov 24 2021 3:\par *********************************************************\par EXAM: CT RENAL STONE [facility]\par *** ADDENDUM 11/01/2021 ***\par A second left lower lobe nodule is suspected on image 8 of series 2 also seen on coronal image 55 measuring approximately 8mm.\par --- End of Report ---\par *** END OF ADDENDUM 11/01/2021 ***\par PROCEDURE DATE: 11/01/2021\par INTERPRETATION: CLINICAL INFORMATION: Hematuria and back pain. Renal cell carcinoma status post right nephrectomy\par COMPARISON: 12/7/2016\par CONTRAST/COMPLICATIONS:\par IV Contrast: None\par Oral Contrast: None\par \par PROCEDURE:\par CT of the Abdomen and Pelvis was performed.\par Sagittal and coronal reformats were performed.\par \par FINDINGS:\par LOWER CHEST: There is a pleural-based mass with irregular border in the lateral left lower lobe anteriorly measuring 2.2 x 1.7 cm on image 13 of series 2. This was not seen on the prior study.\par LIVER: Within normal limits.\par BILE DUCTS: Normal caliber.\par GALLBLADDER: Within normal limits.\par SPLEEN: Within normal limits.\par PANCREAS: Multiple pancreatic head calcifications are again noted.\par ADRENALS: Thickened.\par KIDNEYS/URETERS: The right kidney is surgically absent. There are surgical clips in the right renal fossa. There is a new soft tissue mass in the right renal fossa which is lobulated and measures 3.8 x 2.3 cm on image 50 calcifications are seen within this mass. Mass is inseparable from the surgical clips are previously present. The left kidney demonstrates multiple large calculi. There is a heterogeneous exophytic lesion at the lower pole of the kidney measuring 5.7 x 4.8 cm on image 79. Additional exophytic focus is seen posteriorly at the lower pole measuring 3.6 x 2.5 cm on image 65 which is similar to the appearance on the prior study. There is a mass in the anterior midpole which is heterogeneous in attenuation measuring 6.1 x 5.8 cm on image 55. Additional small probable cysts are seen in left kidney. There is additional exophytic upper pole medial lesion with linear calcifications. There is measures 3.0 x 2.3 cm on image 42 BLADDER: Within normal limits.\par REPRODUCTIVE ORGANS: Prostate is enlarged.\par \par BOWEL: No bowel obstruction. Appendix is normal.. There is diverticulosis without diverticulitis\par PERITONEUM: No ascites.\par VESSELS: IVC filter is again noted. There is subtle decreased attenuation in the right common femoral vein which may indicate acute DVT.\par RETROPERITONEUM/LYMPH NODES: There is bulky retroperitoneal adenopathy in the mid abdomen anterior to the IVC measuring 3.9 x 2.3 cm on image 79 of series 2. Adenopathy continues to the level of the bifurcation. Smaller right common iliac lymph nodes are appreciated. There is left common iliac adenopathy as well best seen on image 112\par ABDOMINAL WALL: Within normal limits.\par BONES: Degenerative changes.\par \par IMPRESSION:\par Left lower lobe pleural-based mass. Retroperitoneal and pelvic lymphadenopathy. Mass in the right nephrectomy bed. At least 2 masslike areas in the left kidney. Findings are suspicious for recurrent/metastatic malignancy. Further evaluation with chest CT may be helpful to assess for extent of disease in the chest. This may be done along with dedicated pre and postcontrast imaging of the kidneys for more complete evaluation.\par Questionable DVT in the right common femoral vein in this patient with IVC filter\par Diverticulosis without diverticulitis\par --- End of Report ---\par ***Please see the addendum at the top of this report. It may contain additional important information or changes.****\par JANEEN FREIRE MD; Attending Radiologist\par This document has been electronically signed. Nov 1 2021 7:59PM\par Addend:JANEEN FREIRE MD; Attending Radiologist\par This addendum was electronically signed on: Nov 1 2021 8:34PM\par ***********************************************************************\par EXAM: [id number] - PETCT [facility] ONC FDG INIT - ORDERED BY: JAREK FERREIRA\par PROCEDURE DATE: 03/15/2022\par INTERPRETATION: PROCEDURE: PET/CT SKULL BASE-MID THIGH IMAGING\par CLINICAL INFORMATION: 56-year-old male referred for evaluation of recurrent metastatic kidney cancer status post right nephrectomy. PET/CT is done as part of the initial treatment strategy evaluation.\par \par RADIOPHARMACEUTICAL: 12.07 mCi F-18, FDG, I.V.\par TECHNIQUE: Fasting blood sugar measured prior to injection of radiopharmaceutical was 88 mg/dl. Following intravenous injection of the radiopharmaceutical and an uptake period of approximately 50 minutes, FDG-PET/CT was obtained on a [employer] 710 from skull base to mid thigh. Oral contrast was given during the uptake period. CT was performed during shallow respiration. The CT protocol was optimized for PET attenuation correction and anatomic localization. The CT protocol was not designed to produce and cannot replace state-of-the-art diagnostic CT images with specific imaging protocols for different body parts and indications. Images were reviewed on a dedicated workstation using multiplanar reconstruction.\par \par The standardized uptake values (SUV) are normalized to patient body weight and indicate the highest activity concentration (SUVmax) in a given disease site. All image numbers refer to axial image number.\par COMPARISON: No prior PET/CT is available.\par OTHER STUDIES USED FOR CORRELATION: CT head dated 11/29/[address] chest, abdomen, and pelvis dated 11/25/2021.\par \par FINDINGS:\par HEAD/NECK: Physiologic FDG activity in visualized brain, head, and neck.\par \par CHEST: There is an enlarged FDG-avid subcarinal lymph node which is increased in size as compared to CT from 11/25/2021, measuring 3.6 x 2.3 cm, SUV 5.5 (99); previously 2.4 x 1.7 cm. There is a difficult to delineate FDG-avid left perihilar lymph node which also is increased in size, measuring approximately 1.8 x 1.7 cm, SUV 4.4 (image 100); previously 1.8 x 1 0 cm.\par \par LUNGS: There are 3 mildly FDG-avid left lower lobe pulmonary nodules which are similar or increased in size as compared to CT dated 11/25/2021. For reference, the largest, most FDG-avid nodule is located in the peripheral left lower lobe measuring 2.0 x 1.9 cm, SUV 5.7 (image 117); previously 2.0 x 1.9 cm. Another left lower lobe nodule measures 1.4 x 1.3 cm, SUV 1.9 (image 112); previously 1.0 x 0.8 cm. A more superiorly located left lower lobe nodule measures 1.2 x 1.1 cm, SUV 1.1 (image 100); previously 0.8 x 1.0 cm. Biapical bullous disease and paraseptal emphysema, unchanged.\par \par PLEURA/PERICARDIUM: No abnormal FDG activity. No effusion.\par \par HEPATOBILIARY/PANCREAS: Physiologic FDG activity. Liver SUV mean is 3.0.\par Coarse pancreatic head calcifications compatible chronic pancreatitis change.\par \par SPLEEN: Physiologic FDG activity. Normal in size.\par \par ADRENAL GLANDS: No abnormal FDG activity. Nodularity of the left inferior adrenal gland is unchanged. Right adrenal gland is within normal limits.\par \par KIDNEYS/URINARY BLADDER: Status post right nephrectomy. Lobulated soft tissue in right renal bed is unchanged in size and demonstrates increased FDG activity, measuring 4.2 x 2.2 cm, SUV 5.1 (image 156). There are 4 FDG-avid left renal masses which are not significantly changed in size as compared to CT dated 11/25/2021. The largest lesion, located in the anterior aspect of the left kidney measures 7.3 x 7.2 cm and demonstrates peripheral FDG activity and central photopenia (SUV 5.0; image 162). In the posterior aspect of the left kidney at the same level, a 4.6 x 4.0 cm mass demonstrates SUV 5.7 (image 156). A partially calcified, lobulated left upper pole exophytic lesion measuring 3.5 x 2.3 cm demonstrates SUV 5.9 (image 144). Left lower pole exophytic mass measuring 4.8 x 4.1 cm demonstrates SUV 3.9 (image 176). Staghorn calculus in left kidney, unchanged. No hydronephrosis.\par \par REPRODUCTIVE ORGANS: No abnormal FDG activity.\par ABDOMINOPELVIC NODES: No enlarged or FDG-avid lymph node.\par \par BOWEL/PERITONEUM/MESENTERY: Multiple FDG-avid omental nodules and masses are similar, or mildly increased in size. For reference, a mass in the anterior left mid abdomen measures 7.0 x 4.8 cm, SUV 6.7 (image 167); previously 6.7 x 5.2 cm.\par \par VESSELS: Infrarenal IVC filter. Heterogeneous expansion of the IVC and bilateral iliofemoral veins compatible with DVT.\par \par RETROPERITONEUM/LYMPH NODES: FDG-avid retroperitoneal lymphadenopathy is not significantly changed in size. Reference lesion located anterior to right cell has muscle measures 3.6 x 2.0 cm, SUV 5.8 (image 191).\par BONES: Physiologic FDG activity.\par \par IMPRESSION: Abnormal FDG-PET/CT scan.\par \par 1. Multiple FDG-avid left renal masses are compatible with renal cell carcinomas. FDG-avid lobulated soft tissue in right renal bed is compatible with recurrent disease.\par \par 2. FDG-avid subcarinal, left perihilar and retroperitoneal lymphadenopathy is compatible with metastatic disease. Thoracic lymphadenopathy is increased in size since 11/25/2021, and retroperitoneal lymphadenopathy is not significantly changed.\par \par 3. Multiple FDG-avid omental nodules and masses, similar, or mildly increased in size, compatible with metastatic disease.\par \par 4. FDG-avid left lower lobe pulmonary nodules, similar, or mildly increased in size are compatible with metastatic disease.\par \par Findings emailed to Dr. Jarek Ferreira.\par --- End of Report ---\par RYAN MERINO MD; Attending Nuclear Medicine\par This document has been electronically signed. Mar 16 2022 11:21AM\par ************************************************************************

## 2022-04-07 ENCOUNTER — NON-APPOINTMENT (OUTPATIENT)
Age: 57
End: 2022-04-07

## 2022-04-13 RX ORDER — ONDANSETRON 8 MG/1
8 TABLET ORAL
Qty: 30 | Refills: 0 | Status: ACTIVE | COMMUNITY
Start: 2022-04-13 | End: 1900-01-01

## 2022-04-14 ENCOUNTER — OUTPATIENT (OUTPATIENT)
Dept: OUTPATIENT SERVICES | Facility: HOSPITAL | Age: 57
LOS: 1 days | Discharge: HOME | End: 2022-04-14

## 2022-04-14 DIAGNOSIS — C64.9 MALIGNANT NEOPLASM OF UNSPECIFIED KIDNEY, EXCEPT RENAL PELVIS: ICD-10-CM

## 2022-04-14 DIAGNOSIS — Z90.5 ACQUIRED ABSENCE OF KIDNEY: Chronic | ICD-10-CM

## 2022-04-14 DIAGNOSIS — Z98.89 OTHER SPECIFIED POSTPROCEDURAL STATES: Chronic | ICD-10-CM

## 2022-04-19 ENCOUNTER — RESULT REVIEW (OUTPATIENT)
Age: 57
End: 2022-04-19

## 2022-04-19 ENCOUNTER — APPOINTMENT (OUTPATIENT)
Dept: INFUSION THERAPY | Facility: HOSPITAL | Age: 57
End: 2022-04-19

## 2022-04-19 LAB
BASOPHILS # BLD AUTO: 0.04 K/UL — SIGNIFICANT CHANGE UP (ref 0–0.2)
BASOPHILS NFR BLD AUTO: 0.7 % — SIGNIFICANT CHANGE UP (ref 0–2)
EOSINOPHIL # BLD AUTO: 0.19 K/UL — SIGNIFICANT CHANGE UP (ref 0–0.5)
EOSINOPHIL NFR BLD AUTO: 3.2 % — SIGNIFICANT CHANGE UP (ref 0–6)
HCT VFR BLD CALC: 38.9 % — LOW (ref 39–50)
HGB BLD-MCNC: 12.2 G/DL — LOW (ref 13–17)
IMM GRANULOCYTES NFR BLD AUTO: 0.2 % — SIGNIFICANT CHANGE UP (ref 0–1.5)
LYMPHOCYTES # BLD AUTO: 1.28 K/UL — SIGNIFICANT CHANGE UP (ref 1–3.3)
LYMPHOCYTES # BLD AUTO: 21.4 % — SIGNIFICANT CHANGE UP (ref 13–44)
MCHC RBC-ENTMCNC: 31.2 PG — SIGNIFICANT CHANGE UP (ref 27–34)
MCHC RBC-ENTMCNC: 31.4 G/DL — LOW (ref 32–36)
MCV RBC AUTO: 99.5 FL — SIGNIFICANT CHANGE UP (ref 80–100)
MONOCYTES # BLD AUTO: 0.61 K/UL — SIGNIFICANT CHANGE UP (ref 0–0.9)
MONOCYTES NFR BLD AUTO: 10.2 % — SIGNIFICANT CHANGE UP (ref 2–14)
NEUTROPHILS # BLD AUTO: 3.84 K/UL — SIGNIFICANT CHANGE UP (ref 1.8–7.4)
NEUTROPHILS NFR BLD AUTO: 64.3 % — SIGNIFICANT CHANGE UP (ref 43–77)
NRBC # BLD: 0 /100 WBCS — SIGNIFICANT CHANGE UP (ref 0–0)
PLATELET # BLD AUTO: 253 K/UL — SIGNIFICANT CHANGE UP (ref 150–400)
RBC # BLD: 3.91 M/UL — LOW (ref 4.2–5.8)
RBC # FLD: 15.6 % — HIGH (ref 10.3–14.5)
WBC # BLD: 5.97 K/UL — SIGNIFICANT CHANGE UP (ref 3.8–10.5)
WBC # FLD AUTO: 5.97 K/UL — SIGNIFICANT CHANGE UP (ref 3.8–10.5)

## 2022-04-20 ENCOUNTER — NON-APPOINTMENT (OUTPATIENT)
Age: 57
End: 2022-04-20

## 2022-04-20 DIAGNOSIS — Z51.11 ENCOUNTER FOR ANTINEOPLASTIC CHEMOTHERAPY: ICD-10-CM

## 2022-04-20 DIAGNOSIS — R11.2 NAUSEA WITH VOMITING, UNSPECIFIED: ICD-10-CM

## 2022-04-20 LAB
ALBUMIN SERPL ELPH-MCNC: 4.1 G/DL — SIGNIFICANT CHANGE UP (ref 3.3–5)
ALP SERPL-CCNC: 78 U/L — SIGNIFICANT CHANGE UP (ref 40–120)
ALT FLD-CCNC: 10 U/L — SIGNIFICANT CHANGE UP (ref 10–45)
ANION GAP SERPL CALC-SCNC: 16 MMOL/L — SIGNIFICANT CHANGE UP (ref 5–17)
AST SERPL-CCNC: 14 U/L — SIGNIFICANT CHANGE UP (ref 10–40)
BILIRUB SERPL-MCNC: 0.3 MG/DL — SIGNIFICANT CHANGE UP (ref 0.2–1.2)
BUN SERPL-MCNC: 30 MG/DL — HIGH (ref 7–23)
CALCIUM SERPL-MCNC: 8.8 MG/DL — SIGNIFICANT CHANGE UP (ref 8.4–10.5)
CHLORIDE SERPL-SCNC: 111 MMOL/L — HIGH (ref 96–108)
CO2 SERPL-SCNC: 18 MMOL/L — LOW (ref 22–31)
CREAT SERPL-MCNC: 2.89 MG/DL — HIGH (ref 0.5–1.3)
EGFR: 25 ML/MIN/1.73M2 — LOW
GLUCOSE SERPL-MCNC: 78 MG/DL — SIGNIFICANT CHANGE UP (ref 70–99)
POTASSIUM SERPL-MCNC: 5.2 MMOL/L — SIGNIFICANT CHANGE UP (ref 3.5–5.3)
POTASSIUM SERPL-SCNC: 5.2 MMOL/L — SIGNIFICANT CHANGE UP (ref 3.5–5.3)
PROT SERPL-MCNC: 6.8 G/DL — SIGNIFICANT CHANGE UP (ref 6–8.3)
SODIUM SERPL-SCNC: 145 MMOL/L — SIGNIFICANT CHANGE UP (ref 135–145)
T4 FREE+ TSH PNL SERPL: 1.28 UIU/ML — SIGNIFICANT CHANGE UP (ref 0.27–4.2)

## 2022-04-21 ENCOUNTER — NON-APPOINTMENT (OUTPATIENT)
Age: 57
End: 2022-04-21

## 2022-04-25 ENCOUNTER — NON-APPOINTMENT (OUTPATIENT)
Age: 57
End: 2022-04-25

## 2022-04-27 ENCOUNTER — NON-APPOINTMENT (OUTPATIENT)
Age: 57
End: 2022-04-27

## 2022-05-12 ENCOUNTER — OUTPATIENT (OUTPATIENT)
Dept: OUTPATIENT SERVICES | Facility: HOSPITAL | Age: 57
LOS: 1 days | Discharge: HOME | End: 2022-05-12

## 2022-05-12 DIAGNOSIS — Z98.89 OTHER SPECIFIED POSTPROCEDURAL STATES: Chronic | ICD-10-CM

## 2022-05-12 DIAGNOSIS — C64.9 MALIGNANT NEOPLASM OF UNSPECIFIED KIDNEY, EXCEPT RENAL PELVIS: ICD-10-CM

## 2022-05-12 DIAGNOSIS — Z90.5 ACQUIRED ABSENCE OF KIDNEY: Chronic | ICD-10-CM

## 2022-05-12 DIAGNOSIS — C78.6 SECONDARY MALIGNANT NEOPLASM OF RETROPERITONEUM AND PERITONEUM: ICD-10-CM

## 2022-05-17 ENCOUNTER — RESULT REVIEW (OUTPATIENT)
Age: 57
End: 2022-05-17

## 2022-05-17 ENCOUNTER — LABORATORY RESULT (OUTPATIENT)
Age: 57
End: 2022-05-17

## 2022-05-17 ENCOUNTER — APPOINTMENT (OUTPATIENT)
Dept: INFUSION THERAPY | Facility: HOSPITAL | Age: 57
End: 2022-05-17

## 2022-05-17 ENCOUNTER — APPOINTMENT (OUTPATIENT)
Dept: HEMATOLOGY ONCOLOGY | Facility: CLINIC | Age: 57
End: 2022-05-17
Payer: MEDICAID

## 2022-05-17 VITALS
HEART RATE: 79 BPM | DIASTOLIC BLOOD PRESSURE: 90 MMHG | WEIGHT: 255.3 LBS | SYSTOLIC BLOOD PRESSURE: 132 MMHG | OXYGEN SATURATION: 96 % | BODY MASS INDEX: 33.23 KG/M2 | TEMPERATURE: 97.3 F | RESPIRATION RATE: 16 BRPM

## 2022-05-17 LAB
ALBUMIN SERPL ELPH-MCNC: 4.2 G/DL — SIGNIFICANT CHANGE UP (ref 3.3–5)
ALP SERPL-CCNC: 94 U/L — SIGNIFICANT CHANGE UP (ref 40–120)
ALT FLD-CCNC: 16 U/L — SIGNIFICANT CHANGE UP (ref 10–45)
ANION GAP SERPL CALC-SCNC: 15 MMOL/L — SIGNIFICANT CHANGE UP (ref 5–17)
AST SERPL-CCNC: 17 U/L — SIGNIFICANT CHANGE UP (ref 10–40)
BASOPHILS # BLD AUTO: 0.06 K/UL — SIGNIFICANT CHANGE UP (ref 0–0.2)
BASOPHILS NFR BLD AUTO: 0.9 % — SIGNIFICANT CHANGE UP (ref 0–2)
BILIRUB SERPL-MCNC: 0.4 MG/DL — SIGNIFICANT CHANGE UP (ref 0.2–1.2)
BUN SERPL-MCNC: 40 MG/DL — HIGH (ref 7–23)
BUN SERPL-MCNC: 40 MG/DL — HIGH (ref 7–23)
CA-I BLDA-SCNC: 1.19 MMOL/L — SIGNIFICANT CHANGE UP (ref 1.12–1.3)
CALCIUM SERPL-MCNC: 8.4 MG/DL — SIGNIFICANT CHANGE UP (ref 8.4–10.5)
CHLORIDE SERPL-SCNC: 110 MMOL/L — HIGH (ref 96–108)
CHLORIDE SERPL-SCNC: 115 MMOL/L — HIGH (ref 96–108)
CO2 SERPL-SCNC: 15 MMOL/L — LOW (ref 22–31)
CO2 SERPL-SCNC: 18 MMOL/L — LOW (ref 22–31)
CREAT SERPL-MCNC: 3.17 MG/DL — HIGH (ref 0.5–1.3)
CREAT SERPL-MCNC: 3.3 MG/DL — HIGH (ref 0.5–1.3)
EGFR: 22 ML/MIN/1.73M2 — LOW
EOSINOPHIL # BLD AUTO: 0.17 K/UL — SIGNIFICANT CHANGE UP (ref 0–0.5)
EOSINOPHIL NFR BLD AUTO: 2.4 % — SIGNIFICANT CHANGE UP (ref 0–6)
GLUCOSE SERPL-MCNC: 86 MG/DL — SIGNIFICANT CHANGE UP (ref 70–99)
GLUCOSE SERPL-MCNC: 94 MG/DL — SIGNIFICANT CHANGE UP (ref 70–99)
HCT VFR BLD CALC: 41 % — SIGNIFICANT CHANGE UP (ref 39–50)
HGB BLD-MCNC: 12.7 G/DL — LOW (ref 13–17)
IMM GRANULOCYTES NFR BLD AUTO: 0.3 % — SIGNIFICANT CHANGE UP (ref 0–1.5)
LDH SERPL L TO P-CCNC: 271 U/L — HIGH (ref 50–242)
LYMPHOCYTES # BLD AUTO: 1.46 K/UL — SIGNIFICANT CHANGE UP (ref 1–3.3)
LYMPHOCYTES # BLD AUTO: 21 % — SIGNIFICANT CHANGE UP (ref 13–44)
MCHC RBC-ENTMCNC: 30.8 PG — SIGNIFICANT CHANGE UP (ref 27–34)
MCHC RBC-ENTMCNC: 31 G/DL — LOW (ref 32–36)
MCV RBC AUTO: 99.3 FL — SIGNIFICANT CHANGE UP (ref 80–100)
MONOCYTES # BLD AUTO: 0.45 K/UL — SIGNIFICANT CHANGE UP (ref 0–0.9)
MONOCYTES NFR BLD AUTO: 6.5 % — SIGNIFICANT CHANGE UP (ref 2–14)
NEUTROPHILS # BLD AUTO: 4.8 K/UL — SIGNIFICANT CHANGE UP (ref 1.8–7.4)
NEUTROPHILS NFR BLD AUTO: 68.9 % — SIGNIFICANT CHANGE UP (ref 43–77)
NRBC # BLD: 0 /100 WBCS — SIGNIFICANT CHANGE UP (ref 0–0)
PLATELET # BLD AUTO: 288 K/UL — SIGNIFICANT CHANGE UP (ref 150–400)
POTASSIUM SERPL-MCNC: 5.5 MMOL/L — HIGH (ref 3.5–5.3)
POTASSIUM SERPL-MCNC: 5.7 MMOL/L — HIGH (ref 3.5–5.3)
POTASSIUM SERPL-SCNC: 5.5 MMOL/L — HIGH (ref 3.5–5.3)
POTASSIUM SERPL-SCNC: 5.7 MMOL/L — HIGH (ref 3.5–5.3)
PROT SERPL-MCNC: 7 G/DL — SIGNIFICANT CHANGE UP (ref 6–8.3)
RBC # BLD: 4.13 M/UL — LOW (ref 4.2–5.8)
RBC # FLD: 15.8 % — HIGH (ref 10.3–14.5)
SODIUM SERPL-SCNC: 141 MMOL/L — SIGNIFICANT CHANGE UP (ref 135–145)
SODIUM SERPL-SCNC: 143 MMOL/L — SIGNIFICANT CHANGE UP (ref 135–145)
T4 FREE SERPL-MCNC: 1.3 NG/DL — SIGNIFICANT CHANGE UP (ref 0.9–1.8)
TSH SERPL-MCNC: 1.33 UIU/ML — SIGNIFICANT CHANGE UP (ref 0.27–4.2)
WBC # BLD: 6.96 K/UL — SIGNIFICANT CHANGE UP (ref 3.8–10.5)
WBC # FLD AUTO: 6.96 K/UL — SIGNIFICANT CHANGE UP (ref 3.8–10.5)

## 2022-05-17 PROCEDURE — 99215 OFFICE O/P EST HI 40 MIN: CPT

## 2022-05-18 DIAGNOSIS — Z51.11 ENCOUNTER FOR ANTINEOPLASTIC CHEMOTHERAPY: ICD-10-CM

## 2022-05-18 DIAGNOSIS — R11.2 NAUSEA WITH VOMITING, UNSPECIFIED: ICD-10-CM

## 2022-05-20 NOTE — PHYSICAL EXAM
[Restricted in physically strenuous activity but ambulatory and able to carry out work of a light or sedentary nature] : Status 1- Restricted in physically strenuous activity but ambulatory and able to carry out work of a light or sedentary nature, e.g., light house work, office work [Normal] : affect appropriate [de-identified] : anicteric [de-identified] : trace BLE edema
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
## 2022-05-20 NOTE — ASSESSMENT
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 Cabozantinib + nivolumab started 4/19/22. \par \par Met RCC:\par - Cabozantinib 40mg daily + nivolumab cycle 2 today. Tolerating well with no significant AEs. Potential side effects of treatment reviewed again today. \par - He had labs done at his PCP's office 2wks ago but we do not have those results. An iSTAT was done today which showed Cr = 3.3 today which is within his baseline. Will proceed with nivolumab today. \par - Plan for repeat scans after 3 cycles. \par \par Tooth infection:\par - Reportedly has an infection of his top front tooth, he was prescribed amoxicillin by his dentist and advised that he needs a tooth extraction. I discussed the r/o improper wound healing while on TKIs and reiterated the need to hold cabozantinib before and after any invasive procedures. \par - I spoke with pt's dentist Dr. French [phone] after this visit. The tooth is severely infected and unlikely to respond to antibiotics alone, he believes the tooth will need to be extracted though it does not have to be done urgently. Pt will have to hold cabozantinib 1wk before and after the extraction. \par \par CKD:\par - Cr = 3.3 on iSTAT today but 3.17 on CMP\par - K = 5.7 on CMP today, he is off Lokelma. I spoke with his nephrologist Dr. Ann who advised that pt should resume Lokelma. Dr. Ann sent an Rx for Lokelma to pt's pharmacy and pt instructed to resume as prescribed. \par - Continue f/u with nephrology.\par \par Instructed to contact our office with any new/worsening symptoms.\par Pt educated regarding plan of care, all questions/concerns addressed to the best of my abilities and his apparent satisfaction.\par F/u in 4wks for provider visit + nivo cycle 3. He will have labs done at his local [facility] 2-3 days before this visit.

## 2022-05-20 NOTE — REVIEW OF SYSTEMS
[Recent Change In Weight] : ~T recent weight change [Fever] : no fever [Chills] : no chills [Night Sweats] : no night sweats [Fatigue] : no fatigue [Eye Pain] : no eye pain [Vision Problems] : no vision problems [Dysphagia] : no dysphagia [Odynophagia] : no odynophagia [Mucosal Pain] : no mucosal pain [Chest Pain] : no chest pain [Palpitations] : no palpitations [Lower Ext Edema] : no lower extremity edema [Shortness Of Breath] : no shortness of breath [Cough] : no cough [Abdominal Pain] : no abdominal pain [Vomiting] : no vomiting [Constipation] : no constipation [Diarrhea] : no diarrhea [Dysuria] : no dysuria [Joint Pain] : no joint pain [Joint Stiffness] : no joint stiffness [Muscle Pain] : no muscle pain [Skin Rash] : no skin rash [Dizziness] : no dizziness [Hot Flashes] : no hot flashes [Muscle Weakness] : no muscle weakness [Easy Bleeding] : no tendency for easy bleeding [Easy Bruising] : no tendency for easy bruising

## 2022-06-07 ENCOUNTER — OUTPATIENT (OUTPATIENT)
Dept: OUTPATIENT SERVICES | Facility: HOSPITAL | Age: 57
LOS: 1 days | Discharge: HOME | End: 2022-06-07

## 2022-06-07 DIAGNOSIS — C64.9 MALIGNANT NEOPLASM OF UNSPECIFIED KIDNEY, EXCEPT RENAL PELVIS: ICD-10-CM

## 2022-06-07 DIAGNOSIS — Z98.89 OTHER SPECIFIED POSTPROCEDURAL STATES: Chronic | ICD-10-CM

## 2022-06-07 DIAGNOSIS — Z90.5 ACQUIRED ABSENCE OF KIDNEY: Chronic | ICD-10-CM

## 2022-06-14 ENCOUNTER — RESULT REVIEW (OUTPATIENT)
Age: 57
End: 2022-06-14

## 2022-06-14 ENCOUNTER — APPOINTMENT (OUTPATIENT)
Dept: HEMATOLOGY ONCOLOGY | Facility: CLINIC | Age: 57
End: 2022-06-14
Payer: MEDICAID

## 2022-06-14 ENCOUNTER — APPOINTMENT (OUTPATIENT)
Dept: INFUSION THERAPY | Facility: HOSPITAL | Age: 57
End: 2022-06-14

## 2022-06-14 ENCOUNTER — LABORATORY RESULT (OUTPATIENT)
Age: 57
End: 2022-06-14

## 2022-06-14 VITALS — SYSTOLIC BLOOD PRESSURE: 133 MMHG | DIASTOLIC BLOOD PRESSURE: 94 MMHG

## 2022-06-14 VITALS
RESPIRATION RATE: 16 BRPM | WEIGHT: 252.87 LBS | BODY MASS INDEX: 32.8 KG/M2 | HEART RATE: 66 BPM | TEMPERATURE: 97.7 F | DIASTOLIC BLOOD PRESSURE: 96 MMHG | OXYGEN SATURATION: 98 % | SYSTOLIC BLOOD PRESSURE: 144 MMHG | HEIGHT: 73.54 IN

## 2022-06-14 PROCEDURE — 99214 OFFICE O/P EST MOD 30 MIN: CPT

## 2022-06-15 DIAGNOSIS — Z51.11 ENCOUNTER FOR ANTINEOPLASTIC CHEMOTHERAPY: ICD-10-CM

## 2022-06-15 DIAGNOSIS — R11.2 NAUSEA WITH VOMITING, UNSPECIFIED: ICD-10-CM

## 2022-06-15 LAB
ALBUMIN SERPL ELPH-MCNC: 4 G/DL — SIGNIFICANT CHANGE UP (ref 3.3–5)
ALP SERPL-CCNC: 111 U/L — SIGNIFICANT CHANGE UP (ref 40–120)
ALT FLD-CCNC: 16 U/L — SIGNIFICANT CHANGE UP (ref 10–45)
ANION GAP SERPL CALC-SCNC: 12 MMOL/L — SIGNIFICANT CHANGE UP (ref 5–17)
AST SERPL-CCNC: 28 U/L — SIGNIFICANT CHANGE UP (ref 10–40)
BILIRUB SERPL-MCNC: 0.6 MG/DL — SIGNIFICANT CHANGE UP (ref 0.2–1.2)
BUN SERPL-MCNC: 36 MG/DL — HIGH (ref 7–23)
CALCIUM SERPL-MCNC: 8.5 MG/DL — SIGNIFICANT CHANGE UP (ref 8.4–10.5)
CHLORIDE SERPL-SCNC: 112 MMOL/L — HIGH (ref 96–108)
CO2 SERPL-SCNC: 16 MMOL/L — LOW (ref 22–31)
CREAT SERPL-MCNC: 3.13 MG/DL — HIGH (ref 0.5–1.3)
EGFR: 22 ML/MIN/1.73M2 — LOW
GLUCOSE SERPL-MCNC: 113 MG/DL — HIGH (ref 70–99)
LDH SERPL L TO P-CCNC: 416 U/L — HIGH (ref 50–242)
POTASSIUM SERPL-MCNC: 5.9 MMOL/L — HIGH (ref 3.5–5.3)
POTASSIUM SERPL-SCNC: 5.9 MMOL/L — HIGH (ref 3.5–5.3)
PROT SERPL-MCNC: 6.6 G/DL — SIGNIFICANT CHANGE UP (ref 6–8.3)
SODIUM SERPL-SCNC: 141 MMOL/L — SIGNIFICANT CHANGE UP (ref 135–145)
T4 FREE SERPL-MCNC: 1.1 NG/DL
TSH SERPL-ACNC: 1.91 UIU/ML

## 2022-06-16 NOTE — PHYSICAL EXAM
[Fully active, able to carry on all pre-disease performance without restriction] : Status 0 - Fully active, able to carry on all pre-disease performance without restriction [Normal] : affect appropriate [de-identified] : anicteric [de-identified] : no edema

## 2022-06-16 NOTE — ASSESSMENT
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 Nivolumab started 4/19/22, cabozantinib added on 5/2/22. \par \par Met RCC:\par - Cabozantinib 40mg daily + nivolumab cycle 3 today. Tolerating well with no significant AEs. Potential side effects of treatment reviewed again today. \par - Start of cabozantinib was delayed d/t medication authorization, he didn't start until 5/2/22. Will repeat scans after 3 full months of cabozantinib, August 2022. He will need PET imaging as his Cr precludes him from CT contrast and he is unable to have MRI imaging d/t a brain aneurysm clip that we suspect is not MRI compatible as it was placed very long ago. \par \par Tooth infection:\par - Reportedly has an infection of his top front tooth, he was prescribed amoxicillin by his dentist and advised that he needs a tooth extraction. I discussed the r/o improper wound healing while on TKIs and reiterated the need to hold cabozantinib before and after any invasive procedures. \par - I previously spoke with pt's dentist Dr. French [phone]. The tooth is severely infected and unlikely to respond to antibiotics alone, he believes the tooth will need to be extracted though it does not have to be done urgently. Pt will have to hold cabozantinib 1wk before and after the extraction. Instructed to hold off until after repeat imaging if possible as holding cabozantinib close to imaging may show false progression on imaging. \par \par CKD:\par - Baseline Cr is 2.89 - 3.46\par - Last Cr was 3.17 on 5/17/22. \par - He did not have labs done prior to today's treatment as previously instructed and the iSTAT machine is not available today. Per d/w Dr. Murillo, proceed nivolumab today and send off a CMP. I reiterated the importance of having labs done prior to each tx given his poor kidney function and the r/o AIN with immunotherapy. \par \par HTN:\par - BP at start of visit was 144/96, asymptomatic. Repeat BP at end of visit was 133/94. \par - He continues on Valsartan and amlodipine for HTN. Monitor closely for now given mildly elevated diastolic despite normal systolic. \par \par Instructed to contact our office with any new/worsening symptoms.\par Pt educated regarding plan of care, all questions/concerns addressed to the best of my abilities and his apparent satisfaction.\par F/u in 4wks for provider visit + nivo cycle 4. He will have labs done at [facility] the Friday before his tx appt.
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
## 2022-07-08 ENCOUNTER — RESULT REVIEW (OUTPATIENT)
Age: 57
End: 2022-07-08

## 2022-07-08 ENCOUNTER — APPOINTMENT (OUTPATIENT)
Dept: HEMATOLOGY ONCOLOGY | Facility: CLINIC | Age: 57
End: 2022-07-08

## 2022-07-08 PROBLEM — E83.51 HYPOCALCEMIA: Status: ACTIVE | Noted: 2022-07-08

## 2022-07-08 LAB
ALBUMIN SERPL ELPH-MCNC: 3.7 G/DL
ALP BLD-CCNC: 87 U/L
ALT SERPL-CCNC: 25 U/L
ANION GAP SERPL CALC-SCNC: 11 MMOL/L
AST SERPL-CCNC: 28 U/L
BASOPHILS # BLD AUTO: 0.02 K/UL — SIGNIFICANT CHANGE UP (ref 0–0.2)
BASOPHILS NFR BLD AUTO: 0.4 % — SIGNIFICANT CHANGE UP (ref 0–2)
BILIRUB SERPL-MCNC: 0.5 MG/DL
BUN SERPL-MCNC: 31 MG/DL
CALCIUM SERPL-MCNC: 7.9 MG/DL
CHLORIDE SERPL-SCNC: 113 MMOL/L
CO2 SERPL-SCNC: 19 MMOL/L
CREAT SERPL-MCNC: 3.04 MG/DL
EGFR: 23 ML/MIN/1.73M2
EOSINOPHIL # BLD AUTO: 0.11 K/UL — SIGNIFICANT CHANGE UP (ref 0–0.5)
EOSINOPHIL NFR BLD AUTO: 2.2 % — SIGNIFICANT CHANGE UP (ref 0–6)
GLUCOSE SERPL-MCNC: 130 MG/DL
HCT VFR BLD CALC: 41.7 % — SIGNIFICANT CHANGE UP (ref 39–50)
HGB BLD-MCNC: 13.2 G/DL — SIGNIFICANT CHANGE UP (ref 13–17)
IMM GRANULOCYTES NFR BLD AUTO: 0.4 % — SIGNIFICANT CHANGE UP (ref 0–1.5)
LDH SERPL-CCNC: 299 U/L
LYMPHOCYTES # BLD AUTO: 1.12 K/UL — SIGNIFICANT CHANGE UP (ref 1–3.3)
LYMPHOCYTES # BLD AUTO: 22.7 % — SIGNIFICANT CHANGE UP (ref 13–44)
MCHC RBC-ENTMCNC: 31.7 G/DL — LOW (ref 32–36)
MCHC RBC-ENTMCNC: 32.4 PG — SIGNIFICANT CHANGE UP (ref 27–34)
MCV RBC AUTO: 102.5 FL — HIGH (ref 80–100)
MONOCYTES # BLD AUTO: 0.33 K/UL — SIGNIFICANT CHANGE UP (ref 0–0.9)
MONOCYTES NFR BLD AUTO: 6.7 % — SIGNIFICANT CHANGE UP (ref 2–14)
NEUTROPHILS # BLD AUTO: 3.33 K/UL — SIGNIFICANT CHANGE UP (ref 1.8–7.4)
NEUTROPHILS NFR BLD AUTO: 67.6 % — SIGNIFICANT CHANGE UP (ref 43–77)
NRBC # BLD: 0 /100 WBCS — SIGNIFICANT CHANGE UP (ref 0–0)
PLATELET # BLD AUTO: 217 K/UL — SIGNIFICANT CHANGE UP (ref 150–400)
POTASSIUM SERPL-SCNC: 4.9 MMOL/L
PROT SERPL-MCNC: 6.6 G/DL
RBC # BLD: 4.07 M/UL — LOW (ref 4.2–5.8)
RBC # FLD: 19 % — HIGH (ref 10.3–14.5)
SODIUM SERPL-SCNC: 143 MMOL/L
T4 FREE SERPL-MCNC: 1.2 NG/DL
TSH SERPL-ACNC: 2.03 UIU/ML
WBC # BLD: 4.93 K/UL — SIGNIFICANT CHANGE UP (ref 3.8–10.5)
WBC # FLD AUTO: 4.93 K/UL — SIGNIFICANT CHANGE UP (ref 3.8–10.5)

## 2022-07-08 RX ORDER — PHENOL 1.4 %
600 AEROSOL, SPRAY (ML) MUCOUS MEMBRANE
Qty: 60 | Refills: 1 | Status: ACTIVE | COMMUNITY
Start: 2022-07-08

## 2022-07-12 ENCOUNTER — APPOINTMENT (OUTPATIENT)
Dept: HEMATOLOGY ONCOLOGY | Facility: CLINIC | Age: 57
End: 2022-07-12

## 2022-07-12 ENCOUNTER — APPOINTMENT (OUTPATIENT)
Dept: INFUSION THERAPY | Facility: HOSPITAL | Age: 57
End: 2022-07-12

## 2022-07-12 VITALS
SYSTOLIC BLOOD PRESSURE: 154 MMHG | DIASTOLIC BLOOD PRESSURE: 100 MMHG | TEMPERATURE: 97.7 F | RESPIRATION RATE: 16 BRPM | OXYGEN SATURATION: 98 % | BODY MASS INDEX: 33.25 KG/M2 | HEART RATE: 65 BPM | WEIGHT: 255.71 LBS

## 2022-07-12 DIAGNOSIS — E83.51 HYPOCALCEMIA: ICD-10-CM

## 2022-07-12 PROCEDURE — 99214 OFFICE O/P EST MOD 30 MIN: CPT

## 2022-07-12 RX ORDER — AMOXICILLIN 500 MG/1
500 CAPSULE ORAL
Refills: 0 | Status: DISCONTINUED | COMMUNITY
Start: 2022-05-17 | End: 2022-07-12

## 2022-07-13 NOTE — PHYSICAL EXAM
[Fully active, able to carry on all pre-disease performance without restriction] : Status 0 - Fully active, able to carry on all pre-disease performance without restriction [Normal] : affect appropriate [de-identified] : anicteric  [de-identified] : no edema

## 2022-07-13 NOTE — ASSESSMENT
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 Nivolumab started 4/19/22, cabozantinib added on 5/2/22. \par \par Met RCC:\par - Cabozantinib 40mg daily + nivolumab cycle 4 today. Tolerating well with exception of HTN as below. Potential side effects of treatment reviewed again today. \par - Start of cabozantinib was delayed d/t medication authorization, he didn't start until 5/2/22. Will repeat scans after 3 full months of cabozantinib, beginning of August 2022. He will need PET imaging as his Cr precludes him from CT contrast and he is unable to have MRI imaging d/t a brain aneurysm clip that we suspect is not MRI compatible as it was placed very long ago. \par \par Tooth infection:\par - Reportedly has an infection of his top front tooth, he was prescribed amoxicillin by his dentist and advised that he needs a tooth extraction. I discussed the r/o improper wound healing while on TKIs and reiterated the need to hold cabozantinib before and after any invasive procedures. \par - I previously spoke with pt's dentist Dr. French [phone]. The tooth is severely infected and unlikely to respond to antibiotics alone, he believes the tooth will need to be extracted though it does not have to be done urgently. Pt will have to hold cabozantinib 1wk before and after the extraction. Instructed to hold off until after repeat imaging if possible as holding cabozantinib close to imaging may show false progression on imaging. \par - No tooth pain at present, will continue to monitor closely for now. \par \par CKD:\par - Baseline Cr is 2.89 - 3.46\par - 7/8/22 Cr = 3.04, stable\par \par HTN:\par - BP is 154/100 today, asymptomatic. \par - Increase amlodipine to 10mg daily. Continue Valsartan and hydralazine as prescribed. \par - He has a f/u appt with his PCP tomorrow. His wife has a BP cuff at home, instructed to monitor his BP twice daily and contact the office if SBP >160 and/or DBP >90. \par \par Hypocalcemia:\par - 7/8/22 Ca = 7.9, he started OTC calcium 500 or 600mg bid. \par - Continue to monitor \par \par Instructed to contact our office with any new/worsening symptoms.\par Pt educated regarding plan of care, all questions/concerns addressed to the best of my abilities and his apparent satisfaction.\par F/u in 4wks for provider visit + nivo cycle 5. He will have labs done at [facility] the Friday before his tx appt.

## 2022-07-13 NOTE — REVIEW OF SYSTEMS
[Fever] : no fever [Chills] : no chills [Night Sweats] : no night sweats [Fatigue] : no fatigue [Recent Change In Weight] : ~T no recent weight change [Eye Pain] : no eye pain [Vision Problems] : no vision problems [Dysphagia] : no dysphagia [Nosebleeds] : no nosebleeds [Odynophagia] : no odynophagia [Mucosal Pain] : no mucosal pain [Chest Pain] : no chest pain [Palpitations] : no palpitations [Lower Ext Edema] : no lower extremity edema [Shortness Of Breath] : no shortness of breath [Cough] : no cough [Abdominal Pain] : no abdominal pain [Vomiting] : no vomiting [Constipation] : no constipation [Diarrhea] : no diarrhea [Dysuria] : no dysuria [Incontinence] : no incontinence [Joint Pain] : no joint pain [Joint Stiffness] : no joint stiffness [Muscle Pain] : no muscle pain [Skin Rash] : no skin rash [Dizziness] : no dizziness [Hot Flashes] : no hot flashes [Muscle Weakness] : no muscle weakness [Easy Bleeding] : no tendency for easy bleeding [Easy Bruising] : no tendency for easy bruising
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
## 2022-08-05 ENCOUNTER — APPOINTMENT (OUTPATIENT)
Dept: HEMATOLOGY ONCOLOGY | Facility: CLINIC | Age: 57
End: 2022-08-05

## 2022-08-05 PROBLEM — I82.409: Status: ACTIVE | Noted: 2021-12-06

## 2022-08-08 ENCOUNTER — OUTPATIENT (OUTPATIENT)
Dept: OUTPATIENT SERVICES | Facility: HOSPITAL | Age: 57
LOS: 1 days | Discharge: HOME | End: 2022-08-08

## 2022-08-08 DIAGNOSIS — Z98.89 OTHER SPECIFIED POSTPROCEDURAL STATES: Chronic | ICD-10-CM

## 2022-08-08 DIAGNOSIS — C64.9 MALIGNANT NEOPLASM OF UNSPECIFIED KIDNEY, EXCEPT RENAL PELVIS: ICD-10-CM

## 2022-08-08 DIAGNOSIS — Z90.5 ACQUIRED ABSENCE OF KIDNEY: Chronic | ICD-10-CM

## 2022-08-08 NOTE — DISCHARGE NOTE NURSING/CASE MANAGEMENT/SOCIAL WORK - HISTORY OF COVID-19 VACCINATION
Spoke with Rogerio Heredia ( Yes

## 2022-08-09 ENCOUNTER — APPOINTMENT (OUTPATIENT)
Dept: HEMATOLOGY ONCOLOGY | Facility: CLINIC | Age: 57
End: 2022-08-09

## 2022-08-09 ENCOUNTER — APPOINTMENT (OUTPATIENT)
Dept: INFUSION THERAPY | Facility: HOSPITAL | Age: 57
End: 2022-08-09

## 2022-08-09 DIAGNOSIS — I82.409 ACUTE EMBOLISM AND THROMBOSIS OF UNSPECIFIED DEEP VEINS OF UNSPECIFIED LOWER EXTREMITY: ICD-10-CM

## 2022-08-10 ENCOUNTER — RESULT REVIEW (OUTPATIENT)
Age: 57
End: 2022-08-10

## 2022-08-10 ENCOUNTER — APPOINTMENT (OUTPATIENT)
Dept: HEMATOLOGY ONCOLOGY | Facility: CLINIC | Age: 57
End: 2022-08-10

## 2022-08-10 ENCOUNTER — APPOINTMENT (OUTPATIENT)
Dept: INFUSION THERAPY | Facility: HOSPITAL | Age: 57
End: 2022-08-10

## 2022-08-10 VITALS
HEART RATE: 68 BPM | SYSTOLIC BLOOD PRESSURE: 152 MMHG | RESPIRATION RATE: 16 BRPM | BODY MASS INDEX: 33.42 KG/M2 | DIASTOLIC BLOOD PRESSURE: 94 MMHG | OXYGEN SATURATION: 97 % | WEIGHT: 257.04 LBS | TEMPERATURE: 97.6 F

## 2022-08-10 LAB
BASOPHILS # BLD AUTO: 0.03 K/UL — SIGNIFICANT CHANGE UP (ref 0–0.2)
BASOPHILS NFR BLD AUTO: 0.6 % — SIGNIFICANT CHANGE UP (ref 0–2)
EOSINOPHIL # BLD AUTO: 0.14 K/UL — SIGNIFICANT CHANGE UP (ref 0–0.5)
EOSINOPHIL NFR BLD AUTO: 2.9 % — SIGNIFICANT CHANGE UP (ref 0–6)
HCT VFR BLD CALC: 41 % — SIGNIFICANT CHANGE UP (ref 39–50)
HGB BLD-MCNC: 13.1 G/DL — SIGNIFICANT CHANGE UP (ref 13–17)
IMM GRANULOCYTES NFR BLD AUTO: 1 % — SIGNIFICANT CHANGE UP (ref 0–1.5)
LYMPHOCYTES # BLD AUTO: 1.49 K/UL — SIGNIFICANT CHANGE UP (ref 1–3.3)
LYMPHOCYTES # BLD AUTO: 30.8 % — SIGNIFICANT CHANGE UP (ref 13–44)
MCHC RBC-ENTMCNC: 32 G/DL — SIGNIFICANT CHANGE UP (ref 32–36)
MCHC RBC-ENTMCNC: 32.9 PG — SIGNIFICANT CHANGE UP (ref 27–34)
MCV RBC AUTO: 103 FL — HIGH (ref 80–100)
MONOCYTES # BLD AUTO: 0.41 K/UL — SIGNIFICANT CHANGE UP (ref 0–0.9)
MONOCYTES NFR BLD AUTO: 8.5 % — SIGNIFICANT CHANGE UP (ref 2–14)
NEUTROPHILS # BLD AUTO: 2.72 K/UL — SIGNIFICANT CHANGE UP (ref 1.8–7.4)
NEUTROPHILS NFR BLD AUTO: 56.2 % — SIGNIFICANT CHANGE UP (ref 43–77)
NRBC # BLD: 0 /100 WBCS — SIGNIFICANT CHANGE UP (ref 0–0)
PLATELET # BLD AUTO: 228 K/UL — SIGNIFICANT CHANGE UP (ref 150–400)
RBC # BLD: 3.98 M/UL — LOW (ref 4.2–5.8)
RBC # FLD: 18.4 % — HIGH (ref 10.3–14.5)
T4 FREE SERPL-MCNC: 1.1 NG/DL — SIGNIFICANT CHANGE UP (ref 0.9–1.8)
TSH SERPL-MCNC: 5.32 UIU/ML — HIGH (ref 0.27–4.2)
WBC # BLD: 4.84 K/UL — SIGNIFICANT CHANGE UP (ref 3.8–10.5)
WBC # FLD AUTO: 4.84 K/UL — SIGNIFICANT CHANGE UP (ref 3.8–10.5)

## 2022-08-10 PROCEDURE — 99214 OFFICE O/P EST MOD 30 MIN: CPT

## 2022-08-10 RX ORDER — IBUPROFEN 600 MG/1
600 TABLET, FILM COATED ORAL
Qty: 20 | Refills: 0 | Status: DISCONTINUED | COMMUNITY
Start: 2022-05-12

## 2022-08-10 RX ORDER — AMLODIPINE BESYLATE 5 MG/1
5 TABLET ORAL
Qty: 90 | Refills: 0 | Status: DISCONTINUED | COMMUNITY
Start: 2022-01-03

## 2022-08-10 RX ORDER — HYDROCHLOROTHIAZIDE 25 MG/1
25 TABLET ORAL
Qty: 14 | Refills: 0 | Status: DISCONTINUED | COMMUNITY
Start: 2022-02-12

## 2022-08-10 RX ORDER — AMOXICILLIN 500 MG/1
500 TABLET, FILM COATED ORAL
Qty: 21 | Refills: 0 | Status: DISCONTINUED | COMMUNITY
Start: 2022-05-12

## 2022-08-10 RX ORDER — SODIUM POLYSTYRENE SULFONATE 4.1 MEQ/G
POWDER, FOR SUSPENSION ORAL; RECTAL
Qty: 453 | Refills: 0 | Status: DISCONTINUED | COMMUNITY
Start: 2022-03-02

## 2022-08-10 NOTE — PHYSICAL EXAM
[Fully active, able to carry on all pre-disease performance without restriction] : Status 0 - Fully active, able to carry on all pre-disease performance without restriction [Normal] : affect appropriate [de-identified] : trace edema, pre-tibial
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
## 2022-08-10 NOTE — ASSESSMENT
[FreeTextEntry1] : Natanael Yang is seen in the office today in follow-up.  He started on nivolumab in April 19, but there was a delay in authorization from his insurance company and he was not able to get the cabozantinib until May 2.  Today is cycle #5 of cabozantinib and nivolumab.  He is due for a PET/CT, but this not been scheduled as yet.  Overall, he states that he feels "good".  There is no pains.  His appetite is good.  He reports no mouth sores or dysgeusia.  There is no nausea vomiting diarrhea constipation.  He has no palmar plantar erythrodysesthesia at this time, but he says he did have some issues earlier in the course of treatment.  There were no blisters or pain when walking.  There is no cough or shortness of breath.  Urinary flow is good.  There is no bleeding hematuria dysuria nocturia occurs twice.  There is no incontinence.  He works full-time and he drives a shuttle bus.  He reports no headaches or dizziness.  There are no balance issues.  There is no fatigue.  He says he does not have any anxiety.  There is no weight loss.  He has no back pains.  There are no fevers or chills.  He notes occasional nasal congestion.  There is no dysphonia.  He has occasional cramps in the muscles.  He has some left shoulder pains at times which are intermittent.  He has occasional paresthesias in his feet, which are also intermittent.\par \par On physical examination, he appears well and in no acute distress.  His performance status is 0.  His blood pressure was 152/94 upon arrival.  The pulse was 68 bpm.  He states that he is taking his blood pressure medications.  Repeat blood pressure later in the visit was 135/93.  The HEENT examination is normal.  There is no palpable adenopathy.  The lungs are clear and the heart examination is normal.  There is trace pretibial edema present.  There is no palpable abnormality upon examination of the abdomen.  The extremities are normal.  There is no spinal column or chest wall tenderness to palpation or percussion.\par \par He did not have his blood work done before the visit.  He was somewhat confused as to when he was being treated.  He was instructed to have blood work done before today's treatment.  I am going to go forward with the treatment and we will draw blood work at the time of his treatment.  In the future he must have blood work done before his treatment visit or else he will not receive treatment.\par \par His insurance company is notified that they will not supply any further medication until he has had his imaging studies.  The PET scan orders was placed on August 1, but he is yet to set it up.  He says he will try to have it done by this coming Monday.\par \par He has remarkable little toxicity for nivolumab and cabozantinib.  I reinforced that he must take his blood pressure medications on a regular basis.  Elevated blood pressures brought on by cabozantinib can result in microangiopathic hemolytic anemia and renal damage.\par \par All questions were answered to the best my ability and to his apparent satisfaction.  He has another appointment scheduled for September 6.

## 2022-08-10 NOTE — REVIEW OF SYSTEMS
[Lower Ext Edema] : lower extremity edema [Joint Pain] : joint pain [Negative] : Gastrointestinal [Fever] : no fever [Chills] : no chills [Fatigue] : no fatigue [Recent Change In Weight] : ~T no recent weight change [Chest Pain] : no chest pain [Palpitations] : no palpitations [Cough] : no cough [SOB on Exertion] : no shortness of breath during exertion [Dysuria] : no dysuria [Incontinence] : no incontinence [Joint Stiffness] : no joint stiffness [Skin Rash] : no skin rash [Dizziness] : no dizziness [Anxiety] : no anxiety [Depression] : no depression [Muscle Weakness] : no muscle weakness [Easy Bleeding] : no tendency for easy bleeding [Easy Bruising] : no tendency for easy bruising [FreeTextEntry4] : occ nasal congestion, no dysphonia, no dysgeusia [FreeTextEntry9] : occ cramps, joint pain, left shoulder, intermittent.  [de-identified] : No HA, occ paresthesias of feet, intermittent

## 2022-08-10 NOTE — RESULTS/DATA
[FreeTextEntry1] : EXAM: [id number] - PETCT CARLY-JESSICA ONC FDG INIT - ORDERED BY: JAREK FERREIRA\par PROCEDURE DATE: 03/15/2022\par INTERPRETATION: PROCEDURE: PET/CT SKULL BASE-MID THIGH IMAGING\par CLINICAL INFORMATION: 56-year-old male referred for evaluation of recurrent metastatic kidney cancer status post right nephrectomy. PET/CT is done as part of the initial treatment strategy evaluation.\par \par RADIOPHARMACEUTICAL: 12.07 mCi F-18, FDG, I.V.\par TECHNIQUE: Fasting blood sugar measured prior to injection of radiopharmaceutical was 88 mg/dl. Following intravenous injection of the radiopharmaceutical and an uptake period of approximately 50 minutes, FDG-PET/CT was obtained on a [employer] Discovery 710 from skull base to mid thigh. Oral contrast was given during the uptake period. CT was performed during shallow respiration. The CT protocol was optimized for PET attenuation correction and anatomic localization. The CT protocol was not designed to produce and cannot replace state-of-the-art diagnostic CT images with specific imaging protocols for different body parts and indications. Images were reviewed on a dedicated workstation using multiplanar reconstruction.\par \par The standardized uptake values (SUV) are normalized to patient body weight and indicate the highest activity concentration (SUVmax) in a given disease site. All image numbers refer to axial image number.\par \par COMPARISON: No prior PET/CT is available.\par \par OTHER STUDIES USED FOR CORRELATION: CT head dated 11/29/2021 and CT chest, abdomen, and pelvis dated 11/25/2021.\par \par FINDINGS:\par \par HEAD/NECK: Physiologic FDG activity in visualized brain, head, and neck.\par \par CHEST: There is an enlarged FDG-avid subcarinal lymph node which is increased in size as compared to CT from 11/25/2021, measuring 3.6 x 2.3 cm, SUV 5.5 (99); previously 2.4 x 1.7 cm. There is a difficult to delineate FDG-avid left perihilar lymph node which also is increased in size, measuring approximately 1.8 x 1.7 cm, SUV 4.4 (image 100); previously 1.8 x 1 0 cm.\par \par LUNGS: There are 3 mildly FDG-avid left lower lobe pulmonary nodules which are similar or increased in size as compared to CT dated 11/25/2021. For reference, the largest, most FDG-avid nodule is located in the peripheral left lower lobe measuring 2.0 x 1.9 cm, SUV 5.7 (image 117); previously 2.0 x 1.9 cm. Another left lower lobe nodule measures 1.4 x 1.3 cm, SUV 1.9 (image 112); previously 1.0 x 0.8 cm. A more superiorly located left lower lobe nodule measures 1.2 x 1.1 cm, SUV 1.1 (image 100); previously 0.8 x 1.0 cm. Biapical bullous disease and paraseptal emphysema, unchanged.\par \par PLEURA/PERICARDIUM: No abnormal FDG activity. No effusion.\par \par HEPATOBILIARY/PANCREAS: Physiologic FDG activity. Liver SUV mean is 3.0.\par Coarse pancreatic head calcifications compatible chronic pancreatitis change.\par \par SPLEEN: Physiologic FDG activity. Normal in size.\par \par ADRENAL GLANDS: No abnormal FDG activity. Nodularity of the left inferior adrenal gland is unchanged. Right adrenal gland is within normal limits.\par \par KIDNEYS/URINARY BLADDER: Status post right nephrectomy. Lobulated soft tissue in right renal bed is unchanged in size and demonstrates increased FDG activity, measuring 4.2 x 2.2 cm, SUV 5.1 (image 156). There are 4 FDG-avid left renal masses which are not significantly changed in size as compared to CT dated 11/25/2021. The largest lesion, located in the anterior aspect of the left kidney measures 7.3 x 7.2 cm and demonstrates peripheral FDG activity and central photopenia (SUV 5.0; image 162). In the posterior aspect of the left kidney at the same level, a 4.6 x 4.0 cm mass demonstrates SUV 5.7 (image 156). A partially calcified, lobulated left upper pole exophytic lesion measuring 3.5 x 2.3 cm demonstrates SUV 5.9 (image 144). Left lower pole exophytic mass measuring 4.8 x 4.1 cm demonstrates SUV 3.9 (image 176). Staghorn calculus in left kidney, unchanged. No hydronephrosis.\par \par REPRODUCTIVE ORGANS: No abnormal FDG activity.\par \par ABDOMINOPELVIC NODES: No enlarged or FDG-avid lymph node.\par \par BOWEL/PERITONEUM/MESENTERY: Multiple FDG-avid omental nodules and masses are similar, or mildly increased in size. For reference, a mass in the anterior left mid abdomen measures 7.0 x 4.8 cm, SUV 6.7 (image 167); previously 6.7 x 5.2 cm.\par \par VESSELS: Infrarenal IVC filter. Heterogeneous expansion of the IVC and bilateral iliofemoral veins compatible with DVT.\par \par RETROPERITONEUM/LYMPH NODES: FDG-avid retroperitoneal lymphadenopathy is not significantly changed in size. Reference lesion located anterior to right cell has muscle measures 3.6 x 2.0 cm, SUV 5.8 (image 191).\par \par BONES: Physiologic FDG activity.\par \par IMPRESSION: Abnormal FDG-PET/CT scan.\par \par 1. Multiple FDG-avid left renal masses are compatible with renal cell carcinomas. FDG-avid lobulated soft tissue in right renal bed is compatible with recurrent disease.\par \par 2. FDG-avid subcarinal, left perihilar and retroperitoneal lymphadenopathy is compatible with metastatic disease. Thoracic lymphadenopathy is increased in size since 11/25/2021, and retroperitoneal lymphadenopathy is not significantly changed.\par \par 3. Multiple FDG-avid omental nodules and masses, similar, or mildly increased in size, compatible with metastatic disease.\par \par 4. FDG-avid left lower lobe pulmonary nodules, similar, or mildly increased in size are compatible with metastatic disease.\par \par Findings emailed to Dr. Jarek Ferreira.\par \par --- End of Report ---\par \par RYAN MERINO MD; Attending Nuclear Medicine\par This document has been electronically signed. Mar 16 2022 11:21AM\par *******************************************************************\par \par

## 2022-08-11 DIAGNOSIS — Z51.11 ENCOUNTER FOR ANTINEOPLASTIC CHEMOTHERAPY: ICD-10-CM

## 2022-08-11 LAB
ALBUMIN SERPL ELPH-MCNC: 4.1 G/DL — SIGNIFICANT CHANGE UP (ref 3.3–5)
ALP SERPL-CCNC: 77 U/L — SIGNIFICANT CHANGE UP (ref 40–120)
ALT FLD-CCNC: 24 U/L — SIGNIFICANT CHANGE UP (ref 10–45)
ANION GAP SERPL CALC-SCNC: 17 MMOL/L — SIGNIFICANT CHANGE UP (ref 5–17)
AST SERPL-CCNC: 29 U/L — SIGNIFICANT CHANGE UP (ref 10–40)
BILIRUB SERPL-MCNC: 0.6 MG/DL — SIGNIFICANT CHANGE UP (ref 0.2–1.2)
BUN SERPL-MCNC: 35 MG/DL — HIGH (ref 7–23)
CALCIUM SERPL-MCNC: 8.8 MG/DL — SIGNIFICANT CHANGE UP (ref 8.4–10.5)
CHLORIDE SERPL-SCNC: 110 MMOL/L — HIGH (ref 96–108)
CO2 SERPL-SCNC: 17 MMOL/L — LOW (ref 22–31)
CREAT SERPL-MCNC: 3.56 MG/DL — HIGH (ref 0.5–1.3)
EGFR: 19 ML/MIN/1.73M2 — LOW
GLUCOSE SERPL-MCNC: 90 MG/DL — SIGNIFICANT CHANGE UP (ref 70–99)
LDH SERPL L TO P-CCNC: 352 U/L — HIGH (ref 50–242)
POTASSIUM SERPL-MCNC: 5.7 MMOL/L — HIGH (ref 3.5–5.3)
POTASSIUM SERPL-SCNC: 5.7 MMOL/L — HIGH (ref 3.5–5.3)
PROT SERPL-MCNC: 6.9 G/DL — SIGNIFICANT CHANGE UP (ref 6–8.3)
SODIUM SERPL-SCNC: 144 MMOL/L — SIGNIFICANT CHANGE UP (ref 135–145)

## 2022-09-02 ENCOUNTER — RESULT REVIEW (OUTPATIENT)
Age: 57
End: 2022-09-02

## 2022-09-02 ENCOUNTER — APPOINTMENT (OUTPATIENT)
Dept: HEMATOLOGY ONCOLOGY | Facility: CLINIC | Age: 57
End: 2022-09-02

## 2022-09-02 LAB
BASOPHILS # BLD AUTO: 0.02 K/UL — SIGNIFICANT CHANGE UP (ref 0–0.2)
BASOPHILS NFR BLD AUTO: 0.4 % — SIGNIFICANT CHANGE UP (ref 0–2)
EOSINOPHIL # BLD AUTO: 0.1 K/UL — SIGNIFICANT CHANGE UP (ref 0–0.5)
EOSINOPHIL NFR BLD AUTO: 2.1 % — SIGNIFICANT CHANGE UP (ref 0–6)
HCT VFR BLD CALC: 40.5 % — SIGNIFICANT CHANGE UP (ref 39–50)
HGB BLD-MCNC: 13.1 G/DL — SIGNIFICANT CHANGE UP (ref 13–17)
IMM GRANULOCYTES NFR BLD AUTO: 0.2 % — SIGNIFICANT CHANGE UP (ref 0–1.5)
LYMPHOCYTES # BLD AUTO: 1.38 K/UL — SIGNIFICANT CHANGE UP (ref 1–3.3)
LYMPHOCYTES # BLD AUTO: 28.7 % — SIGNIFICANT CHANGE UP (ref 13–44)
MCHC RBC-ENTMCNC: 32.3 G/DL — SIGNIFICANT CHANGE UP (ref 32–36)
MCHC RBC-ENTMCNC: 34 PG — SIGNIFICANT CHANGE UP (ref 27–34)
MCV RBC AUTO: 105.2 FL — HIGH (ref 80–100)
MONOCYTES # BLD AUTO: 0.35 K/UL — SIGNIFICANT CHANGE UP (ref 0–0.9)
MONOCYTES NFR BLD AUTO: 7.3 % — SIGNIFICANT CHANGE UP (ref 2–14)
NEUTROPHILS # BLD AUTO: 2.95 K/UL — SIGNIFICANT CHANGE UP (ref 1.8–7.4)
NEUTROPHILS NFR BLD AUTO: 61.3 % — SIGNIFICANT CHANGE UP (ref 43–77)
NRBC # BLD: 0 /100 WBCS — SIGNIFICANT CHANGE UP (ref 0–0)
PLATELET # BLD AUTO: 207 K/UL — SIGNIFICANT CHANGE UP (ref 150–400)
RBC # BLD: 3.85 M/UL — LOW (ref 4.2–5.8)
RBC # FLD: 18.1 % — HIGH (ref 10.3–14.5)
WBC # BLD: 4.81 K/UL — SIGNIFICANT CHANGE UP (ref 3.8–10.5)
WBC # FLD AUTO: 4.81 K/UL — SIGNIFICANT CHANGE UP (ref 3.8–10.5)

## 2022-09-03 ENCOUNTER — APPOINTMENT (OUTPATIENT)
Dept: NUCLEAR MEDICINE | Facility: IMAGING CENTER | Age: 57
End: 2022-09-03

## 2022-09-03 ENCOUNTER — OUTPATIENT (OUTPATIENT)
Dept: OUTPATIENT SERVICES | Facility: HOSPITAL | Age: 57
LOS: 1 days | End: 2022-09-03
Payer: MEDICAID

## 2022-09-03 DIAGNOSIS — Z98.89 OTHER SPECIFIED POSTPROCEDURAL STATES: Chronic | ICD-10-CM

## 2022-09-03 DIAGNOSIS — C64.9 MALIGNANT NEOPLASM OF UNSPECIFIED KIDNEY, EXCEPT RENAL PELVIS: ICD-10-CM

## 2022-09-03 DIAGNOSIS — Z90.5 ACQUIRED ABSENCE OF KIDNEY: Chronic | ICD-10-CM

## 2022-09-03 LAB
ALBUMIN SERPL ELPH-MCNC: 3.8 G/DL
ALP BLD-CCNC: 71 U/L
ALT SERPL-CCNC: 23 U/L
ANION GAP SERPL CALC-SCNC: 16 MMOL/L
AST SERPL-CCNC: 28 U/L
BILIRUB SERPL-MCNC: 0.4 MG/DL
BUN SERPL-MCNC: 39 MG/DL
CALCIUM SERPL-MCNC: 8.3 MG/DL
CHLORIDE SERPL-SCNC: 112 MMOL/L
CO2 SERPL-SCNC: 15 MMOL/L
CREAT SERPL-MCNC: 3.33 MG/DL
EGFR: 21 ML/MIN/1.73M2
GLUCOSE SERPL-MCNC: 97 MG/DL
LDH SERPL-CCNC: 319 U/L
POTASSIUM SERPL-SCNC: 4.9 MMOL/L
PROT SERPL-MCNC: 6.6 G/DL
SODIUM SERPL-SCNC: 142 MMOL/L
T4 FREE SERPL-MCNC: 1 NG/DL
TSH SERPL-ACNC: 4.02 UIU/ML

## 2022-09-03 PROCEDURE — A9552: CPT

## 2022-09-03 PROCEDURE — 78815 PET IMAGE W/CT SKULL-THIGH: CPT | Mod: 26,PS

## 2022-09-03 PROCEDURE — 78815 PET IMAGE W/CT SKULL-THIGH: CPT

## 2022-09-06 ENCOUNTER — APPOINTMENT (OUTPATIENT)
Dept: HEMATOLOGY ONCOLOGY | Facility: CLINIC | Age: 57
End: 2022-09-06

## 2022-09-07 ENCOUNTER — RESULT REVIEW (OUTPATIENT)
Age: 57
End: 2022-09-07

## 2022-09-07 ENCOUNTER — APPOINTMENT (OUTPATIENT)
Dept: INFUSION THERAPY | Facility: HOSPITAL | Age: 57
End: 2022-09-07

## 2022-09-07 ENCOUNTER — APPOINTMENT (OUTPATIENT)
Dept: HEMATOLOGY ONCOLOGY | Facility: CLINIC | Age: 57
End: 2022-09-07

## 2022-09-07 VITALS
RESPIRATION RATE: 16 BRPM | DIASTOLIC BLOOD PRESSURE: 88 MMHG | WEIGHT: 256.82 LBS | HEART RATE: 76 BPM | TEMPERATURE: 97 F | SYSTOLIC BLOOD PRESSURE: 132 MMHG | BODY MASS INDEX: 33.39 KG/M2 | OXYGEN SATURATION: 98 %

## 2022-09-07 LAB
BASOPHILS # BLD AUTO: 0.03 K/UL — SIGNIFICANT CHANGE UP (ref 0–0.2)
BASOPHILS NFR BLD AUTO: 0.6 % — SIGNIFICANT CHANGE UP (ref 0–2)
EOSINOPHIL # BLD AUTO: 0.13 K/UL — SIGNIFICANT CHANGE UP (ref 0–0.5)
EOSINOPHIL NFR BLD AUTO: 2.4 % — SIGNIFICANT CHANGE UP (ref 0–6)
HCT VFR BLD CALC: 42.7 % — SIGNIFICANT CHANGE UP (ref 39–50)
HGB BLD-MCNC: 13.7 G/DL — SIGNIFICANT CHANGE UP (ref 13–17)
IMM GRANULOCYTES NFR BLD AUTO: 0.4 % — SIGNIFICANT CHANGE UP (ref 0–1.5)
LYMPHOCYTES # BLD AUTO: 1.64 K/UL — SIGNIFICANT CHANGE UP (ref 1–3.3)
LYMPHOCYTES # BLD AUTO: 30.8 % — SIGNIFICANT CHANGE UP (ref 13–44)
MCHC RBC-ENTMCNC: 32.1 G/DL — SIGNIFICANT CHANGE UP (ref 32–36)
MCHC RBC-ENTMCNC: 34.3 PG — HIGH (ref 27–34)
MCV RBC AUTO: 106.8 FL — HIGH (ref 80–100)
MONOCYTES # BLD AUTO: 0.46 K/UL — SIGNIFICANT CHANGE UP (ref 0–0.9)
MONOCYTES NFR BLD AUTO: 8.6 % — SIGNIFICANT CHANGE UP (ref 2–14)
NEUTROPHILS # BLD AUTO: 3.04 K/UL — SIGNIFICANT CHANGE UP (ref 1.8–7.4)
NEUTROPHILS NFR BLD AUTO: 57.2 % — SIGNIFICANT CHANGE UP (ref 43–77)
NRBC # BLD: 0 /100 WBCS — SIGNIFICANT CHANGE UP (ref 0–0)
PLATELET # BLD AUTO: 226 K/UL — SIGNIFICANT CHANGE UP (ref 150–400)
RBC # BLD: 4 M/UL — LOW (ref 4.2–5.8)
RBC # FLD: 17.6 % — HIGH (ref 10.3–14.5)
WBC # BLD: 5.32 K/UL — SIGNIFICANT CHANGE UP (ref 3.8–10.5)
WBC # FLD AUTO: 5.32 K/UL — SIGNIFICANT CHANGE UP (ref 3.8–10.5)

## 2022-09-07 PROCEDURE — 99213 OFFICE O/P EST LOW 20 MIN: CPT
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
## 2022-09-07 NOTE — PHYSICAL EXAM
[Fully active, able to carry on all pre-disease performance without restriction] : Status 0 - Fully active, able to carry on all pre-disease performance without restriction [Normal] : affect appropriate [de-identified] : anicteric  [de-identified] : no edema  [de-identified] : no palpable abnormality of left abdominopelvic wall as seen on PET scan.

## 2022-09-07 NOTE — REVIEW OF SYSTEMS
[Fever] : no fever [Chills] : no chills [Night Sweats] : no night sweats [Fatigue] : no fatigue [Recent Change In Weight] : ~T no recent weight change [Eye Pain] : no eye pain [Vision Problems] : no vision problems [Dysphagia] : no dysphagia [Odynophagia] : no odynophagia [Mucosal Pain] : no mucosal pain [Chest Pain] : no chest pain [Palpitations] : no palpitations [Lower Ext Edema] : no lower extremity edema [Shortness Of Breath] : no shortness of breath [Cough] : no cough [Abdominal Pain] : no abdominal pain [Vomiting] : no vomiting [Constipation] : no constipation [Diarrhea] : no diarrhea [Dysuria] : no dysuria [Incontinence] : no incontinence [Joint Pain] : no joint pain [Joint Stiffness] : no joint stiffness [Muscle Pain] : no muscle pain [Skin Rash] : no skin rash [Dizziness] : no dizziness [Anxiety] : no anxiety [Depression] : no depression [Hot Flashes] : no hot flashes [Muscle Weakness] : no muscle weakness [Easy Bleeding] : no tendency for easy bleeding [Easy Bruising] : no tendency for easy bruising

## 2022-09-07 NOTE — ASSESSMENT
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 Nivolumab started 4/19/22, start of cabozantinib delayed to 5/2/22 d/t insurance issues. \par \par Met RCC:\par - Cabozantinib 40mg daily + nivolumab cycle 6 today. Tolerating well with no significant AEs. Potential side effects of treatment reviewed again today. \par - 9/3/22 PET scan shows stable to improvement in disease, plan to continue current treatment. \par \par Tooth infection:\par - Reportedly has an infection of his top front tooth, he was prescribed amoxicillin by his dentist and advised that he needs a tooth extraction. I discussed the r/o improper wound healing while on TKIs and reiterated the need to hold cabozantinib before and after any invasive procedures. \par - I previously spoke with pt's dentist Dr. French [phone]. The tooth is severely infected and unlikely to respond to antibiotics alone, he believes the tooth will need to be extracted though it does not have to be done urgently. Pt will have to hold cabozantinib 1wk before and after the extraction. Instructed to hold off until after repeat imaging if possible as holding cabozantinib close to imaging may show false progression on imaging. \par - No significant tooth pain at present, will continue to monitor closely for now. \par \par CKD:\par - Baseline Cr is 2.89 - 3.46\par - 9/2/22 Cr = 3.33, stable\par \par Hypocalcemia:\par - 7/8/22 Ca = 7.9, he started OTC calcium 500 or 600mg bid. \par - Continue to monitor \par \par Instructed to contact our office with any new/worsening symptoms.\par Pt educated regarding plan of care, all questions/concerns addressed to the best of my abilities and his apparent satisfaction.\par F/u in 4wks for provider visit + nivo cycle 7. He will have labs done at [facility] the Friday before his tx appt.

## 2022-09-07 NOTE — RESULTS/DATA
[FreeTextEntry1] : 9/2/22 labs reviewed with pt \par *************************************************\par \par EXAM: [id number] - PETCT SKUL-THI ONC FDG SUBS  - ORDERED BY: KEERTHI FERREIRA\par \par PROCEDURE DATE:  09/03/2022\par \par INTERPRETATION:  PROCEDURE:  PET/CT SKULL BASE-MID THIGH IMAGING\par \par CLINICAL INFORMATION: 57-year-old male with recurrent metastatic renal cell cancer, on treatment with carbozanitib and nivolumab. PET/CT is done as part of subsequent treatment strategy evaluation.\par \par RADIOPHARMACEUTICAL:  13.81 mCi F-18, FDG, I.V.\par \par TECHNIQUE:  Fasting blood sugar measured prior to injection of radiopharmaceutical was 95 mg/dl. Following intravenous injection of the radiopharmaceutical and an uptake period of approximately 60 minutes, FDG-PET/CT was obtained on a [employer] 710 scanner from skull base to mid thigh. Oral contrast was given during the uptake period. CT was performed during shallow respiration. The CT protocol was optimized for PET attenuation correction and anatomic localization. The CT protocol was not designed to produce and cannot replace state-of-the-art diagnostic CT images with specific imaging protocols for different body parts and indications. Images were reviewed on a dedicated workstation using multiplanar reconstruction.\par \par The standardized uptake values (SUV) are normalized to patient body weight and indicate the highest activity concentration (SUVmax) in a given disease site. All image numbers refer to axial image number.\par \par COMPARISON:  PET/CT from 3/15/2022.\par \par OTHER STUDIES USED FOR CORRELATION: No interval related imaging studies.\par \par FINDINGS:\par \par HEAD/NECK: Physiologic FDG activity in visualized brain, head, and neck.\par \par CHEST: FDG avid subcarinal lymph node is minimally decreased in size but unchanged in avidity measuring 3.2 x 1.6 cm with SUV 6.6; image 97; previously 3.6 x 2.3 cm with SUV 5.5). Difficult to delineate left perihilar lymph node is unchanged in size and avidity measuring approximately 1.8 x 1.7 cm with SUV 4.7; image 98; previously 1.8 x 1.7 cm with SUV 4.4).\par \par LUNGS: Again seen are 3 left lower lobe pulmonary nodules which have decreased size and either resolved or decreased avidity. For reference, the largest, most FDG avid nodule located in the peripheral left lower lobe currently measures 1.7 x 1.4 cm with SUV 3.6 (image 115; previously 2 x 1.9 cm with SUV 5.7). The centrally located nodule measures 0.9 x 1 cm with SUV 1.2 (image 110; previously 1.4 x 1.3 cm with SUV 1.9). Interval resolution of FDG avidity associated with the superiorly located nodule measures 0.9 x 0.9 cm (previously 1.2 x 1.1 cm avid SUV 1.1). No new nodules. Biapical bullous disease and paraseptal emphysema, unchanged.\par \par PLEURA/PERICARDIUM: No abnormal FDG activity. No effusion.\par \par HEPATOBILIARY/PANCREAS:  Physiologic FDG activity. Liver SUV mean is 2.5, previously 3.0. Unchanged course pancreatic head calcifications compatible with history of chronic pancreatitis.\par \par SPLEEN: Physiologic FDG activity. Normal in size.\par \par ADRENAL GLANDS: No abnormal FDG activity. Nodularity in the bilateral adrenal glands, unchanged.\par \par KIDNEYS/URINARY BLADDER: Status post right nephrectomy. Lobulated soft tissue in the right renal bed has minimally decreased in size but unchanged in avidity measuring 3.6 x 1.7 cm with SUV 6.6; image 159; previously 4.2 x 2.2 cm with SUV 5.1).\par \par Again seen are 4 FDG avid left renal masses which are either decreased or not significantly changed in size. The largest lesion, located in the anterior aspect of the left kidney measures 5.7 x 5.0 cm with central photopenia and peripheral FDG avidity with SUV 5.1 (image 163; previously 7.3 x 7.2 cm with peripheral FDG avidity with SUV 5.0). In the posterior aspect of the same level 4.7 x 4.6 cm mass is noted with new central photopenia and peripheral avidity with SUV 5.5 (image 163; previous 4.6 x 4.0 cm with SUV 5.7). The partially calcified, lobulated left upper pole exophytic lesion measures 3.0 x 1.9 cm with SUV 3.5 (image 146; previously 3.5 x 2.3 cm with SUV 5.9). The exophytic left lower pole mass measures approximately 4.4 x 4.1 cm with SUV 3.3 (image 178; previously 4.8 x 4.1 cm with SUV 3.9). Unchanged left renal staghorn calculus. No hydronephrosis.\par \par REPRODUCTIVE ORGANS: No abnormal FDG activity.\par \par ABDOMINOPELVIC NODES: FDG avid retroperitoneal lymphadenopathy is not significantly changed in size and avidity. Reference lesion located anterior to the right psoas muscle measures 3.8 x 1.5 cm with SUV 5.7 (image 188; previously 3.6 x 2.0 cm with SUV 5.8).\par \par BOWEL/PERITONEUM/MESENTERY: Multiple FDG avid omental nodules and masses are similar or mildly decreased or increased in size and avidity. Mass in the anterior left midabdomen measures approximately 5.6 x 4.3 cm with SUV 9.1 (image 167; previously 7.0 x 4.8 cm with SUV 6.7). A mass in the left lateral abdomen just anterior to the left kidney measures approximately 6.1 x 3.7 cm with SUV 9.3 (image 169; previously approximately 7.2 x 4.7 cm with SUV 6.6-remeasured).\par \par New diffuse gastric hypermetabolism (SUV 8.1; image 129) with questionable wall thickening on CT.\par \par BONES/No abnormal FDG activity.\par \par SOFT TISSUES: Nonspecific new mildly FDG avid subtle skin thickening in the anterior pelvic wall just to the left of midline (SUV 4.7; image 233).\par \par IMPRESSION: Compared to FDG-PET/CT scan dated 3/15/2022:\par \par 1. Multiple FDG avid left renal masses not significantly changed in size and avidity compatible with renal cell carcinoma. The posteriorly located mass demonstrates new central photopenia, likely representing necrosis. FDG avid lobulated soft tissue in the right renal bed is minimally decreased in size but not significantly changed in avidity.\par \par 2. FDG avid subcarinal, left perihilar, and retroperitoneal lymphadenopathy, either minimally increased or decreased in size and avidity. These are compatible with metastatic disease.\par \par 3. Multiple FDG avid omental nodules and masses, are minimally decreased in size but increased in avidity.\par \par 4. Left lower lobe pulmonary nodules are decreased in size in either resolved or decreased avidity.\par \par 5. New FDG avid questionable diffuse gastric wall thickening. Please correlate clinically for gastritis or with endoscopy further evaluation as indicated.\par \par 6. Nonspecific new mildly FDG avid subtle wall thickening in the anterior pelvic wall just to the left of the midline. Please correlate with direct visualization.\par
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
## 2022-09-30 ENCOUNTER — APPOINTMENT (OUTPATIENT)
Dept: HEMATOLOGY ONCOLOGY | Facility: CLINIC | Age: 57
End: 2022-09-30

## 2022-09-30 ENCOUNTER — RESULT REVIEW (OUTPATIENT)
Age: 57
End: 2022-09-30

## 2022-09-30 LAB
BASOPHILS # BLD AUTO: 0.03 K/UL — SIGNIFICANT CHANGE UP (ref 0–0.2)
BASOPHILS NFR BLD AUTO: 0.6 % — SIGNIFICANT CHANGE UP (ref 0–2)
EOSINOPHIL # BLD AUTO: 0.09 K/UL — SIGNIFICANT CHANGE UP (ref 0–0.5)
EOSINOPHIL NFR BLD AUTO: 1.9 % — SIGNIFICANT CHANGE UP (ref 0–6)
HCT VFR BLD CALC: 41.4 % — SIGNIFICANT CHANGE UP (ref 39–50)
HGB BLD-MCNC: 13 G/DL — SIGNIFICANT CHANGE UP (ref 13–17)
IMM GRANULOCYTES NFR BLD AUTO: 0.2 % — SIGNIFICANT CHANGE UP (ref 0–0.9)
LYMPHOCYTES # BLD AUTO: 1.49 K/UL — SIGNIFICANT CHANGE UP (ref 1–3.3)
LYMPHOCYTES # BLD AUTO: 30.7 % — SIGNIFICANT CHANGE UP (ref 13–44)
MCHC RBC-ENTMCNC: 31.4 G/DL — LOW (ref 32–36)
MCHC RBC-ENTMCNC: 34 PG — SIGNIFICANT CHANGE UP (ref 27–34)
MCV RBC AUTO: 108.4 FL — HIGH (ref 80–100)
MONOCYTES # BLD AUTO: 0.49 K/UL — SIGNIFICANT CHANGE UP (ref 0–0.9)
MONOCYTES NFR BLD AUTO: 10.1 % — SIGNIFICANT CHANGE UP (ref 2–14)
NEUTROPHILS # BLD AUTO: 2.74 K/UL — SIGNIFICANT CHANGE UP (ref 1.8–7.4)
NEUTROPHILS NFR BLD AUTO: 56.5 % — SIGNIFICANT CHANGE UP (ref 43–77)
NRBC # BLD: 0 /100 WBCS — SIGNIFICANT CHANGE UP (ref 0–0)
PLATELET # BLD AUTO: 233 K/UL — SIGNIFICANT CHANGE UP (ref 150–400)
RBC # BLD: 3.82 M/UL — LOW (ref 4.2–5.8)
RBC # FLD: 16.3 % — HIGH (ref 10.3–14.5)
T4 FREE SERPL-MCNC: 1 NG/DL
TSH SERPL-ACNC: 4.68 UIU/ML
WBC # BLD: 4.85 K/UL — SIGNIFICANT CHANGE UP (ref 3.8–10.5)
WBC # FLD AUTO: 4.85 K/UL — SIGNIFICANT CHANGE UP (ref 3.8–10.5)

## 2022-10-03 LAB
ALBUMIN SERPL ELPH-MCNC: 4.1 G/DL
ALP BLD-CCNC: 97 U/L
ALT SERPL-CCNC: 34 U/L
ANION GAP SERPL CALC-SCNC: 15 MMOL/L
AST SERPL-CCNC: 29 U/L
BILIRUB SERPL-MCNC: 0.5 MG/DL
BUN SERPL-MCNC: 42 MG/DL
CALCIUM SERPL-MCNC: 8.6 MG/DL
CHLORIDE SERPL-SCNC: 113 MMOL/L
CO2 SERPL-SCNC: 15 MMOL/L
CREAT SERPL-MCNC: 3.72 MG/DL
EGFR: 18 ML/MIN/1.73M2
GLUCOSE SERPL-MCNC: 92 MG/DL
LDH SERPL-CCNC: 324 U/L
POTASSIUM SERPL-SCNC: 5.9 MMOL/L
PROT SERPL-MCNC: 7 G/DL
SODIUM SERPL-SCNC: 144 MMOL/L

## 2022-10-04 ENCOUNTER — APPOINTMENT (OUTPATIENT)
Dept: HEMATOLOGY ONCOLOGY | Facility: CLINIC | Age: 57
End: 2022-10-04

## 2022-10-04 ENCOUNTER — APPOINTMENT (OUTPATIENT)
Dept: INFUSION THERAPY | Facility: HOSPITAL | Age: 57
End: 2022-10-04

## 2022-10-04 ENCOUNTER — EMERGENCY (EMERGENCY)
Facility: HOSPITAL | Age: 57
LOS: 0 days | Discharge: HOME | End: 2022-10-04
Attending: EMERGENCY MEDICINE

## 2022-10-04 VITALS
DIASTOLIC BLOOD PRESSURE: 96 MMHG | HEART RATE: 88 BPM | SYSTOLIC BLOOD PRESSURE: 137 MMHG | RESPIRATION RATE: 16 BRPM | TEMPERATURE: 98 F | HEIGHT: 74 IN | WEIGHT: 255.07 LBS | OXYGEN SATURATION: 98 %

## 2022-10-04 VITALS
SYSTOLIC BLOOD PRESSURE: 130 MMHG | OXYGEN SATURATION: 98 % | DIASTOLIC BLOOD PRESSURE: 90 MMHG | TEMPERATURE: 98 F | RESPIRATION RATE: 17 BRPM | HEART RATE: 82 BPM

## 2022-10-04 DIAGNOSIS — M25.562 PAIN IN LEFT KNEE: ICD-10-CM

## 2022-10-04 DIAGNOSIS — Z90.5 ACQUIRED ABSENCE OF KIDNEY: Chronic | ICD-10-CM

## 2022-10-04 DIAGNOSIS — Z98.89 OTHER SPECIFIED POSTPROCEDURAL STATES: Chronic | ICD-10-CM

## 2022-10-04 PROCEDURE — 73562 X-RAY EXAM OF KNEE 3: CPT | Mod: 26,LT

## 2022-10-04 PROCEDURE — 99284 EMERGENCY DEPT VISIT MOD MDM: CPT

## 2022-10-04 PROCEDURE — 93970 EXTREMITY STUDY: CPT | Mod: 26

## 2022-10-04 RX ORDER — ACETAMINOPHEN 500 MG
650 TABLET ORAL ONCE
Refills: 0 | Status: COMPLETED | OUTPATIENT
Start: 2022-10-04 | End: 2022-10-04

## 2022-10-04 RX ADMIN — Medication 650 MILLIGRAM(S): at 15:01

## 2022-10-04 RX ADMIN — Medication 650 MILLIGRAM(S): at 14:31

## 2022-10-04 NOTE — ED PROVIDER NOTE - OBJECTIVE STATEMENT
57-year-old male history of right nephrectomy due to a mass and left renal cancer currently being treated with chemotherapy and radiation, history of left lower extremity DVT 20 years ago on Eliquis presents for left knee pain and swelling.  Patient states that he first noticed it 3 days ago and has not gotten better so he came to the ER for further evaluation.  Patient states her pain has been taking Tylenol with last dose last night with moderate relief.  Patient denies any injury to the leg. Patient denies any headache, dizziness, lightheadedness, fainting, chest pain, palpitations, shortness of breath, abdominal pain, nausea, vomiting, diarrhea, constipation, loss of appetite, fevers, chills, urinary symptoms.

## 2022-10-04 NOTE — ED ADULT NURSE NOTE - OBJECTIVE STATEMENT
Pt received AAOx4  c/o LLE swelling x 4 days. More so around left knee as per pt. Pt denies any fall/trauma. Pt denies any Cx pain, SOB. No labored breathing observed. Will cont' to monitor. Pt Hx DVT. Pt received AAOx4  c/o LLE swelling x 4 days. More so around left knee as per pt. Pt denies any fall/trauma. Pt denies any Cx pain, SOB. No labored breathing observed. Will cont' to monitor. Pt Hx DVT on eliquis.

## 2022-10-04 NOTE — ED PROVIDER NOTE - NSFOLLOWUPINSTRUCTIONS_ED_ALL_ED_FT
Knee Effusion      Knee effusion means that you have extra fluid in your knee. This can cause pain and swelling. Your knee may be more difficult to bend and move.    Common causes of knee effusion are:  •Arthritis.      •Infection.      •Injury.      •Autoimmune disease. This means that your body's defense system (immune system) mistakenly attacks healthy body tissues.        Follow these instructions at home:    If you have a brace or an immobilizer:     •Wear it as told by your doctor. Take it off only as told by your doctor.      •Check the skin around it every day. Tell your doctor if you see problems.    •Loosen the brace or immobilizer if your toes:  •Tingle.      •Become numb.      •Turn cold and blue.        •Keep the brace or immobilizer clean.    •If the brace or immobilizer is not waterproof:  •Do not let it get wet.      •Cover it with a watertight covering when you take a bath or shower.          Managing pain, stiffness, and swelling    •If told, put ice on the affected area. To do this:  •If you have a removable brace or immobilizer, take it off as told by your doctor.      •Put ice in a plastic bag.      •Place a towel between your skin and the bag.      •Leave the ice on for 20 minutes, 2–3 times a day.      •Take off the ice if your skin turns bright red. This is very important. If you cannot feel pain, heat, or cold, you have a greater risk of damage to the area.        •Move your toes often.      •Raise your knee above the level of your heart while you are sitting or lying down.      Activity     •Rest as told by your doctor.      •Use crutches to support your body weight. Do not use your injured leg to support your body weight until your doctor says that you can.      •Do exercises as told by your doctor.      General instructions     •Take over-the-counter and prescription medicines only as told by your doctor.      • Do not smoke or use any products that contain nicotine or tobacco. If you need help quitting, ask your doctor.      •Wear an elastic bandage or a wrap that puts pressure on your knee (compression wrap) as told by your doctor.      •Keep all follow-up visits.        Contact a doctor if:    •You continue to have pain in your knee.      •You have a fever or chills.        Get help right away if:    •You have swelling or redness in your knee that gets worse or does not get better.      •You have very bad pain in your knee.        Summary    •Knee effusion is when you have extra fluid in your knee. This can cause pain and swelling and can make it hard to bend and move your knee.      •Take over-the-counter and prescription medicines only as told by your doctor.      •If you have a brace or an immobilizer, wear it as told by your doctor.      This information is not intended to replace advice given to you by your health care provider. Make sure you discuss any questions you have with your health care provider.

## 2022-10-04 NOTE — ED PROVIDER NOTE - PATIENT PORTAL LINK FT
You can access the FollowMyHealth Patient Portal offered by [facility] by registering at the following website: [email or web address]. By joining [facility]’s FollowMyHealth portal, you will also be able to view your health information using other applications (apps) compatible with our system.

## 2022-10-04 NOTE — ED PROVIDER NOTE - ATTENDING CONTRIBUTION TO CARE
Patient evaluated and seen with NP agree with above history and physical - pt examined and seen by me personally - findings as seen: Pt with knee pain and swelling on left with slight warmth to joint. US noted slightly improved DVT noted which was present before as well. Pt able to move left knee otherwise. Patient evaluated and seen with NP agree with above history and physical - pt examined and seen by me personally - findings as seen: Pt with knee pain and swelling on left with slight warmth to joint. US noted slightly improved DVT noted which was present before as well. Pt able to move left knee otherwise and otherwise suspected knee swelling likely secondary to arthritis - will dc with ortho follow up.

## 2022-10-04 NOTE — ED ADULT TRIAGE NOTE - BP NONINVASIVE DIASTOLIC (MM HG)
Follow up with your PMD within 48-72 hours. Recommend follow up with a Cardiologist- referral list provided within the week. Show copies of your reports given to.  Take all of your other medications as previously prescribed. Worsening, continued or new concerning symptoms return to the Emergency Department.
96

## 2022-10-04 NOTE — ED PROVIDER NOTE - PHYSICAL EXAMINATION
GEN:   comfortable, in no apparent distress, AOx3  EYES:   PERRL, extra-occular movements intact  HEENT:   airway patent, moist mucosal membranes, uvula midline  CV:  RRR, Pulses- Radial: 2+ bilateral and equal  RESP:   clear to auscultation bilaterally, non-labored, speaking in full sentences  ABD:   soft, non tender, no guarding  :   no cva tenderness  MSK:   b/l +1 pedal edema.  L knee ttp to posterior aspect with warmth and mild redness.  full range of motion and minimal increased pain with flexion of knee. Rest of PE: no musculoskeletal tenderness, 5/5 strength, moving all extremities  SKIN:   dry, intact, no rash  NEURO:   AOx3, no focal weakness or loss of sensation, gait normal, GCS 15  PSYCH: calm, cooperative, no apparent risk to self and others

## 2022-10-04 NOTE — ED PROVIDER NOTE - PROGRESS NOTE DETAILS
Patient reports feeling well.  DVT noted, patient states is complaint with eliquis and DVT noted likely chronic.  Patient made aware of this as well as effusion that requires f/u with ortho.  Patient states he has an orthopedic to f/u with.  ACE wrap provided for patient, patient able to ambulate.  Patient nontoxic and medically stable for discharge. Return precautions provided and patient understands to return to the ED for concerning or worsening signs and symptoms. Patient's questions answered.

## 2022-10-04 NOTE — ED ADULT TRIAGE NOTE - CHIEF COMPLAINT QUOTE
As per patient c/o LLE swelling x 4 days. Pitting edema noted.  Hx: Kidney Ca-currently receiving chemo, DVT LLE on eliquis.

## 2022-10-04 NOTE — ED PROVIDER NOTE - CLINICAL SUMMARY MEDICAL DECISION MAKING FREE TEXT BOX
57-year-old male history of right nephrectomy due to a mass and left renal cancer currently being treated with chemotherapy and radiation, history of left lower extremity DVT 20 years ago on Eliquis presents for left knee pain and swelling.  Patient states that he first noticed it 3 days ago and has not gotten better so he came to the ER for further evaluation.  Unlikely septic joint based on history and exam and no wounds.  Less likely DVT given patient on Eliquis.  Likely inflammatory in nature, but will obtain XR and US to assess for fx / DVT.  Tylenol for pain.

## 2022-10-06 ENCOUNTER — OUTPATIENT (OUTPATIENT)
Dept: OUTPATIENT SERVICES | Facility: HOSPITAL | Age: 57
LOS: 1 days | Discharge: HOME | End: 2022-10-06

## 2022-10-06 DIAGNOSIS — C64.9 MALIGNANT NEOPLASM OF UNSPECIFIED KIDNEY, EXCEPT RENAL PELVIS: ICD-10-CM

## 2022-10-06 DIAGNOSIS — Z90.5 ACQUIRED ABSENCE OF KIDNEY: Chronic | ICD-10-CM

## 2022-10-06 DIAGNOSIS — Z98.89 OTHER SPECIFIED POSTPROCEDURAL STATES: Chronic | ICD-10-CM

## 2022-10-11 ENCOUNTER — APPOINTMENT (OUTPATIENT)
Dept: HEMATOLOGY ONCOLOGY | Facility: CLINIC | Age: 57
End: 2022-10-11

## 2022-10-11 ENCOUNTER — APPOINTMENT (OUTPATIENT)
Dept: INFUSION THERAPY | Facility: HOSPITAL | Age: 57
End: 2022-10-11

## 2022-10-11 ENCOUNTER — LABORATORY RESULT (OUTPATIENT)
Age: 57
End: 2022-10-11

## 2022-10-11 ENCOUNTER — RESULT REVIEW (OUTPATIENT)
Age: 57
End: 2022-10-11

## 2022-10-11 VITALS
DIASTOLIC BLOOD PRESSURE: 88 MMHG | TEMPERATURE: 97.2 F | HEART RATE: 92 BPM | HEIGHT: 73.54 IN | SYSTOLIC BLOOD PRESSURE: 135 MMHG | RESPIRATION RATE: 16 BRPM | BODY MASS INDEX: 33.09 KG/M2 | OXYGEN SATURATION: 97 % | WEIGHT: 255.07 LBS

## 2022-10-11 LAB
BUN SERPL-MCNC: 41 MG/DL — HIGH (ref 7–23)
CA-I BLDA-SCNC: 1.23 MMOL/L — SIGNIFICANT CHANGE UP (ref 1.12–1.3)
CHLORIDE SERPL-SCNC: 113 MMOL/L — HIGH (ref 96–108)
CO2 SERPL-SCNC: 20 MMOL/L — LOW (ref 22–31)
CREAT SERPL-MCNC: 3.4 MG/DL — HIGH (ref 0.5–1.3)
GLUCOSE SERPL-MCNC: 139 MG/DL — HIGH (ref 70–99)
POTASSIUM SERPL-MCNC: 4.4 MMOL/L — SIGNIFICANT CHANGE UP (ref 3.5–5.3)
POTASSIUM SERPL-SCNC: 4.4 MMOL/L — SIGNIFICANT CHANGE UP (ref 3.5–5.3)
SODIUM SERPL-SCNC: 143 MMOL/L — SIGNIFICANT CHANGE UP (ref 135–145)

## 2022-10-11 PROCEDURE — 99214 OFFICE O/P EST MOD 30 MIN: CPT

## 2022-10-12 DIAGNOSIS — Z51.11 ENCOUNTER FOR ANTINEOPLASTIC CHEMOTHERAPY: ICD-10-CM

## 2022-10-12 LAB
ALBUMIN SERPL ELPH-MCNC: 3.8 G/DL — SIGNIFICANT CHANGE UP (ref 3.3–5)
ALP SERPL-CCNC: 94 U/L — SIGNIFICANT CHANGE UP (ref 40–120)
ALT FLD-CCNC: 28 U/L — SIGNIFICANT CHANGE UP (ref 10–45)
ANION GAP SERPL CALC-SCNC: 17 MMOL/L — SIGNIFICANT CHANGE UP (ref 5–17)
AST SERPL-CCNC: 31 U/L — SIGNIFICANT CHANGE UP (ref 10–40)
BILIRUB SERPL-MCNC: 0.3 MG/DL — SIGNIFICANT CHANGE UP (ref 0.2–1.2)
BUN SERPL-MCNC: 44 MG/DL — HIGH (ref 7–23)
CALCIUM SERPL-MCNC: 8.9 MG/DL — SIGNIFICANT CHANGE UP (ref 8.4–10.5)
CHLORIDE SERPL-SCNC: 110 MMOL/L — HIGH (ref 96–108)
CO2 SERPL-SCNC: 16 MMOL/L — LOW (ref 22–31)
CREAT SERPL-MCNC: 3.14 MG/DL — HIGH (ref 0.5–1.3)
EGFR: 22 ML/MIN/1.73M2 — LOW
GLUCOSE SERPL-MCNC: 121 MG/DL — HIGH (ref 70–99)
POTASSIUM SERPL-MCNC: 5.5 MMOL/L — HIGH (ref 3.5–5.3)
POTASSIUM SERPL-SCNC: 5.5 MMOL/L — HIGH (ref 3.5–5.3)
PROT SERPL-MCNC: 6.7 G/DL — SIGNIFICANT CHANGE UP (ref 6–8.3)
SODIUM SERPL-SCNC: 143 MMOL/L — SIGNIFICANT CHANGE UP (ref 135–145)

## 2022-10-12 NOTE — PHYSICAL EXAM
[Restricted in physically strenuous activity but ambulatory and able to carry out work of a light or sedentary nature] : Status 1- Restricted in physically strenuous activity but ambulatory and able to carry out work of a light or sedentary nature, e.g., light house work, office work [Normal] : affect appropriate [de-identified] : anicteric  [de-identified] : trace edema of BLEs [de-identified] : left knee swelling with palpable fluid collection
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
## 2022-10-12 NOTE — ASSESSMENT
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 Nivolumab started 4/19/22, start of cabozantinib delayed to 5/2/22 d/t insurance issues. \par \par Met RCC:\par - Cabozantinib 40mg daily + nivolumab cycle 7 today. Tolerating well with no significant AEs. Potential side effects of treatment reviewed again today. \par - 9/3/22 PET scan shows stable to improvement in disease, plan to continue current treatment. \par \par Left knee pain:\par - He went to the [facility] ED last week 10/4 for sudden onset of left leg swelling and pain of left knee, Xray of the left knee showed moderate to large knee effusion. BLE Doppler US showed subacute to chronic extensive BLE DVT, overall improved compared to Nov 2021. He was discharged from the ED, he saw his PCP who started him on a steroid pill (does not recall the name) around 10/5 which he is scheduled to finish at the end of this week. Since then the left knee pain and swelling are somewhat improved but continues to have intermittent pain. Max pain is 5/10, manageable with PRN Tylenol. \par - Will refer to ortho for further eval and management, he likely needs drainage of the fluid collection. \par \par Tooth infection:\par - Reportedly has an infection of his top front tooth, he was prescribed amoxicillin by his dentist and advised that he needs a tooth extraction. I discussed the r/o improper wound healing while on TKIs and reiterated the need to hold cabozantinib before and after any invasive procedures. \par - I previously spoke with pt's dentist Dr. French [phone]. The tooth is severely infected and unlikely to respond to antibiotics alone, he believes the tooth will need to be extracted though it does not have to be done urgently. Pt will have to hold cabozantinib 1wk before and after the extraction. \par - No significant tooth pain at present, will continue to monitor closely for now. \par \par CKD:\par - Baseline Cr is 2.89 - 3.46\par - 9/30/22 Cr = 3.72, repeat iSTAT today shows Cr = 3.4\par \par Instructed to contact our office with any new/worsening symptoms.\par Pt educated regarding plan of care, all questions/concerns addressed to the best of my abilities and his apparent satisfaction.\par F/u in 4wks for provider visit + nivo cycle 8. He will have labs done at [facility] the Friday before his tx appt.

## 2022-10-12 NOTE — REVIEW OF SYSTEMS
[Lower Ext Edema] : lower extremity edema [Joint Pain] : joint pain [Joint Stiffness] : joint stiffness [Dizziness] : dizziness [Fever] : no fever [Chills] : no chills [Night Sweats] : no night sweats [Fatigue] : no fatigue [Recent Change In Weight] : ~T no recent weight change [Eye Pain] : no eye pain [Vision Problems] : no vision problems [Dysphagia] : no dysphagia [Odynophagia] : no odynophagia [Mucosal Pain] : no mucosal pain [Chest Pain] : no chest pain [Palpitations] : no palpitations [Shortness Of Breath] : no shortness of breath [Cough] : no cough [Abdominal Pain] : no abdominal pain [Vomiting] : no vomiting [Constipation] : no constipation [Diarrhea] : no diarrhea [Dysuria] : no dysuria [Incontinence] : no incontinence [Muscle Pain] : no muscle pain [Skin Rash] : no skin rash [Anxiety] : no anxiety [Depression] : no depression [Hot Flashes] : no hot flashes [Muscle Weakness] : no muscle weakness [Easy Bleeding] : no tendency for easy bleeding [Easy Bruising] : no tendency for easy bruising

## 2022-10-13 NOTE — ED ADULT NURSE NOTE - BREATH SOUNDS, MLM
Detail Level: Detailed Add 26590 Cpt? (Important Note: In 2017 The Use Of 37173 Is Being Tracked By Cms To Determine Future Global Period Reimbursement For Global Periods): no Wound Evaluated By: German Evangelista Clear

## 2022-10-28 ENCOUNTER — APPOINTMENT (OUTPATIENT)
Dept: HEMATOLOGY ONCOLOGY | Facility: CLINIC | Age: 57
End: 2022-10-28

## 2022-11-01 ENCOUNTER — APPOINTMENT (OUTPATIENT)
Dept: HEMATOLOGY ONCOLOGY | Facility: CLINIC | Age: 57
End: 2022-11-01

## 2022-11-01 ENCOUNTER — APPOINTMENT (OUTPATIENT)
Dept: INFUSION THERAPY | Facility: HOSPITAL | Age: 57
End: 2022-11-01

## 2022-11-04 ENCOUNTER — RESULT REVIEW (OUTPATIENT)
Age: 57
End: 2022-11-04

## 2022-11-04 ENCOUNTER — APPOINTMENT (OUTPATIENT)
Dept: HEMATOLOGY ONCOLOGY | Facility: CLINIC | Age: 57
End: 2022-11-04

## 2022-11-04 LAB
BASOPHILS # BLD AUTO: 0.03 K/UL — SIGNIFICANT CHANGE UP (ref 0–0.2)
BASOPHILS NFR BLD AUTO: 0.7 % — SIGNIFICANT CHANGE UP (ref 0–2)
EOSINOPHIL # BLD AUTO: 0.13 K/UL — SIGNIFICANT CHANGE UP (ref 0–0.5)
EOSINOPHIL NFR BLD AUTO: 3.2 % — SIGNIFICANT CHANGE UP (ref 0–6)
HCT VFR BLD CALC: 40 % — SIGNIFICANT CHANGE UP (ref 39–50)
HGB BLD-MCNC: 12.8 G/DL — LOW (ref 13–17)
IMM GRANULOCYTES NFR BLD AUTO: 0.2 % — SIGNIFICANT CHANGE UP (ref 0–0.9)
LYMPHOCYTES # BLD AUTO: 1.19 K/UL — SIGNIFICANT CHANGE UP (ref 1–3.3)
LYMPHOCYTES # BLD AUTO: 29.6 % — SIGNIFICANT CHANGE UP (ref 13–44)
MCHC RBC-ENTMCNC: 32 G/DL — SIGNIFICANT CHANGE UP (ref 32–36)
MCHC RBC-ENTMCNC: 35.1 PG — HIGH (ref 27–34)
MCV RBC AUTO: 109.6 FL — HIGH (ref 80–100)
MONOCYTES # BLD AUTO: 0.33 K/UL — SIGNIFICANT CHANGE UP (ref 0–0.9)
MONOCYTES NFR BLD AUTO: 8.2 % — SIGNIFICANT CHANGE UP (ref 2–14)
NEUTROPHILS # BLD AUTO: 2.33 K/UL — SIGNIFICANT CHANGE UP (ref 1.8–7.4)
NEUTROPHILS NFR BLD AUTO: 58.1 % — SIGNIFICANT CHANGE UP (ref 43–77)
NRBC # BLD: 0 /100 WBCS — SIGNIFICANT CHANGE UP (ref 0–0)
PLATELET # BLD AUTO: 209 K/UL — SIGNIFICANT CHANGE UP (ref 150–400)
RBC # BLD: 3.65 M/UL — LOW (ref 4.2–5.8)
RBC # FLD: 15.9 % — HIGH (ref 10.3–14.5)
WBC # BLD: 4.02 K/UL — SIGNIFICANT CHANGE UP (ref 3.8–10.5)
WBC # FLD AUTO: 4.02 K/UL — SIGNIFICANT CHANGE UP (ref 3.8–10.5)

## 2022-11-05 LAB
ALBUMIN SERPL ELPH-MCNC: 3.6 G/DL
ALP BLD-CCNC: 106 U/L
ALT SERPL-CCNC: 35 U/L
ANION GAP SERPL CALC-SCNC: 12 MMOL/L
AST SERPL-CCNC: 47 U/L
BILIRUB SERPL-MCNC: 0.5 MG/DL
BUN SERPL-MCNC: 31 MG/DL
CALCIUM SERPL-MCNC: 8.3 MG/DL
CHLORIDE SERPL-SCNC: 114 MMOL/L
CO2 SERPL-SCNC: 18 MMOL/L
CREAT SERPL-MCNC: 3.56 MG/DL
EGFR: 19 ML/MIN/1.73M2
GLUCOSE SERPL-MCNC: 95 MG/DL
LDH SERPL-CCNC: 440 U/L
POTASSIUM SERPL-SCNC: 5.7 MMOL/L
PROT SERPL-MCNC: 6.3 G/DL
SODIUM SERPL-SCNC: 143 MMOL/L
T4 FREE SERPL-MCNC: 1 NG/DL
TSH SERPL-ACNC: 4.68 UIU/ML

## 2022-11-08 ENCOUNTER — RESULT REVIEW (OUTPATIENT)
Age: 57
End: 2022-11-08

## 2022-11-08 ENCOUNTER — APPOINTMENT (OUTPATIENT)
Dept: HEMATOLOGY ONCOLOGY | Facility: CLINIC | Age: 57
End: 2022-11-08

## 2022-11-08 ENCOUNTER — APPOINTMENT (OUTPATIENT)
Dept: INFUSION THERAPY | Facility: HOSPITAL | Age: 57
End: 2022-11-08

## 2022-11-08 VITALS
HEART RATE: 83 BPM | BODY MASS INDEX: 33.01 KG/M2 | WEIGHT: 253.97 LBS | SYSTOLIC BLOOD PRESSURE: 144 MMHG | TEMPERATURE: 97.5 F | RESPIRATION RATE: 16 BRPM | OXYGEN SATURATION: 97 % | DIASTOLIC BLOOD PRESSURE: 101 MMHG

## 2022-11-08 VITALS — DIASTOLIC BLOOD PRESSURE: 98 MMHG | SYSTOLIC BLOOD PRESSURE: 136 MMHG

## 2022-11-08 LAB
ANION GAP SERPL CALC-SCNC: 13 MMOL/L — SIGNIFICANT CHANGE UP (ref 5–17)
BUN SERPL-MCNC: 31 MG/DL — HIGH (ref 7–23)
CALCIUM SERPL-MCNC: 8.4 MG/DL — SIGNIFICANT CHANGE UP (ref 8.4–10.5)
CHLORIDE SERPL-SCNC: 110 MMOL/L — HIGH (ref 96–108)
CO2 SERPL-SCNC: 18 MMOL/L — LOW (ref 22–31)
CREAT SERPL-MCNC: 3.46 MG/DL — HIGH (ref 0.5–1.3)
EGFR: 20 ML/MIN/1.73M2 — LOW
GLUCOSE SERPL-MCNC: 82 MG/DL — SIGNIFICANT CHANGE UP (ref 70–99)
POTASSIUM SERPL-MCNC: 5.7 MMOL/L — HIGH (ref 3.5–5.3)
POTASSIUM SERPL-SCNC: 5.7 MMOL/L — HIGH (ref 3.5–5.3)
SODIUM SERPL-SCNC: 141 MMOL/L — SIGNIFICANT CHANGE UP (ref 135–145)

## 2022-11-08 PROCEDURE — 99214 OFFICE O/P EST MOD 30 MIN: CPT

## 2022-11-11 RX ORDER — CHLORTHALIDONE 25 MG/1
25 TABLET ORAL DAILY
Qty: 30 | Refills: 0 | Status: ACTIVE | COMMUNITY
Start: 2022-11-11 | End: 1900-01-01
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
## 2022-11-19 NOTE — PHYSICAL EXAM
[Fully active, able to carry on all pre-disease performance without restriction] : Status 0 - Fully active, able to carry on all pre-disease performance without restriction [Normal] : affect appropriate [de-identified] : anicteric  [de-identified] : no edema
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
## 2022-11-19 NOTE — REVIEW OF SYSTEMS
[Joint Pain] : joint pain [Fever] : no fever [Chills] : no chills [Night Sweats] : no night sweats [Fatigue] : no fatigue [Recent Change In Weight] : ~T no recent weight change [Eye Pain] : no eye pain [Vision Problems] : no vision problems [Dysphagia] : no dysphagia [Odynophagia] : no odynophagia [Mucosal Pain] : no mucosal pain [Chest Pain] : no chest pain [Palpitations] : no palpitations [Lower Ext Edema] : no lower extremity edema [Shortness Of Breath] : no shortness of breath [Cough] : no cough [Abdominal Pain] : no abdominal pain [Vomiting] : no vomiting [Constipation] : no constipation [Diarrhea] : no diarrhea [Dysuria] : no dysuria [Incontinence] : no incontinence [Joint Stiffness] : no joint stiffness [Muscle Pain] : no muscle pain [Skin Rash] : no skin rash [Dizziness] : no dizziness [Hot Flashes] : no hot flashes [Muscle Weakness] : no muscle weakness [Easy Bleeding] : no tendency for easy bleeding [Easy Bruising] : no tendency for easy bruising

## 2022-11-19 NOTE — ASSESSMENT
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 Nivolumab started 4/19/22, start of cabozantinib delayed to 5/2/22 d/t insurance issues. \par \par Met RCC:\par - Cabozantinib 40mg daily + nivolumab cycle 8 today. Tolerating well with no significant AEs. Potential side effects of treatment reviewed again today. \par - 9/3/22 PET scan shows stable to improvement in disease, plan to continue current treatment. \par - Repeat scans in Dec 2023\par \par HTN:\par - BP today is 144/101, asymptomatic. Repeat BP at end of visit was 136/98. He is due for his evening dose of hydralazine soon. \par - I reiterated that cabozantinib can cause/worsen HTN and uncontrolled HTN can increase r/o heart attack and stroke. He has a BP cuff at home, instructed to check BP daily and contact the office of SBP >160 and/or DBP >90. \par - His PCP is Dr. Venkatesh Hughes, I attempted to call the office this evening  but they were closed. I will reach out again tomorrow. \par \par Tooth infection:\par - Reportedly has an infection of his top front tooth, his dentist advised that he needs a tooth extraction. I discussed the r/o improper wound healing while on TKIs and reiterated the need to hold cabozantinib before and after any invasive procedures. I previously spoke with pt's dentist Dr. French [phone]. The tooth is severely infected and unlikely to respond to antibiotics alone, he believes the tooth will need to be extracted though it does not have to be done urgently. Pt will have to hold cabozantinib 1wk before and after the extraction at that time. \par - No tooth pain at present, will continue to monitor closely for now. \par \par CKD:\par - Baseline Cr is 2.89 - 3.46\par - 11/4/22 Cr = 3.56, relatively stable however will repeat BMP today to f/u hemolyzed K of 5.7\par \par Instructed to contact our office with any new/worsening symptoms.\par Pt educated regarding plan of care, all questions/concerns addressed to the best of my abilities and his apparent satisfaction.\par F/u in 4wks for provider visit + nivo cycle 9. He will have labs done at [facility] 1-2 days before this visit.

## 2022-11-25 ENCOUNTER — APPOINTMENT (OUTPATIENT)
Dept: HEMATOLOGY ONCOLOGY | Facility: CLINIC | Age: 57
End: 2022-11-25

## 2022-11-29 ENCOUNTER — APPOINTMENT (OUTPATIENT)
Dept: INFUSION THERAPY | Facility: HOSPITAL | Age: 57
End: 2022-11-29

## 2022-11-29 ENCOUNTER — APPOINTMENT (OUTPATIENT)
Dept: HEMATOLOGY ONCOLOGY | Facility: CLINIC | Age: 57
End: 2022-11-29

## 2022-12-01 ENCOUNTER — OUTPATIENT (OUTPATIENT)
Dept: OUTPATIENT SERVICES | Facility: HOSPITAL | Age: 57
LOS: 1 days | Discharge: HOME | End: 2022-12-01

## 2022-12-01 DIAGNOSIS — C64.9 MALIGNANT NEOPLASM OF UNSPECIFIED KIDNEY, EXCEPT RENAL PELVIS: ICD-10-CM

## 2022-12-01 DIAGNOSIS — Z98.89 OTHER SPECIFIED POSTPROCEDURAL STATES: Chronic | ICD-10-CM

## 2022-12-01 DIAGNOSIS — Z90.5 ACQUIRED ABSENCE OF KIDNEY: Chronic | ICD-10-CM

## 2022-12-06 ENCOUNTER — RESULT REVIEW (OUTPATIENT)
Age: 57
End: 2022-12-06

## 2022-12-06 ENCOUNTER — APPOINTMENT (OUTPATIENT)
Dept: HEMATOLOGY ONCOLOGY | Facility: CLINIC | Age: 57
End: 2022-12-06

## 2022-12-06 LAB
BASOPHILS # BLD AUTO: 0.03 K/UL — SIGNIFICANT CHANGE UP (ref 0–0.2)
BASOPHILS NFR BLD AUTO: 0.6 % — SIGNIFICANT CHANGE UP (ref 0–2)
EOSINOPHIL # BLD AUTO: 0.17 K/UL — SIGNIFICANT CHANGE UP (ref 0–0.5)
EOSINOPHIL NFR BLD AUTO: 3.5 % — SIGNIFICANT CHANGE UP (ref 0–6)
HCT VFR BLD CALC: 39.3 % — SIGNIFICANT CHANGE UP (ref 39–50)
HGB BLD-MCNC: 12.7 G/DL — LOW (ref 13–17)
IMM GRANULOCYTES NFR BLD AUTO: 0.2 % — SIGNIFICANT CHANGE UP (ref 0–0.9)
LYMPHOCYTES # BLD AUTO: 1.14 K/UL — SIGNIFICANT CHANGE UP (ref 1–3.3)
LYMPHOCYTES # BLD AUTO: 23.8 % — SIGNIFICANT CHANGE UP (ref 13–44)
MCHC RBC-ENTMCNC: 32.3 G/DL — SIGNIFICANT CHANGE UP (ref 32–36)
MCHC RBC-ENTMCNC: 35.3 PG — HIGH (ref 27–34)
MCV RBC AUTO: 109.2 FL — HIGH (ref 80–100)
MONOCYTES # BLD AUTO: 0.39 K/UL — SIGNIFICANT CHANGE UP (ref 0–0.9)
MONOCYTES NFR BLD AUTO: 8.1 % — SIGNIFICANT CHANGE UP (ref 2–14)
NEUTROPHILS # BLD AUTO: 3.05 K/UL — SIGNIFICANT CHANGE UP (ref 1.8–7.4)
NEUTROPHILS NFR BLD AUTO: 63.8 % — SIGNIFICANT CHANGE UP (ref 43–77)
NRBC # BLD: 0 /100 WBCS — SIGNIFICANT CHANGE UP (ref 0–0)
PLATELET # BLD AUTO: 251 K/UL — SIGNIFICANT CHANGE UP (ref 150–400)
RBC # BLD: 3.6 M/UL — LOW (ref 4.2–5.8)
RBC # FLD: 15.3 % — HIGH (ref 10.3–14.5)
T4 FREE SERPL-MCNC: 1.1 NG/DL
TSH SERPL-ACNC: 3.81 UIU/ML
WBC # BLD: 4.79 K/UL — SIGNIFICANT CHANGE UP (ref 3.8–10.5)
WBC # FLD AUTO: 4.79 K/UL — SIGNIFICANT CHANGE UP (ref 3.8–10.5)

## 2022-12-07 LAB
ALBUMIN SERPL ELPH-MCNC: 3.8 G/DL
ALP BLD-CCNC: 323 U/L
ALT SERPL-CCNC: 164 U/L
ANION GAP SERPL CALC-SCNC: 15 MMOL/L
AST SERPL-CCNC: 185 U/L
BILIRUB SERPL-MCNC: 0.4 MG/DL
BUN SERPL-MCNC: 39 MG/DL
CALCIUM SERPL-MCNC: 8.4 MG/DL
CHLORIDE SERPL-SCNC: 109 MMOL/L
CO2 SERPL-SCNC: 18 MMOL/L
CREAT SERPL-MCNC: 3.44 MG/DL
CREAT SPEC-SCNC: 117 MG/DL
CREAT/PROT UR: 1.1 RATIO
EGFR: 20 ML/MIN/1.73M2
GLUCOSE SERPL-MCNC: 141 MG/DL
LDH SERPL-CCNC: 345 U/L
POTASSIUM SERPL-SCNC: 4.8 MMOL/L
PROT SERPL-MCNC: 6.6 G/DL
PROT UR-MCNC: 127 MG/DL
SODIUM SERPL-SCNC: 142 MMOL/L

## 2022-12-08 ENCOUNTER — APPOINTMENT (OUTPATIENT)
Dept: INFUSION THERAPY | Facility: HOSPITAL | Age: 57
End: 2022-12-08

## 2022-12-08 ENCOUNTER — APPOINTMENT (OUTPATIENT)
Dept: HEMATOLOGY ONCOLOGY | Facility: CLINIC | Age: 57
End: 2022-12-08

## 2022-12-08 VITALS
DIASTOLIC BLOOD PRESSURE: 89 MMHG | BODY MASS INDEX: 32.24 KG/M2 | WEIGHT: 248.02 LBS | TEMPERATURE: 96.4 F | SYSTOLIC BLOOD PRESSURE: 130 MMHG | OXYGEN SATURATION: 98 % | HEART RATE: 90 BPM | RESPIRATION RATE: 16 BRPM

## 2022-12-08 PROCEDURE — 99214 OFFICE O/P EST MOD 30 MIN: CPT

## 2022-12-08 RX ORDER — PREDNISONE 10 MG/1
10 TABLET ORAL
Qty: 7 | Refills: 0 | Status: DISCONTINUED | COMMUNITY
Start: 2022-10-07

## 2022-12-08 NOTE — ASSESSMENT
[Palliative Care Plan] : not applicable at this time [FreeTextEntry1] : Natanael Yang is seen in the office in follow-up today.  He was due for cycle #9 of nivolumab and cabozantinib for his metastatic renal cell carcinoma.  His last imaging studies was a PET scan in September 2022.  He states that he feels "pretty good, not bad".  He notes no abdominal pains.  There is no cough or shortness of breath.  He denies any fatigue.  There were no headaches dizziness or balance issues.  He walks for exercise and also does some sit ups as well along with other exercises.  No fevers or chills.  Urine flow is good.  There is no incontinence hematuria or dysuria.  Nocturia occurs 2 or 3 times.  There were no GI complaints.  His appetite is good but he lost a few pounds recently.  There is no edema.  He has no chest pain chest pressure or palpitations.  He has an occasional pain in his right shoulder.  There is no palmar plantar erythrodysesthesia.  There is no mucositis or disclosure.  Nivolumab started on April 19 and cabozantinib was added on May 2 after clearance from his insurance company.\par \par On physical examination, he appears well and in no acute distress.  His blood pressure was 130/89 and his weight was 112.5 kg.  He was 115.2 on November 8.  His performance status is 0.  There were no palpable lymph nodes present.  The chest is clear and the heart examination is normal.  There is no edema in extremities.  The abdominal examination is normal.  There is no spinal column or chest wall tenderness to palpation or percussion.\par \par Laboratory values were obtained 2 days ago.  His white blood cell count was 4.8 with a hemoglobin value of 12.7 grams.  The platelet count was 251.  The MCV was 109.2, likely secondary to the cabozantinib.  His TSH was 3.81 and the free T4 was 1.1.  He did have some proteinuria with 127 mg/dL of protein and a spot urine.  The chemistry panel revealed a BUN of 39 with a creatinine 3.44, which is more or less his baseline.  The AST was 185 and the ALT was 164.  The alkaline phosphatase was also elevated, and this was new.  The bilirubin was 0.4.\par \par I explained to him that he has grade 2 transaminitis and is likely secondary to the nivolumab.  He will have repeat blood work done today, and I scheduled repeat blood work for him again next week.  I explained that if the liver enzymes get worse he may need to be treated with prednisone, and for the time being treatment will be suspended with nivolumab.  In the event that they promptly decline to baseline, he may be able to resume treatment in the future.\par \par All questions were answered to the best of my ability and to his apparent satisfaction.  We will see what his transaminase levels are going forward and to see whether or not he needs to be treated with prednisone for immune checkpoint inhibitor hepatitis

## 2022-12-08 NOTE — REVIEW OF SYSTEMS
[Joint Pain] : joint pain [Negative] : Gastrointestinal [Fever] : no fever [Chills] : no chills [Fatigue] : no fatigue [Recent Change In Weight] : ~T no recent weight change [Chest Pain] : no chest pain [Palpitations] : no palpitations [Lower Ext Edema] : no lower extremity edema [Wheezing] : no wheezing [Cough] : no cough [SOB on Exertion] : no shortness of breath during exertion [Dysuria] : no dysuria [Incontinence] : no incontinence [Skin Rash] : no skin rash [Dizziness] : no dizziness [Anxiety] : no anxiety [Depression] : no depression [Muscle Weakness] : no muscle weakness [Easy Bleeding] : no tendency for easy bleeding [Easy Bruising] : no tendency for easy bruising [FreeTextEntry9] : right  shoaulder, leg cramps on occasion.  [de-identified] : no pruritus [de-identified] : No HA, no paresthesias of note

## 2022-12-08 NOTE — RESULTS/DATA
[FreeTextEntry1] : EXAM: [id number] - PETCT SK-[facility] ONC FDG SUBS - ORDERED BY: KEERTHI FERREIRA\par PROCEDURE DATE: 09/03/2022\par INTERPRETATION: PROCEDURE: PET/CT SKULL BASE-MID THIGH IMAGING\par CLINICAL INFORMATION: 57-year-old male with recurrent metastatic renal cell cancer, on treatment with carbozanitib and nivolumab. PET/CT is done as part of subsequent treatment strategy evaluation.\par \par RADIOPHARMACEUTICAL: 13.81 mCi F-18, FDG, I.V.\par \par TECHNIQUE: Fasting blood sugar measured prior to injection of radiopharmaceutical was 95 mg/dl. Following intravenous injection of the radiopharmaceutical and an uptake period of approximately 60 minutes, FDG-PET/CT was obtained on a [employer] 710 scanner from skull base to mid thigh. Oral contrast was given during the uptake period. CT was performed during shallow respiration. The CT protocol was optimized for PET attenuation correction and anatomic localization. The CT protocol was not designed to produce and cannot replace state-of-the-art diagnostic CT images with specific imaging protocols for different body parts and indications. Images were reviewed on a dedicated workstation using multiplanar reconstruction.\par \par The standardized uptake values (SUV) are normalized to patient body weight and indicate the highest activity concentration (SUVmax) in a given disease site. All image numbers refer to axial image number.\par \par COMPARISON: PET/CT from 3/15/2022.\par \par OTHER STUDIES USED FOR CORRELATION: No interval related imaging studies.\par \par FINDINGS:\par \par HEAD/NECK: Physiologic FDG activity in visualized brain, head, and neck.\par \par CHEST: FDG avid subcarinal lymph node is minimally decreased in size but unchanged in avidity measuring 3.2 x 1.6 cm with SUV 6.6; image 97; previously 3.6 x 2.3 cm with SUV 5.5). Difficult to delineate left perihilar lymph node is unchanged in size and avidity measuring approximately 1.8 x 1.7 cm with SUV 4.7; image 98; previously 1.8 x 1.7 cm with SUV 4.4).\par \par LUNGS: Again seen are 3 left lower lobe pulmonary nodules which have decreased size and either resolved or decreased avidity. For reference, the largest, most FDG avid nodule located in the peripheral left lower lobe currently measures 1.7 x 1.4 cm with SUV 3.6 (image 115; previously 2 x 1.9 cm with SUV 5.7). The centrally located nodule measures 0.9 x 1 cm with SUV 1.2 (image 110; previously 1.4 x 1.3 cm with SUV 1.9). Interval resolution of FDG avidity associated with the superiorly located nodule measures 0.9 x 0.9 cm (previously 1.2 x 1.1 cm avid SUV 1.1). No new nodules. Biapical bullous disease and paraseptal emphysema, unchanged.\par \par PLEURA/PERICARDIUM: No abnormal FDG activity. No effusion.\par \par HEPATOBILIARY/PANCREAS: Physiologic FDG activity. Liver SUV mean is 2.5, previously 3.0. Unchanged course pancreatic head calcifications compatible with history of chronic pancreatitis.\par \par SPLEEN: Physiologic FDG activity. Normal in size.\par \par ADRENAL GLANDS: No abnormal FDG activity. Nodularity in the bilateral adrenal glands, unchanged.\par \par KIDNEYS/URINARY BLADDER: Status post right nephrectomy. Lobulated soft tissue in the right renal bed has minimally decreased in size but unchanged in avidity measuring 3.6 x 1.7 cm with SUV 6.6; image 159; previously 4.2 x 2.2 cm with SUV 5.1).\par \par Again seen are 4 FDG avid left renal masses which are either decreased or not significantly changed in size. The largest lesion, located in the anterior aspect of the left kidney measures 5.7 x 5.0 cm with central photopenia and peripheral FDG avidity with SUV 5.1 (image 163; previously 7.3 x 7.2 cm with peripheral FDG avidity with SUV 5.0). In the posterior aspect of the same level 4.7 x 4.6 cm mass is noted with new central photopenia and peripheral avidity with SUV 5.5 (image 163; previous 4.6 x 4.0 cm with SUV 5.7). The partially calcified, lobulated left upper pole exophytic lesion measures 3.0 x 1.9 cm with SUV 3.5 (image 146; previously 3.5 x 2.3 cm with SUV 5.9). The exophytic left lower pole mass measures approximately 4.4 x 4.1 cm with SUV 3.3 (image 178; previously 4.8 x 4.1 cm with SUV 3.9). Unchanged left renal staghorn calculus. No hydronephrosis.\par \par REPRODUCTIVE ORGANS: No abnormal FDG activity.\par \par ABDOMINOPELVIC NODES: FDG avid retroperitoneal lymphadenopathy is not significantly changed in size and avidity. Reference lesion located anterior to the right psoas muscle measures 3.8 x 1.5 cm with SUV 5.7 (image 188; previously 3.6 x 2.0 cm with SUV 5.8).\par \par BOWEL/PERITONEUM/MESENTERY: Multiple FDG avid omental nodules and masses are similar or mildly decreased or increased in size and avidity. Mass in the anterior left midabdomen measures approximately 5.6 x 4.3 cm with SUV 9.1 (image 167; previously 7.0 x 4.8 cm with SUV 6.7). A mass in the left lateral abdomen just anterior to the left kidney measures approximately 6.1 x 3.7 cm with SUV 9.3 (image 169; previously approximately 7.2 x 4.7 cm with SUV 6.6-remeasured).\par \par New diffuse gastric hypermetabolism (SUV 8.1; image 129) with questionable wall thickening on CT.\par \par BONES/No abnormal FDG activity.\par \par SOFT TISSUES: Nonspecific new mildly FDG avid subtle skin thickening in the anterior pelvic wall just to the left of midline (SUV 4.7; image 233).\par \par IMPRESSION: Compared to FDG-PET/CT scan dated 3/15/2022:\par \par 1. Multiple FDG avid left renal masses not significantly changed in size and avidity compatible with renal cell carcinoma. The posteriorly located mass demonstrates new central photopenia, likely representing necrosis. FDG avid lobulated soft tissue in the right renal bed is minimally decreased in size but not significantly changed in avidity.\par \par 2. FDG avid subcarinal, left perihilar, and retroperitoneal lymphadenopathy, either minimally increased or decreased in size and avidity. These are compatible with metastatic disease.\par \par 3. Multiple FDG avid omental nodules and masses, are minimally decreased in size but increased in avidity.\par \par 4. Left lower lobe pulmonary nodules are decreased in size in either resolved or decreased avidity.\par \par 5. New FDG avid questionable diffuse gastric wall thickening. Please correlate clinically for gastritis or with endoscopy further evaluation as indicated.\par \par 6. Nonspecific new mildly FDG avid subtle wall thickening in the anterior pelvic wall just to the left of the midline. Please correlate with direct visualization.\par \par --- End of Report ---\par JURGEN WRIGHT MD; Attending Nuclear Medicine\par This document has been electronically signed. Sep 5 2022 4:13PM
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
## 2022-12-08 NOTE — PHYSICAL EXAM
[Fully active, able to carry on all pre-disease performance without restriction] : Status 0 - Fully active, able to carry on all pre-disease performance without restriction [Normal] : affect appropriate [de-identified] : no edema

## 2022-12-15 ENCOUNTER — RESULT REVIEW (OUTPATIENT)
Age: 57
End: 2022-12-15

## 2022-12-15 ENCOUNTER — NON-APPOINTMENT (OUTPATIENT)
Age: 57
End: 2022-12-15

## 2022-12-15 ENCOUNTER — APPOINTMENT (OUTPATIENT)
Dept: HEMATOLOGY ONCOLOGY | Facility: CLINIC | Age: 57
End: 2022-12-15

## 2022-12-15 LAB
BASOPHILS # BLD AUTO: 0.03 K/UL — SIGNIFICANT CHANGE UP (ref 0–0.2)
BASOPHILS NFR BLD AUTO: 0.6 % — SIGNIFICANT CHANGE UP (ref 0–2)
EOSINOPHIL # BLD AUTO: 0.22 K/UL — SIGNIFICANT CHANGE UP (ref 0–0.5)
EOSINOPHIL NFR BLD AUTO: 4.8 % — SIGNIFICANT CHANGE UP (ref 0–6)
HCT VFR BLD CALC: 42.2 % — SIGNIFICANT CHANGE UP (ref 39–50)
HGB BLD-MCNC: 13.6 G/DL — SIGNIFICANT CHANGE UP (ref 13–17)
IMM GRANULOCYTES NFR BLD AUTO: 0.2 % — SIGNIFICANT CHANGE UP (ref 0–0.9)
LYMPHOCYTES # BLD AUTO: 1.23 K/UL — SIGNIFICANT CHANGE UP (ref 1–3.3)
LYMPHOCYTES # BLD AUTO: 26.6 % — SIGNIFICANT CHANGE UP (ref 13–44)
MCHC RBC-ENTMCNC: 32.2 G/DL — SIGNIFICANT CHANGE UP (ref 32–36)
MCHC RBC-ENTMCNC: 35.6 PG — HIGH (ref 27–34)
MCV RBC AUTO: 110.5 FL — HIGH (ref 80–100)
MONOCYTES # BLD AUTO: 0.38 K/UL — SIGNIFICANT CHANGE UP (ref 0–0.9)
MONOCYTES NFR BLD AUTO: 8.2 % — SIGNIFICANT CHANGE UP (ref 2–14)
NEUTROPHILS # BLD AUTO: 2.76 K/UL — SIGNIFICANT CHANGE UP (ref 1.8–7.4)
NEUTROPHILS NFR BLD AUTO: 59.6 % — SIGNIFICANT CHANGE UP (ref 43–77)
NRBC # BLD: 0 /100 WBCS — SIGNIFICANT CHANGE UP (ref 0–0)
PLATELET # BLD AUTO: 243 K/UL — SIGNIFICANT CHANGE UP (ref 150–400)
RBC # BLD: 3.82 M/UL — LOW (ref 4.2–5.8)
RBC # FLD: 15.7 % — HIGH (ref 10.3–14.5)
WBC # BLD: 4.63 K/UL — SIGNIFICANT CHANGE UP (ref 3.8–10.5)
WBC # FLD AUTO: 4.63 K/UL — SIGNIFICANT CHANGE UP (ref 3.8–10.5)

## 2022-12-16 ENCOUNTER — APPOINTMENT (OUTPATIENT)
Dept: INFUSION THERAPY | Facility: HOSPITAL | Age: 57
End: 2022-12-16

## 2022-12-16 ENCOUNTER — NON-APPOINTMENT (OUTPATIENT)
Age: 57
End: 2022-12-16

## 2022-12-16 LAB
ALBUMIN SERPL ELPH-MCNC: 4 G/DL
ALP BLD-CCNC: 446 U/L
ALT SERPL-CCNC: 289 U/L
ANION GAP SERPL CALC-SCNC: 15 MMOL/L
AST SERPL-CCNC: 316 U/L
BILIRUB SERPL-MCNC: 0.6 MG/DL
BUN SERPL-MCNC: 41 MG/DL
CALCIUM SERPL-MCNC: 8.6 MG/DL
CHLORIDE SERPL-SCNC: 108 MMOL/L
CO2 SERPL-SCNC: 20 MMOL/L
CREAT SERPL-MCNC: 3.49 MG/DL
EGFR: 20 ML/MIN/1.73M2
GLUCOSE SERPL-MCNC: 124 MG/DL
POTASSIUM SERPL-SCNC: 4.5 MMOL/L
PROT SERPL-MCNC: 6.9 G/DL
SODIUM SERPL-SCNC: 143 MMOL/L

## 2022-12-16 RX ORDER — FAMOTIDINE 10 MG/1
10 TABLET, FILM COATED ORAL DAILY
Qty: 30 | Refills: 5 | Status: ACTIVE | COMMUNITY
Start: 2022-12-16 | End: 1900-01-01

## 2022-12-20 ENCOUNTER — APPOINTMENT (OUTPATIENT)
Dept: HEMATOLOGY ONCOLOGY | Facility: CLINIC | Age: 57
End: 2022-12-20

## 2022-12-20 ENCOUNTER — RESULT REVIEW (OUTPATIENT)
Age: 57
End: 2022-12-20

## 2022-12-20 LAB
ALBUMIN SERPL ELPH-MCNC: 4 G/DL
ALP BLD-CCNC: 300 U/L
ALT SERPL-CCNC: 155 U/L
ANION GAP SERPL CALC-SCNC: 11 MMOL/L
AST SERPL-CCNC: 71 U/L
BASOPHILS # BLD AUTO: 0.01 K/UL — SIGNIFICANT CHANGE UP (ref 0–0.2)
BASOPHILS NFR BLD AUTO: 0.1 % — SIGNIFICANT CHANGE UP (ref 0–2)
BILIRUB SERPL-MCNC: 0.5 MG/DL
BUN SERPL-MCNC: 62 MG/DL
CALCIUM SERPL-MCNC: 8.5 MG/DL
CHLORIDE SERPL-SCNC: 106 MMOL/L
CO2 SERPL-SCNC: 26 MMOL/L
CREAT SERPL-MCNC: 3.43 MG/DL
EGFR: 20 ML/MIN/1.73M2
EOSINOPHIL # BLD AUTO: 0 K/UL — SIGNIFICANT CHANGE UP (ref 0–0.5)
EOSINOPHIL NFR BLD AUTO: 0 % — SIGNIFICANT CHANGE UP (ref 0–6)
GLUCOSE SERPL-MCNC: 151 MG/DL
HCT VFR BLD CALC: 41.3 % — SIGNIFICANT CHANGE UP (ref 39–50)
HGB BLD-MCNC: 13.2 G/DL — SIGNIFICANT CHANGE UP (ref 13–17)
IMM GRANULOCYTES NFR BLD AUTO: 1 % — HIGH (ref 0–0.9)
LYMPHOCYTES # BLD AUTO: 1.26 K/UL — SIGNIFICANT CHANGE UP (ref 1–3.3)
LYMPHOCYTES # BLD AUTO: 12.5 % — LOW (ref 13–44)
MCHC RBC-ENTMCNC: 32 G/DL — SIGNIFICANT CHANGE UP (ref 32–36)
MCHC RBC-ENTMCNC: 34.6 PG — HIGH (ref 27–34)
MCV RBC AUTO: 108.4 FL — HIGH (ref 80–100)
MONOCYTES # BLD AUTO: 0.9 K/UL — SIGNIFICANT CHANGE UP (ref 0–0.9)
MONOCYTES NFR BLD AUTO: 8.9 % — SIGNIFICANT CHANGE UP (ref 2–14)
NEUTROPHILS # BLD AUTO: 7.81 K/UL — HIGH (ref 1.8–7.4)
NEUTROPHILS NFR BLD AUTO: 77.5 % — HIGH (ref 43–77)
NRBC # BLD: 0 /100 WBCS — SIGNIFICANT CHANGE UP (ref 0–0)
PLATELET # BLD AUTO: 243 K/UL — SIGNIFICANT CHANGE UP (ref 150–400)
POTASSIUM SERPL-SCNC: 4.3 MMOL/L
PROT SERPL-MCNC: 6.4 G/DL
RBC # BLD: 3.81 M/UL — LOW (ref 4.2–5.8)
RBC # FLD: 15.5 % — HIGH (ref 10.3–14.5)
SODIUM SERPL-SCNC: 143 MMOL/L
WBC # BLD: 10.08 K/UL — SIGNIFICANT CHANGE UP (ref 3.8–10.5)
WBC # FLD AUTO: 10.08 K/UL — SIGNIFICANT CHANGE UP (ref 3.8–10.5)

## 2022-12-27 ENCOUNTER — APPOINTMENT (OUTPATIENT)
Dept: HEMATOLOGY ONCOLOGY | Facility: CLINIC | Age: 57
End: 2022-12-27

## 2022-12-27 ENCOUNTER — RESULT REVIEW (OUTPATIENT)
Age: 57
End: 2022-12-27

## 2022-12-27 VITALS
HEART RATE: 85 BPM | WEIGHT: 256.84 LBS | RESPIRATION RATE: 16 BRPM | BODY MASS INDEX: 33.39 KG/M2 | TEMPERATURE: 97.2 F | DIASTOLIC BLOOD PRESSURE: 83 MMHG | OXYGEN SATURATION: 97 % | SYSTOLIC BLOOD PRESSURE: 137 MMHG

## 2022-12-27 LAB
ALBUMIN SERPL ELPH-MCNC: 3.9 G/DL
ALP BLD-CCNC: 175 U/L
ALT SERPL-CCNC: 62 U/L
ANION GAP SERPL CALC-SCNC: 14 MMOL/L
AST SERPL-CCNC: 25 U/L
BASOPHILS # BLD AUTO: 0.02 K/UL — SIGNIFICANT CHANGE UP (ref 0–0.2)
BASOPHILS NFR BLD AUTO: 0.2 % — SIGNIFICANT CHANGE UP (ref 0–2)
BILIRUB SERPL-MCNC: 0.5 MG/DL
BUN SERPL-MCNC: 75 MG/DL
CALCIUM SERPL-MCNC: 8.1 MG/DL
CHLORIDE SERPL-SCNC: 108 MMOL/L
CO2 SERPL-SCNC: 16 MMOL/L
CREAT SERPL-MCNC: 3.35 MG/DL
EGFR: 21 ML/MIN/1.73M2
EOSINOPHIL # BLD AUTO: 0 K/UL — SIGNIFICANT CHANGE UP (ref 0–0.5)
EOSINOPHIL NFR BLD AUTO: 0 % — SIGNIFICANT CHANGE UP (ref 0–6)
GLUCOSE SERPL-MCNC: 164 MG/DL
HCT VFR BLD CALC: 43.3 % — SIGNIFICANT CHANGE UP (ref 39–50)
HGB BLD-MCNC: 14.1 G/DL — SIGNIFICANT CHANGE UP (ref 13–17)
IMM GRANULOCYTES NFR BLD AUTO: 1.2 % — HIGH (ref 0–0.9)
LDH SERPL-CCNC: 305 U/L
LYMPHOCYTES # BLD AUTO: 0.38 K/UL — LOW (ref 1–3.3)
LYMPHOCYTES # BLD AUTO: 3 % — LOW (ref 13–44)
MCHC RBC-ENTMCNC: 32.6 G/DL — SIGNIFICANT CHANGE UP (ref 32–36)
MCHC RBC-ENTMCNC: 35.4 PG — HIGH (ref 27–34)
MCV RBC AUTO: 108.8 FL — HIGH (ref 80–100)
MONOCYTES # BLD AUTO: 0.46 K/UL — SIGNIFICANT CHANGE UP (ref 0–0.9)
MONOCYTES NFR BLD AUTO: 3.6 % — SIGNIFICANT CHANGE UP (ref 2–14)
NEUTROPHILS # BLD AUTO: 11.73 K/UL — HIGH (ref 1.8–7.4)
NEUTROPHILS NFR BLD AUTO: 92 % — HIGH (ref 43–77)
NRBC # BLD: 0 /100 WBCS — SIGNIFICANT CHANGE UP (ref 0–0)
PLATELET # BLD AUTO: 212 K/UL — SIGNIFICANT CHANGE UP (ref 150–400)
POTASSIUM SERPL-SCNC: 5.5 MMOL/L
PROT SERPL-MCNC: 6.2 G/DL
RBC # BLD: 3.98 M/UL — LOW (ref 4.2–5.8)
RBC # FLD: 15.8 % — HIGH (ref 10.3–14.5)
SODIUM SERPL-SCNC: 138 MMOL/L
T4 FREE SERPL-MCNC: 1 NG/DL
TSH SERPL-ACNC: 0.88 UIU/ML
WBC # BLD: 12.74 K/UL — HIGH (ref 3.8–10.5)
WBC # FLD AUTO: 12.74 K/UL — HIGH (ref 3.8–10.5)

## 2022-12-27 PROCEDURE — 99214 OFFICE O/P EST MOD 30 MIN: CPT

## 2022-12-28 RX ORDER — SODIUM ZIRCONIUM CYCLOSILICATE 10 G/10G
10 POWDER, FOR SUSPENSION ORAL DAILY
Qty: 30 | Refills: 0 | Status: ACTIVE | COMMUNITY
Start: 1900-01-01 | End: 1900-01-01

## 2022-12-28 NOTE — ASSESSMENT
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 Nivolumab started 4/19/22, start of cabozantinib delayed to 5/2/22 d/t insurance issues. \par \par Met RCC:\par - 9/3/22 PET scan shows stable to improvement in disease.\par - S/p nivo cycle 8 on 11/8/22 and cabozantinib held as of 12/16/22 for grade 3 transaminitis.\par - Instructed to resume cabozantinib 40mg daily given significant improvement in LFTs as below. \par - Repeat scans due at this time, will order PET scan (ineligible for MRI d/t hx of brain aneurysm with clips that are likely not MRI compatible, ineligible for CT contrast d/t CKD). \par \par Transaminitis:\par - Grade 3 immunotherapy associated transaminitis\par - 12/15/22 AST = 316, ALT = 289, and alk phos = 446\par - Solumedrol 500mg IV given 12/16/22 and prednisone 50mg bid started 12/17/22. \par - 12/27/22 AST = 25, ALT = 62, alk phos = 175\par - Patient instructed to decrease prednisone to 50mg daily, will further taper at next visit as LFTs continue to improve. \par \par Tooth infection:\par - Reportedly has an infection of his top front tooth, his dentist advised that he needs a tooth extraction. I discussed the r/o improper wound healing while on TKIs and reiterated the need to hold cabozantinib before and after any invasive procedures. I previously spoke with pt's dentist Dr. French [phone]. The tooth is severely infected and unlikely to respond to antibiotics alone, he believes the tooth will need to be extracted though it does not have to be done urgently. Pt will have to hold cabozantinib 1wk before and after the extraction at that time. \par - No tooth pain at present, will continue to monitor closely for now. \par \par CKD:\par - Baseline Cr is 2.89 - 3.46\par - 12/27/22 Cr = 3.35, stable\par - 12/27/22 K = 5.5, he was previously prescribed Lokelma by his nephrologist but has not been taking it. Instructed to take Lokelma packet daily.\par - Continue f/u with renal. \par \par Instructed to contact our office with any new/worsening symptoms.\par Pt educated regarding plan of care, all questions/concerns addressed to the best of my abilities and his apparent satisfaction.\par F/u in 1wk.

## 2022-12-28 NOTE — PHYSICAL EXAM
[Fully active, able to carry on all pre-disease performance without restriction] : Status 0 - Fully active, able to carry on all pre-disease performance without restriction [Normal] : affect appropriate [de-identified] : anicteric  [de-identified] : no edema
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
## 2022-12-28 NOTE — REVIEW OF SYSTEMS
[Fever] : no fever [Chills] : no chills [Night Sweats] : no night sweats [Fatigue] : no fatigue [Recent Change In Weight] : ~T no recent weight change [Eye Pain] : no eye pain [Vision Problems] : no vision problems [Dysphagia] : no dysphagia [Odynophagia] : no odynophagia [Mucosal Pain] : no mucosal pain [Chest Pain] : no chest pain [Palpitations] : no palpitations [Lower Ext Edema] : no lower extremity edema [Shortness Of Breath] : no shortness of breath [Cough] : no cough [Abdominal Pain] : no abdominal pain [Vomiting] : no vomiting [Constipation] : no constipation [Diarrhea] : no diarrhea [Dysuria] : no dysuria [Incontinence] : no incontinence [Skin Rash] : no skin rash [Dizziness] : no dizziness [Hot Flashes] : no hot flashes [Muscle Weakness] : no muscle weakness [Easy Bleeding] : no tendency for easy bleeding [Easy Bruising] : no tendency for easy bruising

## 2023-01-03 ENCOUNTER — APPOINTMENT (OUTPATIENT)
Dept: HEMATOLOGY ONCOLOGY | Facility: CLINIC | Age: 58
End: 2023-01-03

## 2023-01-04 PROBLEM — E87.5 HYPERKALEMIA: Status: ACTIVE | Noted: 2022-12-28

## 2023-01-05 ENCOUNTER — RESULT REVIEW (OUTPATIENT)
Age: 58
End: 2023-01-05

## 2023-01-05 ENCOUNTER — APPOINTMENT (OUTPATIENT)
Dept: HEMATOLOGY ONCOLOGY | Facility: CLINIC | Age: 58
End: 2023-01-05
Payer: MEDICAID

## 2023-01-05 ENCOUNTER — APPOINTMENT (OUTPATIENT)
Dept: INFUSION THERAPY | Facility: HOSPITAL | Age: 58
End: 2023-01-05

## 2023-01-05 VITALS
HEART RATE: 77 BPM | DIASTOLIC BLOOD PRESSURE: 92 MMHG | RESPIRATION RATE: 16 BRPM | BODY MASS INDEX: 34.39 KG/M2 | OXYGEN SATURATION: 96 % | TEMPERATURE: 97 F | SYSTOLIC BLOOD PRESSURE: 154 MMHG | WEIGHT: 264.55 LBS

## 2023-01-05 VITALS — SYSTOLIC BLOOD PRESSURE: 142 MMHG | DIASTOLIC BLOOD PRESSURE: 92 MMHG

## 2023-01-05 DIAGNOSIS — E87.5 HYPERKALEMIA: ICD-10-CM

## 2023-01-05 LAB
BASOPHILS # BLD AUTO: 0.01 K/UL — SIGNIFICANT CHANGE UP (ref 0–0.2)
BASOPHILS NFR BLD AUTO: 0.1 % — SIGNIFICANT CHANGE UP (ref 0–2)
EOSINOPHIL # BLD AUTO: 0.01 K/UL — SIGNIFICANT CHANGE UP (ref 0–0.5)
EOSINOPHIL NFR BLD AUTO: 0.1 % — SIGNIFICANT CHANGE UP (ref 0–6)
HCT VFR BLD CALC: 43.2 % — SIGNIFICANT CHANGE UP (ref 39–50)
HGB BLD-MCNC: 14.1 G/DL — SIGNIFICANT CHANGE UP (ref 13–17)
IMM GRANULOCYTES NFR BLD AUTO: 0.5 % — SIGNIFICANT CHANGE UP (ref 0–0.9)
LYMPHOCYTES # BLD AUTO: 0.84 K/UL — LOW (ref 1–3.3)
LYMPHOCYTES # BLD AUTO: 8.7 % — LOW (ref 13–44)
MCHC RBC-ENTMCNC: 32.6 G/DL — SIGNIFICANT CHANGE UP (ref 32–36)
MCHC RBC-ENTMCNC: 35.1 PG — HIGH (ref 27–34)
MCV RBC AUTO: 107.5 FL — HIGH (ref 80–100)
MONOCYTES # BLD AUTO: 0.45 K/UL — SIGNIFICANT CHANGE UP (ref 0–0.9)
MONOCYTES NFR BLD AUTO: 4.6 % — SIGNIFICANT CHANGE UP (ref 2–14)
NEUTROPHILS # BLD AUTO: 8.32 K/UL — HIGH (ref 1.8–7.4)
NEUTROPHILS NFR BLD AUTO: 86 % — HIGH (ref 43–77)
NRBC # BLD: 0 /100 WBCS — SIGNIFICANT CHANGE UP (ref 0–0)
PLATELET # BLD AUTO: 130 K/UL — LOW (ref 150–400)
RBC # BLD: 4.02 M/UL — LOW (ref 4.2–5.8)
RBC # FLD: 15.6 % — HIGH (ref 10.3–14.5)
WBC # BLD: 9.68 K/UL — SIGNIFICANT CHANGE UP (ref 3.8–10.5)
WBC # FLD AUTO: 9.68 K/UL — SIGNIFICANT CHANGE UP (ref 3.8–10.5)

## 2023-01-05 PROCEDURE — 99213 OFFICE O/P EST LOW 20 MIN: CPT

## 2023-01-05 RX ORDER — NIVOLUMAB 10 MG/ML
100 INJECTION INTRAVENOUS
Refills: 0 | Status: DISCONTINUED | COMMUNITY
Start: 2022-06-14 | End: 2023-01-05

## 2023-01-05 NOTE — ASSESSMENT
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 Nivolumab started 4/19/22, start of cabozantinib delayed to 5/2/22 d/t insurance issues. \par \par Met RCC:\par - 9/3/22 PET scan shows stable to improvement in disease.\par - S/p nivo cycle 8 on 11/8/22 and cabozantinib held as of 12/16/22 for grade 3 transaminitis. Cabozantinib resumed 12/27/22. \par - Continue cabozantinib 40mg daily, tolerating well with no significant AEs. \par - Repeat scans due at this time, PET scan ordered at last visit (ineligible for MRI d/t hx of brain aneurysm with clips that are likely not MRI compatible, ineligible for CT contrast d/t CKD), awaiting auth. \par \par Transaminitis:\par - Grade 3 immunotherapy associated transaminitis: 12/15/22 AST = 316, ALT = 289, and alk phos = 446\par - Solumedrol 500mg IV given 12/16/22 and prednisone 50mg bid started 12/17/22. \par - 12/27/22 AST = 25, ALT = 62, alk phos = 175\par - Currently on prednisone 50mg daily. Repeat LFTs today pending. Instructed to decrease prednisone to 40mg daily. \par \par HTN:\par - /92, asymptomatic. Repeat BP at end of visit was 142/92. \par - Cabozantinib may worsen HTN. Instructed to monitor closely. \par \par Tooth infection:\par - Reportedly has an infection of his top front tooth, his dentist advised that he needs a tooth extraction. I discussed the r/o improper wound healing while on TKIs and reiterated the need to hold cabozantinib before and after any invasive procedures. I previously spoke with pt's dentist Dr. French [phone]. The tooth is severely infected and unlikely to respond to antibiotics alone, he believes the tooth will need to be extracted though it does not have to be done urgently. Pt will have to hold cabozantinib 1wk before and after the extraction at that time. \par - No tooth pain at present, will continue to monitor closely for now. \par \par CKD:\par - Baseline Cr is 2.89 - 3.46\par - 12/27/22 Cr = 3.35, stable\par - 12/27/22 K = 5.5, he was previously prescribed Lokelma by his nephrologist but has not been taking it. Instructed to take Lokelma packet daily. Repeat CMP today is pending\par - Continue f/u with renal. \par \par Instructed to contact our office with any new/worsening symptoms.\par Pt educated regarding plan of care, all questions/concerns addressed to the best of my abilities and his apparent satisfaction.\par F/u in 2wks.
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
## 2023-01-05 NOTE — PHYSICAL EXAM
[Fully active, able to carry on all pre-disease performance without restriction] : Status 0 - Fully active, able to carry on all pre-disease performance without restriction [Normal] : affect appropriate [de-identified] : anicteric  [de-identified] : trace edema of BLEs

## 2023-01-05 NOTE — REVIEW OF SYSTEMS
[Fever] : no fever [Chills] : no chills [Night Sweats] : no night sweats [Fatigue] : no fatigue [Eye Pain] : no eye pain [Vision Problems] : no vision problems [Dysphagia] : no dysphagia [Odynophagia] : no odynophagia [Mucosal Pain] : no mucosal pain [Chest Pain] : no chest pain [Palpitations] : no palpitations [Lower Ext Edema] : no lower extremity edema [Shortness Of Breath] : no shortness of breath [Cough] : no cough [Abdominal Pain] : no abdominal pain [Vomiting] : no vomiting [Constipation] : no constipation [Diarrhea] : no diarrhea [Dysuria] : no dysuria [Incontinence] : no incontinence [Joint Pain] : no joint pain [Joint Stiffness] : no joint stiffness [Muscle Pain] : no muscle pain [Skin Rash] : no skin rash [Dizziness] : no dizziness [Hot Flashes] : no hot flashes [Muscle Weakness] : no muscle weakness [Easy Bleeding] : no tendency for easy bleeding [Easy Bruising] : no tendency for easy bruising

## 2023-01-06 LAB
ALBUMIN SERPL ELPH-MCNC: 3.7 G/DL
ALP BLD-CCNC: 108 U/L
ALT SERPL-CCNC: 45 U/L
ANION GAP SERPL CALC-SCNC: 14 MMOL/L
AST SERPL-CCNC: 27 U/L
BILIRUB SERPL-MCNC: 0.6 MG/DL
BUN SERPL-MCNC: 58 MG/DL
CALCIUM SERPL-MCNC: 8.4 MG/DL
CHLORIDE SERPL-SCNC: 107 MMOL/L
CO2 SERPL-SCNC: 22 MMOL/L
CREAT SERPL-MCNC: 3.32 MG/DL
EGFR: 21 ML/MIN/1.73M2
GLUCOSE SERPL-MCNC: 110 MG/DL
POTASSIUM SERPL-SCNC: 4.2 MMOL/L
PROT SERPL-MCNC: 5.7 G/DL
SODIUM SERPL-SCNC: 142 MMOL/L

## 2023-01-13 PROBLEM — I10 HYPERTENSION, UNSPECIFIED TYPE: Status: ACTIVE | Noted: 2022-06-14

## 2023-01-18 ENCOUNTER — RESULT REVIEW (OUTPATIENT)
Age: 58
End: 2023-01-18

## 2023-01-18 ENCOUNTER — APPOINTMENT (OUTPATIENT)
Dept: HEMATOLOGY ONCOLOGY | Facility: CLINIC | Age: 58
End: 2023-01-18
Payer: MEDICAID

## 2023-01-18 VITALS
SYSTOLIC BLOOD PRESSURE: 136 MMHG | WEIGHT: 265.41 LBS | OXYGEN SATURATION: 98 % | BODY MASS INDEX: 34.5 KG/M2 | DIASTOLIC BLOOD PRESSURE: 96 MMHG | HEART RATE: 71 BPM | RESPIRATION RATE: 16 BRPM | TEMPERATURE: 98.4 F

## 2023-01-18 DIAGNOSIS — I10 ESSENTIAL (PRIMARY) HYPERTENSION: ICD-10-CM

## 2023-01-18 LAB
ALBUMIN SERPL ELPH-MCNC: 3.8 G/DL
ALP BLD-CCNC: 68 U/L
ALT SERPL-CCNC: 43 U/L
ANION GAP SERPL CALC-SCNC: 13 MMOL/L
ANISOCYTOSIS BLD QL: SLIGHT — SIGNIFICANT CHANGE UP
AST SERPL-CCNC: 34 U/L
BASOPHILS # BLD AUTO: 0 K/UL — SIGNIFICANT CHANGE UP (ref 0–0.2)
BASOPHILS NFR BLD AUTO: 0 % — SIGNIFICANT CHANGE UP (ref 0–2)
BILIRUB SERPL-MCNC: 0.7 MG/DL
BUN SERPL-MCNC: 55 MG/DL
BURR CELLS BLD QL SMEAR: PRESENT — SIGNIFICANT CHANGE UP
CALCIUM SERPL-MCNC: 8.1 MG/DL
CHLORIDE SERPL-SCNC: 104 MMOL/L
CO2 SERPL-SCNC: 22 MMOL/L
CREAT SERPL-MCNC: 3.5 MG/DL
EGFR: 20 ML/MIN/1.73M2
EOSINOPHIL # BLD AUTO: 0 K/UL — SIGNIFICANT CHANGE UP (ref 0–0.5)
EOSINOPHIL NFR BLD AUTO: 0 % — SIGNIFICANT CHANGE UP (ref 0–6)
GLUCOSE SERPL-MCNC: 101 MG/DL
HCT VFR BLD CALC: 44.4 % — SIGNIFICANT CHANGE UP (ref 39–50)
HGB BLD-MCNC: 14.3 G/DL — SIGNIFICANT CHANGE UP (ref 13–17)
LDH SERPL-CCNC: 509 U/L
LYMPHOCYTES # BLD AUTO: 1.3 K/UL — SIGNIFICANT CHANGE UP (ref 1–3.3)
LYMPHOCYTES # BLD AUTO: 23 % — SIGNIFICANT CHANGE UP (ref 13–44)
MCHC RBC-ENTMCNC: 32.2 G/DL — SIGNIFICANT CHANGE UP (ref 32–36)
MCHC RBC-ENTMCNC: 35.1 PG — HIGH (ref 27–34)
MCV RBC AUTO: 109.1 FL — HIGH (ref 80–100)
MONOCYTES # BLD AUTO: 0.23 K/UL — SIGNIFICANT CHANGE UP (ref 0–0.9)
MONOCYTES NFR BLD AUTO: 4 % — SIGNIFICANT CHANGE UP (ref 2–14)
NEUTROPHILS # BLD AUTO: 4.12 K/UL — SIGNIFICANT CHANGE UP (ref 1.8–7.4)
NEUTROPHILS NFR BLD AUTO: 73 % — SIGNIFICANT CHANGE UP (ref 43–77)
NRBC # BLD: 1 /100 — HIGH (ref 0–0)
NRBC # BLD: SIGNIFICANT CHANGE UP /100 WBCS (ref 0–0)
PLAT MORPH BLD: NORMAL — SIGNIFICANT CHANGE UP
PLATELET # BLD AUTO: 130 K/UL — LOW (ref 150–400)
POIKILOCYTOSIS BLD QL AUTO: SLIGHT — SIGNIFICANT CHANGE UP
POTASSIUM SERPL-SCNC: 3.8 MMOL/L
PROT SERPL-MCNC: 5.7 G/DL
RBC # BLD: 4.07 M/UL — LOW (ref 4.2–5.8)
RBC # FLD: 15.7 % — HIGH (ref 10.3–14.5)
RBC BLD AUTO: ABNORMAL
SODIUM SERPL-SCNC: 139 MMOL/L
T4 FREE SERPL-MCNC: 1 NG/DL
TSH SERPL-ACNC: 2.87 UIU/ML
WBC # BLD: 5.64 K/UL — SIGNIFICANT CHANGE UP (ref 3.8–10.5)
WBC # FLD AUTO: 5.64 K/UL — SIGNIFICANT CHANGE UP (ref 3.8–10.5)

## 2023-01-18 PROCEDURE — 99213 OFFICE O/P EST LOW 20 MIN: CPT

## 2023-01-18 RX ORDER — AMLODIPINE BESYLATE 10 MG/1
10 TABLET ORAL
Qty: 30 | Refills: 2 | Status: ACTIVE | COMMUNITY
Start: 1900-01-01 | End: 1900-01-01

## 2023-01-20 NOTE — PHYSICAL EXAM
[Fully active, able to carry on all pre-disease performance without restriction] : Status 0 - Fully active, able to carry on all pre-disease performance without restriction [Normal] : affect appropriate [de-identified] : anicteric  [de-identified] : +1 edema of BLEs

## 2023-01-20 NOTE — ASSESSMENT
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 Nivolumab started 4/19/22, start of cabozantinib delayed to 5/2/22 d/t insurance issues. \par \par Met RCC:\par - 9/3/22 PET scan shows stable to improvement in disease.\par - S/p nivo cycle 8 on 11/8/22 and cabozantinib held as of 12/16/22 for grade 3 transaminitis. Cabozantinib resumed 12/27/22. \par - Continue cabozantinib 40mg daily, tolerating well with no significant AEs. \par - Repeat scans due at this time, PET scan ordered at last visit (ineligible for MRI d/t hx of brain aneurysm with clips that are likely not MRI compatible, ineligible for CT contrast d/t CKD), auth has been obtained and he was instructed to schedule the PET, radiology phone # provided. \par \par Transaminitis:\par - Grade 3 immunotherapy associated transaminitis: 12/15/22 AST = 316, ALT = 289, and alk phos = 446\par - Solumedrol 500mg IV given 12/16/22 and prednisone 50mg bid started 12/17/22. \par - LFTs completely normalized as of 1/5/23: AST = 27, ALT = 45, alk phos = 108\par - Currently on prednisone 40mg daily. Repeat LFTs today pending. Instructed to decrease prednisone to 30mg daily. \par \par HTN:\par - Cabozantinib may worsen HTN. Instructed to monitor closely. \par - BP initially 146/101 today, asymptomatic. Repeat BP at end of visit was 136/96. \par - He is taking amlodipine 5mg instead of 10mg as previously prescribed. Instructed to take amlodipine 10mg daily. \par - He has a BP machine at home. Instructed to check his BP daily and contact the office if SBP >160 and/or DBP >90. \par \par Tooth infection:\par - Reportedly has an infection of his top front tooth, his dentist advised that he needs a tooth extraction. I discussed the r/o improper wound healing while on TKIs and reiterated the need to hold cabozantinib before and after any invasive procedures. I previously spoke with pt's dentist Dr. French [phone]. The tooth is severely infected and unlikely to respond to antibiotics alone, he believes the tooth will need to be extracted though it does not have to be done urgently. Pt will have to hold cabozantinib 1wk before and after the extraction at that time. \par - No tooth pain at present, will continue to monitor closely for now. \par \par CKD:\par - Baseline Cr is 2.89 - 3.46\par - 1/5/23 Cr = 3.32, stable\par - Continue f/u with renal. \par \par Instructed to contact our office with any new/worsening symptoms.\par Pt educated regarding plan of care, all questions/concerns addressed to the best of my abilities and his apparent satisfaction.\par F/u in 2wks.
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
## 2023-01-26 ENCOUNTER — OUTPATIENT (OUTPATIENT)
Dept: OUTPATIENT SERVICES | Facility: HOSPITAL | Age: 58
LOS: 1 days | Discharge: HOME | End: 2023-01-26

## 2023-01-26 DIAGNOSIS — Z90.5 ACQUIRED ABSENCE OF KIDNEY: Chronic | ICD-10-CM

## 2023-01-26 DIAGNOSIS — C64.9 MALIGNANT NEOPLASM OF UNSPECIFIED KIDNEY, EXCEPT RENAL PELVIS: ICD-10-CM

## 2023-01-26 DIAGNOSIS — Z98.89 OTHER SPECIFIED POSTPROCEDURAL STATES: Chronic | ICD-10-CM

## 2023-01-31 ENCOUNTER — APPOINTMENT (OUTPATIENT)
Dept: HEMATOLOGY ONCOLOGY | Facility: CLINIC | Age: 58
End: 2023-01-31

## 2023-02-02 ENCOUNTER — APPOINTMENT (OUTPATIENT)
Dept: HEMATOLOGY ONCOLOGY | Facility: CLINIC | Age: 58
End: 2023-02-02
Payer: MEDICAID

## 2023-02-02 ENCOUNTER — APPOINTMENT (OUTPATIENT)
Dept: INFUSION THERAPY | Facility: HOSPITAL | Age: 58
End: 2023-02-02

## 2023-02-13 ENCOUNTER — RESULT REVIEW (OUTPATIENT)
Age: 58
End: 2023-02-13

## 2023-02-13 ENCOUNTER — APPOINTMENT (OUTPATIENT)
Dept: HEMATOLOGY ONCOLOGY | Facility: CLINIC | Age: 58
End: 2023-02-13
Payer: MEDICAID

## 2023-02-13 VITALS
OXYGEN SATURATION: 96 % | BODY MASS INDEX: 33.54 KG/M2 | RESPIRATION RATE: 16 BRPM | HEIGHT: 73.74 IN | HEART RATE: 100 BPM | DIASTOLIC BLOOD PRESSURE: 90 MMHG | WEIGHT: 258.6 LBS | SYSTOLIC BLOOD PRESSURE: 147 MMHG | TEMPERATURE: 97.9 F

## 2023-02-13 LAB
BASOPHILS # BLD AUTO: 0.01 K/UL — SIGNIFICANT CHANGE UP (ref 0–0.2)
BASOPHILS NFR BLD AUTO: 0.2 % — SIGNIFICANT CHANGE UP (ref 0–2)
EOSINOPHIL # BLD AUTO: 0 K/UL — SIGNIFICANT CHANGE UP (ref 0–0.5)
EOSINOPHIL NFR BLD AUTO: 0 % — SIGNIFICANT CHANGE UP (ref 0–6)
HCT VFR BLD CALC: 44.4 % — SIGNIFICANT CHANGE UP (ref 39–50)
HGB BLD-MCNC: 14.1 G/DL — SIGNIFICANT CHANGE UP (ref 13–17)
IMM GRANULOCYTES NFR BLD AUTO: 0.8 % — SIGNIFICANT CHANGE UP (ref 0–0.9)
LYMPHOCYTES # BLD AUTO: 0.51 K/UL — LOW (ref 1–3.3)
LYMPHOCYTES # BLD AUTO: 7.9 % — LOW (ref 13–44)
MCHC RBC-ENTMCNC: 31.8 G/DL — LOW (ref 32–36)
MCHC RBC-ENTMCNC: 34.9 PG — HIGH (ref 27–34)
MCV RBC AUTO: 109.9 FL — HIGH (ref 80–100)
MONOCYTES # BLD AUTO: 0.2 K/UL — SIGNIFICANT CHANGE UP (ref 0–0.9)
MONOCYTES NFR BLD AUTO: 3.1 % — SIGNIFICANT CHANGE UP (ref 2–14)
NEUTROPHILS # BLD AUTO: 5.65 K/UL — SIGNIFICANT CHANGE UP (ref 1.8–7.4)
NEUTROPHILS NFR BLD AUTO: 88 % — HIGH (ref 43–77)
NRBC # BLD: 0 /100 WBCS — SIGNIFICANT CHANGE UP (ref 0–0)
PLATELET # BLD AUTO: 195 K/UL — SIGNIFICANT CHANGE UP (ref 150–400)
RBC # BLD: 4.04 M/UL — LOW (ref 4.2–5.8)
RBC # FLD: 15.9 % — HIGH (ref 10.3–14.5)
WBC # BLD: 6.42 K/UL — SIGNIFICANT CHANGE UP (ref 3.8–10.5)
WBC # FLD AUTO: 6.42 K/UL — SIGNIFICANT CHANGE UP (ref 3.8–10.5)

## 2023-02-13 PROCEDURE — 99214 OFFICE O/P EST MOD 30 MIN: CPT

## 2023-02-13 NOTE — RESULTS/DATA
[FreeTextEntry1] : EXAM: [id number] - PETCT [facility] ONC FDG SUBS - ORDERED BY: KEERTHI FERREIRA\par PROCEDURE DATE: 09/03/2022\par INTERPRETATION: PROCEDURE: PET/CT SKULL BASE-MID THIGH IMAGING\par CLINICAL INFORMATION: 57-year-old male with recurrent metastatic renal cell cancer, on treatment with carbozanitib and nivolumab. PET/CT is done as part of subsequent treatment strategy evaluation.\par \par RADIOPHARMACEUTICAL: 13.81 mCi F-18, FDG, I.V.\par \par TECHNIQUE: Fasting blood sugar measured prior to injection of radiopharmaceutical was 95 mg/dl. Following intravenous injection of the radiopharmaceutical and an uptake period of approximately 60 minutes, FDG-PET/CT was obtained on a [employer] 710 scanner from skull base to mid thigh. Oral contrast was given during the uptake period. CT was performed during shallow respiration. The CT protocol was optimized for PET attenuation correction and anatomic localization. The CT protocol was not designed to produce and cannot replace state-of-the-art diagnostic CT images with specific imaging protocols for different body parts and indications. Images were reviewed on a dedicated workstation using multiplanar reconstruction.\par \par The standardized uptake values (SUV) are normalized to patient body weight and indicate the highest activity concentration (SUVmax) in a given disease site. All image numbers refer to axial image number.\par \par COMPARISON: PET/CT from 3/15/2022.\par \par OTHER STUDIES USED FOR CORRELATION: No interval related imaging studies.\par \par FINDINGS:\par \par HEAD/NECK: Physiologic FDG activity in visualized brain, head, and neck.\par \par CHEST: FDG avid subcarinal lymph node is minimally decreased in size but unchanged in avidity measuring 3.2 x 1.6 cm with SUV 6.6; image 97; previously 3.6 x 2.3 cm with SUV 5.5). Difficult to delineate left perihilar lymph node is unchanged in size and avidity measuring approximately 1.8 x 1.7 cm with SUV 4.7; image 98; previously 1.8 x 1.7 cm with SUV 4.4).\par \par LUNGS: Again seen are 3 left lower lobe pulmonary nodules which have decreased size and either resolved or decreased avidity. For reference, the largest, most FDG avid nodule located in the peripheral left lower lobe currently measures 1.7 x 1.4 cm with SUV 3.6 (image 115; previously 2 x 1.9 cm with SUV 5.7). The centrally located nodule measures 0.9 x 1 cm with SUV 1.2 (image 110; previously 1.4 x 1.3 cm with SUV 1.9). Interval resolution of FDG avidity associated with the superiorly located nodule measures 0.9 x 0.9 cm (previously 1.2 x 1.1 cm avid SUV 1.1). No new nodules. Biapical bullous disease and paraseptal emphysema, unchanged.\par \par PLEURA/PERICARDIUM: No abnormal FDG activity. No effusion.\par \par HEPATOBILIARY/PANCREAS: Physiologic FDG activity. Liver SUV mean is 2.5, previously 3.0. Unchanged course pancreatic head calcifications compatible with history of chronic pancreatitis.\par \par SPLEEN: Physiologic FDG activity. Normal in size.\par \par ADRENAL GLANDS: No abnormal FDG activity. Nodularity in the bilateral adrenal glands, unchanged.\par \par KIDNEYS/URINARY BLADDER: Status post right nephrectomy. Lobulated soft tissue in the right renal bed has minimally decreased in size but unchanged in avidity measuring 3.6 x 1.7 cm with SUV 6.6; image 159; previously 4.2 x 2.2 cm with SUV 5.1).\par \par Again seen are 4 FDG avid left renal masses which are either decreased or not significantly changed in size. The largest lesion, located in the anterior aspect of the left kidney measures 5.7 x 5.0 cm with central photopenia and peripheral FDG avidity with SUV 5.1 (image 163; previously 7.3 x 7.2 cm with peripheral FDG avidity with SUV 5.0). In the posterior aspect of the same level 4.7 x 4.6 cm mass is noted with new central photopenia and peripheral avidity with SUV 5.5 (image 163; previous 4.6 x 4.0 cm with SUV 5.7). The partially calcified, lobulated left upper pole exophytic lesion measures 3.0 x 1.9 cm with SUV 3.5 (image 146; previously 3.5 x 2.3 cm with SUV 5.9). The exophytic left lower pole mass measures approximately 4.4 x 4.1 cm with SUV 3.3 (image 178; previously 4.8 x 4.1 cm with SUV 3.9). Unchanged left renal staghorn calculus. No hydronephrosis.\par \par REPRODUCTIVE ORGANS: No abnormal FDG activity.\par \par ABDOMINOPELVIC NODES: FDG avid retroperitoneal lymphadenopathy is not significantly changed in size and avidity. Reference lesion located anterior to the right psoas muscle measures 3.8 x 1.5 cm with SUV 5.7 (image 188; previously 3.6 x 2.0 cm with SUV 5.8).\par \par BOWEL/PERITONEUM/MESENTERY: Multiple FDG avid omental nodules and masses are similar or mildly decreased or increased in size and avidity. Mass in the anterior left midabdomen measures approximately 5.6 x 4.3 cm with SUV 9.1 (image 167; previously 7.0 x 4.8 cm with SUV 6.7). A mass in the left lateral abdomen just anterior to the left kidney measures approximately 6.1 x 3.7 cm with SUV 9.3 (image 169; previously approximately 7.2 x 4.7 cm with SUV 6.6-remeasured).\par \par New diffuse gastric hypermetabolism (SUV 8.1; image 129) with questionable wall thickening on CT.\par \par BONES/No abnormal FDG activity.\par \par SOFT TISSUES: Nonspecific new mildly FDG avid subtle skin thickening in the anterior pelvic wall just to the left of midline (SUV 4.7; image 233).\par \par IMPRESSION: Compared to FDG-PET/CT scan dated 3/15/2022:\par \par 1. Multiple FDG avid left renal masses not significantly changed in size and avidity compatible with renal cell carcinoma. The posteriorly located mass demonstrates new central photopenia, likely representing necrosis. FDG avid lobulated soft tissue in the right renal bed is minimally decreased in size but not significantly changed in avidity.\par \par 2. FDG avid subcarinal, left perihilar, and retroperitoneal lymphadenopathy, either minimally increased or decreased in size and avidity. These are compatible with metastatic disease.\par \par 3. Multiple FDG avid omental nodules and masses, are minimally decreased in size but increased in avidity.\par \par 4. Left lower lobe pulmonary nodules are decreased in size in either resolved or decreased avidity.\par \par 5. New FDG avid questionable diffuse gastric wall thickening. Please correlate clinically for gastritis or with endoscopy further evaluation as indicated.\par \par 6. Nonspecific new mildly FDG avid subtle wall thickening in the anterior pelvic wall just to the left of the midline. Please correlate with direct visualization.\par \par --- End of Report -\par JURGEN WRIGHT MD; Attending Nuclear Medicine\par This document has been electronically signed. Sep 5 2022 4:13PM\par

## 2023-02-13 NOTE — PHYSICAL EXAM
[Fully active, able to carry on all pre-disease performance without restriction] : Status 0 - Fully active, able to carry on all pre-disease performance without restriction [Normal] : affect appropriate [de-identified] : no edema
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
## 2023-02-13 NOTE — REVIEW OF SYSTEMS
[Skin Rash] : skin rash [Easy Bruising] : a tendency for easy bruising [Negative] : Gastrointestinal [Fever] : no fever [Chills] : no chills [Fatigue] : no fatigue [Recent Change In Weight] : ~T no recent weight change [Chest Pain] : no chest pain [Palpitations] : no palpitations [Lower Ext Edema] : no lower extremity edema [Wheezing] : no wheezing [Cough] : no cough [SOB on Exertion] : no shortness of breath during exertion [Dysuria] : no dysuria [Incontinence] : no incontinence [Joint Pain] : no joint pain [Joint Stiffness] : no joint stiffness [Dizziness] : no dizziness [Anxiety] : no anxiety [Depression] : no depression [Muscle Weakness] : no muscle weakness [Easy Bleeding] : no tendency for easy bleeding [FreeTextEntry9] : no cramps [de-identified] : No HA,

## 2023-02-13 NOTE — ASSESSMENT
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 Nivolumab started 4/19/22, start of cabozantinib delayed to 5/2/22 d/t insurance issues. \par \par Met RCC:\par - 9/3/22 PET scan shows stable to improvement in disease. Repeat PET was done at outside location, brought disc, will need to be loaded on PACS, reviewed and compared to prior\par - S/p nivo cycle 8 on 11/8/22 and cabozantinib held as of 12/16/22 for grade 3 transaminitis. Cabozantinib resumed 12/27/22. \par - Continue cabozantinib 40mg daily, tolerating well with no significant AEs. \par \par \par Transaminitis:\par - Grade 3 immunotherapy associated transaminitis: 12/15/22 AST = 316, ALT = 289, and alk phos = 446\par - Solumedrol 500mg IV given 12/16/22 and prednisone 50mg bid started 12/17/22. \par - LFTs completely normalized as of 1/5/23: AST = 27, ALT = 45, alk phos = 108\par - Currently on prednisone 30mg daily. Repeat LFTs today pending. Instructed to decrease prednisone to 20 mg daily. \par \par HTN:\par - Cabozantinib may worsen HTN. Instructed to monitor closely. \par - /90\par - He is taking amlodipine  10mg as previously prescribed.  \par - He has a BP machine at home. Instructed to check his BP daily and contact the office if SBP >160 and/or DBP >90. checks daily at home, 2 times a day usually\par \par Tooth infection:\par -had extraction\par \par CKD:\par - Baseline Cr is 2.89 - 3.46\par - labs pending\par - Continue f/u with renal. \par \par Instructed to contact our office with any new/worsening symptoms.\par Pt educated regarding plan of care, all questions/concerns addressed to the best of my abilities and his apparent satisfaction.\par F/u in 2wks.\par \par Brought in the disc from his PET scan.  The PET scan was performed at an outside institution as mandated by his insurance company.  However, despite the radiologist indicating that it was for follow-up, no effort was made to secure even a report of the prior scan for comparison.  This will need to be loaded onto our system for review and comparison to the prior PET scan. I cannot assess for evidence of progression or response until this is performed.

## 2023-02-14 LAB
ALBUMIN SERPL ELPH-MCNC: 3.9 G/DL
ALP BLD-CCNC: 66 U/L
ALT SERPL-CCNC: 32 U/L
ANION GAP SERPL CALC-SCNC: 16 MMOL/L
AST SERPL-CCNC: 28 U/L
BILIRUB SERPL-MCNC: 0.5 MG/DL
BUN SERPL-MCNC: 48 MG/DL
CALCIUM SERPL-MCNC: 7.5 MG/DL
CHLORIDE SERPL-SCNC: 107 MMOL/L
CO2 SERPL-SCNC: 20 MMOL/L
CREAT SERPL-MCNC: 3.45 MG/DL
CREAT SPEC-SCNC: 109 MG/DL
CREAT/PROT UR: 1.4 RATIO
EGFR: 20 ML/MIN/1.73M2
GLUCOSE SERPL-MCNC: 99 MG/DL
LDH SERPL-CCNC: 521 U/L
POTASSIUM SERPL-SCNC: 5 MMOL/L
PROT SERPL-MCNC: 6.4 G/DL
PROT UR-MCNC: 157 MG/DL
SODIUM SERPL-SCNC: 142 MMOL/L
T4 FREE SERPL-MCNC: 1.2 NG/DL
TSH SERPL-ACNC: 1.46 UIU/ML

## 2023-02-21 ENCOUNTER — NON-APPOINTMENT (OUTPATIENT)
Age: 58
End: 2023-02-21

## 2023-03-24 ENCOUNTER — NON-APPOINTMENT (OUTPATIENT)
Age: 58
End: 2023-03-24

## 2023-03-27 ENCOUNTER — APPOINTMENT (OUTPATIENT)
Dept: HEMATOLOGY ONCOLOGY | Facility: CLINIC | Age: 58
End: 2023-03-27

## 2023-04-24 ENCOUNTER — OUTPATIENT (OUTPATIENT)
Dept: OUTPATIENT SERVICES | Facility: HOSPITAL | Age: 58
LOS: 1 days | Discharge: HOME | End: 2023-04-24

## 2023-04-24 DIAGNOSIS — C64.9 MALIGNANT NEOPLASM OF UNSPECIFIED KIDNEY, EXCEPT RENAL PELVIS: ICD-10-CM

## 2023-04-24 DIAGNOSIS — Z90.5 ACQUIRED ABSENCE OF KIDNEY: Chronic | ICD-10-CM

## 2023-04-24 DIAGNOSIS — Z98.89 OTHER SPECIFIED POSTPROCEDURAL STATES: Chronic | ICD-10-CM

## 2023-05-01 ENCOUNTER — RESULT REVIEW (OUTPATIENT)
Age: 58
End: 2023-05-01

## 2023-05-01 ENCOUNTER — APPOINTMENT (OUTPATIENT)
Dept: HEMATOLOGY ONCOLOGY | Facility: CLINIC | Age: 58
End: 2023-05-01
Payer: MEDICAID

## 2023-05-01 VITALS
SYSTOLIC BLOOD PRESSURE: 111 MMHG | RESPIRATION RATE: 16 BRPM | WEIGHT: 242.95 LBS | DIASTOLIC BLOOD PRESSURE: 81 MMHG | HEART RATE: 85 BPM | BODY MASS INDEX: 31.41 KG/M2 | TEMPERATURE: 98.1 F | OXYGEN SATURATION: 97 %

## 2023-05-01 LAB
BASOPHILS # BLD AUTO: 0.04 K/UL — SIGNIFICANT CHANGE UP (ref 0–0.2)
BASOPHILS NFR BLD AUTO: 0.7 % — SIGNIFICANT CHANGE UP (ref 0–2)
EOSINOPHIL # BLD AUTO: 0.14 K/UL — SIGNIFICANT CHANGE UP (ref 0–0.5)
EOSINOPHIL NFR BLD AUTO: 2.5 % — SIGNIFICANT CHANGE UP (ref 0–6)
HCT VFR BLD CALC: 39.4 % — SIGNIFICANT CHANGE UP (ref 39–50)
HGB BLD-MCNC: 12.5 G/DL — LOW (ref 13–17)
IMM GRANULOCYTES NFR BLD AUTO: 0.4 % — SIGNIFICANT CHANGE UP (ref 0–0.9)
LYMPHOCYTES # BLD AUTO: 1.52 K/UL — SIGNIFICANT CHANGE UP (ref 1–3.3)
LYMPHOCYTES # BLD AUTO: 27.1 % — SIGNIFICANT CHANGE UP (ref 13–44)
MCHC RBC-ENTMCNC: 31.7 G/DL — LOW (ref 32–36)
MCHC RBC-ENTMCNC: 35.2 PG — HIGH (ref 27–34)
MCV RBC AUTO: 111 FL — HIGH (ref 80–100)
MONOCYTES # BLD AUTO: 0.51 K/UL — SIGNIFICANT CHANGE UP (ref 0–0.9)
MONOCYTES NFR BLD AUTO: 9.1 % — SIGNIFICANT CHANGE UP (ref 2–14)
NEUTROPHILS # BLD AUTO: 3.38 K/UL — SIGNIFICANT CHANGE UP (ref 1.8–7.4)
NEUTROPHILS NFR BLD AUTO: 60.2 % — SIGNIFICANT CHANGE UP (ref 43–77)
NRBC # BLD: 0 /100 WBCS — SIGNIFICANT CHANGE UP (ref 0–0)
PLATELET # BLD AUTO: 269 K/UL — SIGNIFICANT CHANGE UP (ref 150–400)
RBC # BLD: 3.55 M/UL — LOW (ref 4.2–5.8)
RBC # FLD: 15.2 % — HIGH (ref 10.3–14.5)
WBC # BLD: 5.61 K/UL — SIGNIFICANT CHANGE UP (ref 3.8–10.5)
WBC # FLD AUTO: 5.61 K/UL — SIGNIFICANT CHANGE UP (ref 3.8–10.5)

## 2023-05-01 PROCEDURE — 99214 OFFICE O/P EST MOD 30 MIN: CPT

## 2023-05-01 RX ORDER — PREDNISONE 20 MG/1
20 TABLET ORAL DAILY
Qty: 60 | Refills: 0 | Status: DISCONTINUED | COMMUNITY
Start: 2022-12-16 | End: 2023-05-01
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
## 2023-05-01 NOTE — RESULTS/DATA
[FreeTextEntry1] : EXAM: [id number] - PETCT SK-[facility] ONC FDG SUBS - ORDERED BY: KEERTHI FERREIRA\par PROCEDURE DATE: 09/03/2022\par INTERPRETATION: PROCEDURE: PET/CT SKULL BASE-MID THIGH IMAGING\par CLINICAL INFORMATION: 57-year-old male with recurrent metastatic renal cell cancer, on treatment with carbozanitib and nivolumab. PET/CT is done as part of subsequent treatment strategy evaluation.\par \par RADIOPHARMACEUTICAL: 13.81 mCi F-18, FDG, I.V.\par \par TECHNIQUE: Fasting blood sugar measured prior to injection of radiopharmaceutical was 95 mg/dl. Following intravenous injection of the radiopharmaceutical and an uptake period of approximately 60 minutes, FDG-PET/CT was obtained on a [employer] 710 scanner from skull base to mid thigh. Oral contrast was given during the uptake period. CT was performed during shallow respiration. The CT protocol was optimized for PET attenuation correction and anatomic localization. The CT protocol was not designed to produce and cannot replace state-of-the-art diagnostic CT images with specific imaging protocols for different body parts and indications. Images were reviewed on a dedicated workstation using multiplanar reconstruction.\par \par The standardized uptake values (SUV) are normalized to patient body weight and indicate the highest activity concentration (SUVmax) in a given disease site. All image numbers refer to axial image number.\par \par COMPARISON: PET/CT from 3/15/2022.\par \par OTHER STUDIES USED FOR CORRELATION: No interval related imaging studies.\par \par FINDINGS:\par \par HEAD/NECK: Physiologic FDG activity in visualized brain, head, and neck.\par \par CHEST: FDG avid subcarinal lymph node is minimally decreased in size but unchanged in avidity measuring 3.2 x 1.6 cm with SUV 6.6; image 97; previously 3.6 x 2.3 cm with SUV 5.5). Difficult to delineate left perihilar lymph node is unchanged in size and avidity measuring approximately 1.8 x 1.7 cm with SUV 4.7; image 98; previously 1.8 x 1.7 cm with SUV 4.4).\par \par LUNGS: Again seen are 3 left lower lobe pulmonary nodules which have decreased size and either resolved or decreased avidity. For reference, the largest, most FDG avid nodule located in the peripheral left lower lobe currently measures 1.7 x 1.4 cm with SUV 3.6 (image 115; previously 2 x 1.9 cm with SUV 5.7). The centrally located nodule measures 0.9 x 1 cm with SUV 1.2 (image 110; previously 1.4 x 1.3 cm with SUV 1.9). Interval resolution of FDG avidity associated with the superiorly located nodule measures 0.9 x 0.9 cm (previously 1.2 x 1.1 cm avid SUV 1.1). No new nodules. Biapical bullous disease and paraseptal emphysema, unchanged.\par \par PLEURA/PERICARDIUM: No abnormal FDG activity. No effusion.\par \par HEPATOBILIARY/PANCREAS: Physiologic FDG activity. Liver SUV mean is 2.5, previously 3.0. Unchanged course pancreatic head calcifications compatible with history of chronic pancreatitis.\par \par SPLEEN: Physiologic FDG activity. Normal in size.\par \par ADRENAL GLANDS: No abnormal FDG activity. Nodularity in the bilateral adrenal glands, unchanged.\par \par KIDNEYS/URINARY BLADDER: Status post right nephrectomy. Lobulated soft tissue in the right renal bed has minimally decreased in size but unchanged in avidity measuring 3.6 x 1.7 cm with SUV 6.6; image 159; previously 4.2 x 2.2 cm with SUV 5.1).\par \par Again seen are 4 FDG avid left renal masses which are either decreased or not significantly changed in size. The largest lesion, located in the anterior aspect of the left kidney measures 5.7 x 5.0 cm with central photopenia and peripheral FDG avidity with SUV 5.1 (image 163; previously 7.3 x 7.2 cm with peripheral FDG avidity with SUV 5.0). In the posterior aspect of the same level 4.7 x 4.6 cm mass is noted with new central photopenia and peripheral avidity with SUV 5.5 (image 163; previous 4.6 x 4.0 cm with SUV 5.7). The partially calcified, lobulated left upper pole exophytic lesion measures 3.0 x 1.9 cm with SUV 3.5 (image 146; previously 3.5 x 2.3 cm with SUV 5.9). The exophytic left lower pole mass measures approximately 4.4 x 4.1 cm with SUV 3.3 (image 178; previously 4.8 x 4.1 cm with SUV 3.9). Unchanged left renal staghorn calculus. No hydronephrosis.\par \par REPRODUCTIVE ORGANS: No abnormal FDG activity.\par \par ABDOMINOPELVIC NODES: FDG avid retroperitoneal lymphadenopathy is not significantly changed in size and avidity. Reference lesion located anterior to the right psoas muscle measures 3.8 x 1.5 cm with SUV 5.7 (image 188; previously 3.6 x 2.0 cm with SUV 5.8).\par \par BOWEL/PERITONEUM/MESENTERY: Multiple FDG avid omental nodules and masses are similar or mildly decreased or increased in size and avidity. Mass in the anterior left midabdomen measures approximately 5.6 x 4.3 cm with SUV 9.1 (image 167; previously 7.0 x 4.8 cm with SUV 6.7). A mass in the left lateral abdomen just anterior to the left kidney measures approximately 6.1 x 3.7 cm with SUV 9.3 (image 169; previously approximately 7.2 x 4.7 cm with SUV 6.6-remeasured).\par \par New diffuse gastric hypermetabolism (SUV 8.1; image 129) with questionable wall thickening on CT.\par \par BONES/No abnormal FDG activity.\par \par SOFT TISSUES: Nonspecific new mildly FDG avid subtle skin thickening in the anterior pelvic wall just to the left of midline (SUV 4.7; image 233).\par \par IMPRESSION: Compared to FDG-PET/CT scan dated 3/15/2022:\par \par 1. Multiple FDG avid left renal masses not significantly changed in size and avidity compatible with renal cell carcinoma. The posteriorly located mass demonstrates new central photopenia, likely representing necrosis. FDG avid lobulated soft tissue in the right renal bed is minimally decreased in size but not significantly changed in avidity.\par \par 2. FDG avid subcarinal, left perihilar, and retroperitoneal lymphadenopathy, either minimally increased or decreased in size and avidity. These are compatible with metastatic disease.\par \par 3. Multiple FDG avid omental nodules and masses, are minimally decreased in size but increased in avidity.\par \par 4. Left lower lobe pulmonary nodules are decreased in size in either resolved or decreased avidity.\par \par 5. New FDG avid questionable diffuse gastric wall thickening. Please correlate clinically for gastritis or with endoscopy further evaluation as indicated.\par \par 6. Nonspecific new mildly FDG avid subtle wall thickening in the anterior pelvic wall just to the left of the midline. Please correlate with direct visualization.\par \par --- End of Report -\par JURGEN WRIGHT MD; Attending Nuclear Medicine\par This document has been electronically signed. Sep 5 2022 4:13PM\par

## 2023-05-01 NOTE — ASSESSMENT
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 Nivolumab started 4/19/22, start of cabozantinib delayed to 5/2/22 d/t insurance issues. \par \par He missed his last appointment.  He was last seen at the end of February.  He had 8 cycles of Opdivo which was stopped on November 8, 2022 and thereafter held for grade 3 transaminitis.  He continues on cabozantinib at 40 mg daily and has been on it for 1 year at this point.  Prednisone has been tapered off and discontinued.  He states that he feels "not bad, good".  He had surgery on his veins in the left leg at [facility].  He said that needles were placed to "straighten them out".  He has some swelling of the right foot in recent times and some minor pain in the area.  There is no known history of gout.  Pain was bad 2 days ago.  It is improving now.  His appetite is good.  However, he is lost 7 kg since late February and although he says he is trying to lose this is an alarming loss of weight.  He said that he is eating somewhat and there is no altered taste.  There is no nausea vomiting diarrhea or constipation.  There is no cough or shortness of breath.  He denies any headaches or dizziness.  There were no balance issues.  He has some minor fatigue and often naps in the afternoon.  He walks for exercise.\par \par Met RCC:\par - 9/3/22 PET scan shows stable to improvement in disease. Repeat PET was done at outside location, brought disc, will need to be loaded on PACS, reviewed and compared to prior\par - S/p nivo cycle 8 on 11/8/22 and cabozantinib held as of 12/16/22 for grade 3 transaminitis. Cabozantinib resumed 12/27/22. \par - Continue cabozantinib 40mg daily, tolerating well with no significant AEs. \par \par Transaminitis:\par - Grade 3 immunotherapy associated transaminitis: 12/15/22 AST = 316, ALT = 289, and alk phos = 446\par - Solumedrol 500mg IV given 12/16/22 and prednisone 50mg bid started 12/17/22. \par - LFTs completely normalized as of 1/5/23: AST = 27, ALT = 45, alk phos = 108\par - Currently off prednisone. Repeat LFTs today pending. \par \par HTN:\par - Cabozantinib may worsen HTN. Instructed to monitor closely. \par - /81\par - He is taking amlodipine  10mg as previously prescribed.  \par - He has a BP machine at home. Instructed to check his BP daily and contact the office if SBP >160 and/or DBP >90. checks daily at home, 2 times a day usually\par \par Tooth infection:\par -had extraction\par \par CKD:\par - Baseline Cr is 2.89 - 3.46\par - labs pending\par - Continue f/u with renal. \par \par weight loss, unintentional. \par -Cabozantinib can often be associated with loss of appetite, mucositis, altered taste, diarrhea and sometimes malabsorption, but there are no symptoms to suggest that these of the issues.  His weight will need to be monitored at this point.  His most recent imaging studies were performed by PET CT scan but unfortunately it was done at an outside radiology center and was not able to be compared to the prior study.  It has been loaded onto the system.\par \par Instructed to contact our office with any new/worsening symptoms.\par Pt educated regarding plan of care, all questions/concerns addressed to the best of my abilities and his apparent satisfaction.\par F/u in 4wks.\par \par

## 2023-05-01 NOTE — REASON FOR VISIT
Problem: MOBILITY - ADULT  Goal: Maintain or return to baseline ADL function  Description: INTERVENTIONS:  -  Assess patient's ability to carry out ADLs; assess patient's baseline for ADL function and identify physical deficits which impact ability to perform ADLs (bathing, care of mouth/teeth, toileting, grooming, dressing, etc )  - Assess/evaluate cause of self-care deficits   - Assess range of motion  - Assess patient's mobility; develop plan if impaired  - Assess patient's need for assistive devices and provide as appropriate  - Encourage maximum independence but intervene and supervise when necessary  - Involve family in performance of ADLs  - Assess for home care needs following discharge   - Consider OT consult to assist with ADL evaluation and planning for discharge  - Provide patient education as appropriate  Outcome: Progressing  Goal: Maintains/Returns to pre admission functional level  Description: INTERVENTIONS:  - Perform BMAT or MOVE assessment daily    - Set and communicate daily mobility goal to care team and patient/family/caregiver  - Collaborate with rehabilitation services on mobility goals if consulted  - Perform Range of Motion 3 times a day  - Reposition patient every 2 hours    - Dangle patient 3 times a day  - Stand patient 3 times a day  - Ambulate patient 3 times a day  - Out of bed to chair 3 times a day   - Out of bed for meals 3 times a day  - Out of bed for toileting  - Record patient progress and toleration of activity level   Outcome: Progressing     Problem: Potential for Falls  Goal: Patient will remain free of falls  Description: INTERVENTIONS:  - Educate patient/family on patient safety including physical limitations  - Instruct patient to call for assistance with activity   - Consult OT/PT to assist with strengthening/mobility   - Keep Call bell within reach  - Keep bed low and locked with side rails adjusted as appropriate  - Keep care items and personal belongings within reach  - Initiate and maintain comfort rounds  - Make Fall Risk Sign visible to staff  - Offer Toileting every 1 Hours, in advance of need  - Initiate/Maintain bed alarm  - Obtain necessary fall risk management equipment: bed   - Apply yellow socks and bracelet for high fall risk patients  - Consider moving patient to room near nurses station  Outcome: Progressing     Problem: Prexisting or High Potential for Compromised Skin Integrity  Goal: Skin integrity is maintained or improved  Description: INTERVENTIONS:  - Identify patients at risk for skin breakdown  - Assess and monitor skin integrity  - Assess and monitor nutrition and hydration status  - Monitor labs   - Assess for incontinence   - Turn and reposition patient  - Assist with mobility/ambulation  - Relieve pressure over bony prominences  - Avoid friction and shearing  - Provide appropriate hygiene as needed including keeping skin clean and dry  - Evaluate need for skin moisturizer/barrier cream  - Collaborate with interdisciplinary team   - Patient/family teaching  - Consider wound care consult   Outcome: Progressing [FreeTextEntry2] : renal cell carcinoma, recurrent, metastatic

## 2023-05-02 ENCOUNTER — NON-APPOINTMENT (OUTPATIENT)
Age: 58
End: 2023-05-02

## 2023-05-03 ENCOUNTER — APPOINTMENT (OUTPATIENT)
Dept: HEPATOLOGY | Facility: CLINIC | Age: 58
End: 2023-05-03

## 2023-05-08 ENCOUNTER — NON-APPOINTMENT (OUTPATIENT)
Age: 58
End: 2023-05-08

## 2023-05-08 ENCOUNTER — RESULT REVIEW (OUTPATIENT)
Age: 58
End: 2023-05-08

## 2023-05-08 ENCOUNTER — APPOINTMENT (OUTPATIENT)
Dept: HEMATOLOGY ONCOLOGY | Facility: CLINIC | Age: 58
End: 2023-05-08

## 2023-05-08 LAB
ALBUMIN SERPL ELPH-MCNC: 4.1 G/DL
ALP BLD-CCNC: 896 U/L
ALT SERPL-CCNC: 353 U/L
ANION GAP SERPL CALC-SCNC: 13 MMOL/L
AST SERPL-CCNC: 440 U/L
BASOPHILS # BLD AUTO: 0.01 K/UL — SIGNIFICANT CHANGE UP (ref 0–0.2)
BASOPHILS NFR BLD AUTO: 0.1 % — SIGNIFICANT CHANGE UP (ref 0–2)
BILIRUB SERPL-MCNC: 1.3 MG/DL
BUN SERPL-MCNC: 52 MG/DL
CALCIUM SERPL-MCNC: 9 MG/DL
CHLORIDE SERPL-SCNC: 109 MMOL/L
CO2 SERPL-SCNC: 19 MMOL/L
CREAT SERPL-MCNC: 3.88 MG/DL
EGFR: 17 ML/MIN/1.73M2
EOSINOPHIL # BLD AUTO: 0.01 K/UL — SIGNIFICANT CHANGE UP (ref 0–0.5)
EOSINOPHIL NFR BLD AUTO: 0.1 % — SIGNIFICANT CHANGE UP (ref 0–6)
GLUCOSE SERPL-MCNC: 103 MG/DL
HCT VFR BLD CALC: 37.7 % — LOW (ref 39–50)
HGB BLD-MCNC: 12.3 G/DL — LOW (ref 13–17)
IMM GRANULOCYTES NFR BLD AUTO: 0.4 % — SIGNIFICANT CHANGE UP (ref 0–0.9)
LYMPHOCYTES # BLD AUTO: 0.37 K/UL — LOW (ref 1–3.3)
LYMPHOCYTES # BLD AUTO: 5.3 % — LOW (ref 13–44)
MCHC RBC-ENTMCNC: 32.6 G/DL — SIGNIFICANT CHANGE UP (ref 32–36)
MCHC RBC-ENTMCNC: 35.4 PG — HIGH (ref 27–34)
MCV RBC AUTO: 108.6 FL — HIGH (ref 80–100)
MONOCYTES # BLD AUTO: 0.1 K/UL — SIGNIFICANT CHANGE UP (ref 0–0.9)
MONOCYTES NFR BLD AUTO: 1.4 % — LOW (ref 2–14)
NEUTROPHILS # BLD AUTO: 6.48 K/UL — SIGNIFICANT CHANGE UP (ref 1.8–7.4)
NEUTROPHILS NFR BLD AUTO: 92.7 % — HIGH (ref 43–77)
NRBC # BLD: 0 /100 WBCS — SIGNIFICANT CHANGE UP (ref 0–0)
PLATELET # BLD AUTO: 294 K/UL — SIGNIFICANT CHANGE UP (ref 150–400)
POTASSIUM SERPL-SCNC: 5 MMOL/L
PROT SERPL-MCNC: 7.3 G/DL
RBC # BLD: 3.47 M/UL — LOW (ref 4.2–5.8)
RBC # FLD: 14.9 % — HIGH (ref 10.3–14.5)
SODIUM SERPL-SCNC: 141 MMOL/L
WBC # BLD: 7 K/UL — SIGNIFICANT CHANGE UP (ref 3.8–10.5)
WBC # FLD AUTO: 7 K/UL — SIGNIFICANT CHANGE UP (ref 3.8–10.5)

## 2023-05-09 LAB
ALBUMIN SERPL ELPH-MCNC: 4.1 G/DL
ALP BLD-CCNC: 627 U/L
ALT SERPL-CCNC: 195 U/L
ANION GAP SERPL CALC-SCNC: 16 MMOL/L
AST SERPL-CCNC: 162 U/L
BILIRUB SERPL-MCNC: 1.1 MG/DL
BUN SERPL-MCNC: 69 MG/DL
CALCIUM SERPL-MCNC: 9.1 MG/DL
CHLORIDE SERPL-SCNC: 110 MMOL/L
CO2 SERPL-SCNC: 17 MMOL/L
CREAT SERPL-MCNC: 3.81 MG/DL
EGFR: 18 ML/MIN/1.73M2
GLUCOSE SERPL-MCNC: 128 MG/DL
POTASSIUM SERPL-SCNC: 4.6 MMOL/L
PROT SERPL-MCNC: 6.7 G/DL
SODIUM SERPL-SCNC: 142 MMOL/L

## 2023-05-15 ENCOUNTER — APPOINTMENT (OUTPATIENT)
Dept: HEMATOLOGY ONCOLOGY | Facility: CLINIC | Age: 58
End: 2023-05-15

## 2023-05-16 LAB
ALBUMIN SERPL ELPH-MCNC: 4.1 G/DL
ALP BLD-CCNC: 386 U/L
ALT SERPL-CCNC: 115 U/L
ANION GAP SERPL CALC-SCNC: 17 MMOL/L
AST SERPL-CCNC: 58 U/L
BILIRUB SERPL-MCNC: 0.5 MG/DL
BUN SERPL-MCNC: 77 MG/DL
CALCIUM SERPL-MCNC: 8.8 MG/DL
CHLORIDE SERPL-SCNC: 112 MMOL/L
CO2 SERPL-SCNC: 16 MMOL/L
CREAT SERPL-MCNC: 4.06 MG/DL
EGFR: 16 ML/MIN/1.73M2
GLUCOSE SERPL-MCNC: 166 MG/DL
POTASSIUM SERPL-SCNC: 4.8 MMOL/L
PROT SERPL-MCNC: 6.8 G/DL
SODIUM SERPL-SCNC: 144 MMOL/L
TSH SERPL-ACNC: 0.66 UIU/ML

## 2023-05-25 ENCOUNTER — NON-APPOINTMENT (OUTPATIENT)
Age: 58
End: 2023-05-25

## 2023-05-31 ENCOUNTER — APPOINTMENT (OUTPATIENT)
Dept: HEMATOLOGY ONCOLOGY | Facility: CLINIC | Age: 58
End: 2023-05-31

## 2023-06-01 LAB
ALBUMIN SERPL ELPH-MCNC: 4.1 G/DL
ALP BLD-CCNC: 168 U/L
ALT SERPL-CCNC: 57 U/L
ANION GAP SERPL CALC-SCNC: 16 MMOL/L
AST SERPL-CCNC: 40 U/L
BILIRUB SERPL-MCNC: 0.6 MG/DL
BUN SERPL-MCNC: 61 MG/DL
CALCIUM SERPL-MCNC: 8.6 MG/DL
CHLORIDE SERPL-SCNC: 111 MMOL/L
CO2 SERPL-SCNC: 14 MMOL/L
CREAT SERPL-MCNC: 3.61 MG/DL
EGFR: 19 ML/MIN/1.73M2
GLUCOSE SERPL-MCNC: 132 MG/DL
POTASSIUM SERPL-SCNC: 5.1 MMOL/L
PROT SERPL-MCNC: 6.5 G/DL
SODIUM SERPL-SCNC: 141 MMOL/L

## 2023-06-05 ENCOUNTER — APPOINTMENT (OUTPATIENT)
Dept: HEMATOLOGY ONCOLOGY | Facility: CLINIC | Age: 58
End: 2023-06-05

## 2023-06-08 ENCOUNTER — RESULT REVIEW (OUTPATIENT)
Age: 58
End: 2023-06-08

## 2023-06-08 ENCOUNTER — APPOINTMENT (OUTPATIENT)
Dept: HEMATOLOGY ONCOLOGY | Facility: CLINIC | Age: 58
End: 2023-06-08

## 2023-06-08 LAB
BASOPHILS # BLD AUTO: 0.01 K/UL — SIGNIFICANT CHANGE UP (ref 0–0.2)
BASOPHILS NFR BLD AUTO: 0.1 % — SIGNIFICANT CHANGE UP (ref 0–2)
EOSINOPHIL # BLD AUTO: 0 K/UL — SIGNIFICANT CHANGE UP (ref 0–0.5)
EOSINOPHIL NFR BLD AUTO: 0 % — SIGNIFICANT CHANGE UP (ref 0–6)
HCT VFR BLD CALC: 38.6 % — LOW (ref 39–50)
HGB BLD-MCNC: 12 G/DL — LOW (ref 13–17)
IMM GRANULOCYTES NFR BLD AUTO: 0.9 % — SIGNIFICANT CHANGE UP (ref 0–0.9)
LYMPHOCYTES # BLD AUTO: 0.38 K/UL — LOW (ref 1–3.3)
LYMPHOCYTES # BLD AUTO: 4.5 % — LOW (ref 13–44)
MCHC RBC-ENTMCNC: 31.1 G/DL — LOW (ref 32–36)
MCHC RBC-ENTMCNC: 34.3 PG — HIGH (ref 27–34)
MCV RBC AUTO: 110.3 FL — HIGH (ref 80–100)
MONOCYTES # BLD AUTO: 0.2 K/UL — SIGNIFICANT CHANGE UP (ref 0–0.9)
MONOCYTES NFR BLD AUTO: 2.4 % — SIGNIFICANT CHANGE UP (ref 2–14)
NEUTROPHILS # BLD AUTO: 7.84 K/UL — HIGH (ref 1.8–7.4)
NEUTROPHILS NFR BLD AUTO: 92.1 % — HIGH (ref 43–77)
NRBC # BLD: 0 /100 WBCS — SIGNIFICANT CHANGE UP (ref 0–0)
PLATELET # BLD AUTO: 218 K/UL — SIGNIFICANT CHANGE UP (ref 150–400)
RBC # BLD: 3.5 M/UL — LOW (ref 4.2–5.8)
RBC # FLD: 13.8 % — SIGNIFICANT CHANGE UP (ref 10.3–14.5)
WBC # BLD: 8.51 K/UL — SIGNIFICANT CHANGE UP (ref 3.8–10.5)
WBC # FLD AUTO: 8.51 K/UL — SIGNIFICANT CHANGE UP (ref 3.8–10.5)

## 2023-06-09 LAB
ALBUMIN SERPL ELPH-MCNC: 4.2 G/DL
ALP BLD-CCNC: 136 U/L
ALT SERPL-CCNC: 38 U/L
ANION GAP SERPL CALC-SCNC: 17 MMOL/L
AST SERPL-CCNC: 23 U/L
BILIRUB SERPL-MCNC: 0.3 MG/DL
BUN SERPL-MCNC: 70 MG/DL
CALCIUM SERPL-MCNC: 8.9 MG/DL
CHLORIDE SERPL-SCNC: 111 MMOL/L
CO2 SERPL-SCNC: 16 MMOL/L
CREAT SERPL-MCNC: 3.73 MG/DL
EGFR: 18 ML/MIN/1.73M2
GLUCOSE SERPL-MCNC: 141 MG/DL
POTASSIUM SERPL-SCNC: 5 MMOL/L
PROT SERPL-MCNC: 6.5 G/DL
SODIUM SERPL-SCNC: 145 MMOL/L

## 2023-06-13 ENCOUNTER — NON-APPOINTMENT (OUTPATIENT)
Age: 58
End: 2023-06-13

## 2023-06-21 ENCOUNTER — RESULT REVIEW (OUTPATIENT)
Age: 58
End: 2023-06-21

## 2023-06-21 ENCOUNTER — APPOINTMENT (OUTPATIENT)
Dept: HEMATOLOGY ONCOLOGY | Facility: CLINIC | Age: 58
End: 2023-06-21
Payer: MEDICAID

## 2023-06-21 VITALS
BODY MASS INDEX: 32.51 KG/M2 | DIASTOLIC BLOOD PRESSURE: 83 MMHG | RESPIRATION RATE: 16 BRPM | HEIGHT: 73.78 IN | HEART RATE: 79 BPM | SYSTOLIC BLOOD PRESSURE: 131 MMHG | WEIGHT: 250.64 LBS | TEMPERATURE: 98.2 F | OXYGEN SATURATION: 96 %

## 2023-06-21 LAB
ALBUMIN SERPL ELPH-MCNC: 4.3 G/DL
ALP BLD-CCNC: 89 U/L
ALT SERPL-CCNC: 31 U/L
ANION GAP SERPL CALC-SCNC: 18 MMOL/L
AST SERPL-CCNC: 27 U/L
BASOPHILS # BLD AUTO: 0.01 K/UL — SIGNIFICANT CHANGE UP (ref 0–0.2)
BASOPHILS NFR BLD AUTO: 0.1 % — SIGNIFICANT CHANGE UP (ref 0–2)
BILIRUB SERPL-MCNC: 0.4 MG/DL
BUN SERPL-MCNC: 79 MG/DL
CALCIUM SERPL-MCNC: 9.1 MG/DL
CHLORIDE SERPL-SCNC: 108 MMOL/L
CO2 SERPL-SCNC: 16 MMOL/L
CREAT SERPL-MCNC: 4.05 MG/DL
EGFR: 16 ML/MIN/1.73M2
EOSINOPHIL # BLD AUTO: 0 K/UL — SIGNIFICANT CHANGE UP (ref 0–0.5)
EOSINOPHIL NFR BLD AUTO: 0 % — SIGNIFICANT CHANGE UP (ref 0–6)
GLUCOSE SERPL-MCNC: 124 MG/DL
HCT VFR BLD CALC: 38.4 % — LOW (ref 39–50)
HGB BLD-MCNC: 12.2 G/DL — LOW (ref 13–17)
IMM GRANULOCYTES NFR BLD AUTO: 1.3 % — HIGH (ref 0–0.9)
LDH SERPL-CCNC: 276 U/L
LYMPHOCYTES # BLD AUTO: 0.42 K/UL — LOW (ref 1–3.3)
LYMPHOCYTES # BLD AUTO: 4.4 % — LOW (ref 13–44)
MCHC RBC-ENTMCNC: 31.8 G/DL — LOW (ref 32–36)
MCHC RBC-ENTMCNC: 34.4 PG — HIGH (ref 27–34)
MCV RBC AUTO: 108.2 FL — HIGH (ref 80–100)
MONOCYTES # BLD AUTO: 0.37 K/UL — SIGNIFICANT CHANGE UP (ref 0–0.9)
MONOCYTES NFR BLD AUTO: 3.8 % — SIGNIFICANT CHANGE UP (ref 2–14)
NEUTROPHILS # BLD AUTO: 8.72 K/UL — HIGH (ref 1.8–7.4)
NEUTROPHILS NFR BLD AUTO: 90.4 % — HIGH (ref 43–77)
NRBC # BLD: 0 /100 WBCS — SIGNIFICANT CHANGE UP (ref 0–0)
PLATELET # BLD AUTO: 242 K/UL — SIGNIFICANT CHANGE UP (ref 150–400)
POTASSIUM SERPL-SCNC: 5.1 MMOL/L
PROT SERPL-MCNC: 6.8 G/DL
RBC # BLD: 3.55 M/UL — LOW (ref 4.2–5.8)
RBC # FLD: 13.8 % — SIGNIFICANT CHANGE UP (ref 10.3–14.5)
SODIUM SERPL-SCNC: 142 MMOL/L
T4 FREE SERPL-MCNC: 1 NG/DL
TSH SERPL-ACNC: 0.41 UIU/ML
WBC # BLD: 9.65 K/UL — SIGNIFICANT CHANGE UP (ref 3.8–10.5)
WBC # FLD AUTO: 9.65 K/UL — SIGNIFICANT CHANGE UP (ref 3.8–10.5)

## 2023-06-21 PROCEDURE — 99214 OFFICE O/P EST MOD 30 MIN: CPT

## 2023-06-21 RX ORDER — APIXABAN 5 MG/1
5 TABLET, FILM COATED ORAL
Qty: 60 | Refills: 0 | Status: ACTIVE | COMMUNITY
Start: 1900-01-01 | End: 1900-01-01

## 2023-06-21 RX ORDER — HYDRALAZINE HYDROCHLORIDE 50 MG/1
50 TABLET ORAL
Qty: 60 | Refills: 0 | Status: ACTIVE | COMMUNITY
Start: 2022-03-30 | End: 1900-01-01

## 2023-06-22 LAB
CREAT SPEC-SCNC: 93 MG/DL
CREAT/PROT UR: 0.5 RATIO
PROT UR-MCNC: 46 MG/DL

## 2023-06-22 NOTE — ASSESSMENT
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 Nivolumab started 4/19/22, start of cabozantinib delayed to 5/2/22 d/t insurance issues. Nivolumab stopped after cycle 8 on 11/8/22 d/t grade 3 transaminitis. He continued on single agent cabozantinib 40mg daily until it was held again on 5/2/23 for recurrent transaminitis after prednisone tapered off. \par \par Met RCC:\par - 1/31/23 PET scan done at outside facility, pt brought in the disc at his last visit. I will touch base with radiology to request comparison to his prior PET scan from Sept 2022 done in our system. \par - Off cabozantinib since 5/2/23 for recurrent transaminitis as below, continue to hold until off prednisone. \par \par Transaminitis:\par - He had grade 3 immunotherapy associated transaminitis: 12/15/22 AST = 316, ALT = 289, and alk phos = 446. Solumedrol 500mg IV given 12/16/22 and prednisone 50mg bid started 12/17/22. LFTs completely normalized as of early Jan 2023 and prednisone was tapered off. \par - Found to have recurrent transaminitis on 5/1/23 with AST = 440, ALT = 353, and alk phos = 896. Cabometylx held as of 5/2/23 and he was started on prednisone 60mg daily with subsequent resolution of transaminitis. \par - Currently on prednisone 40mg daily. Instructed to decrease prednisone to 30mg daily as of tomorrow 6/22/23. He will repeat labs next week and I will contact him by phone to review results and give further instructions regarding prednisone taper. \par \par HTN:\par - Now improved off cabozantinib. \par - He is taking amlodipine  10mg as previously prescribed.  \par - He has a BP machine at home. Instructed to check his BP daily and contact the office if SBP >160 and/or DBP >90. checks daily at home, 2 times a day usually\par \par CKD:\par - Baseline Cr is 2.89 - 3.46\par - Cr today is 4.05 with BUN of 79, instructed to hydrate adequately. Urine protein/Cr ratio = 0.5\par - He has a f/u appt with nephrologist Dr. Steven Ann on 7/5/23, instructed to keep this appt. \par \par Instructed to contact our office with any new/worsening symptoms.\par Pt educated regarding plan of care, all questions/concerns addressed to the best of my abilities and his apparent satisfaction.\par Repeat labs on 6/29/23, he prefers to come to the [facility] to have these done. \par F/u in 3wks for close monitoring
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
## 2023-06-22 NOTE — REVIEW OF SYSTEMS
[Recent Change In Weight] : ~T recent weight change [Lower Ext Edema] : lower extremity edema [Diarrhea: Grade 0] : Diarrhea: Grade 0 [Joint Pain] : joint pain [Joint Stiffness] : joint stiffness [Fever] : no fever [Chills] : no chills [Night Sweats] : no night sweats [Fatigue] : no fatigue [Eye Pain] : no eye pain [Dysphagia] : no dysphagia [Odynophagia] : no odynophagia [Mucosal Pain] : no mucosal pain [Chest Pain] : no chest pain [Palpitations] : no palpitations [Shortness Of Breath] : no shortness of breath [Cough] : no cough [Abdominal Pain] : no abdominal pain [Vomiting] : no vomiting [Constipation] : no constipation [Dysuria] : no dysuria [Incontinence] : no incontinence [Muscle Pain] : no muscle pain [Skin Rash] : no skin rash [Dizziness] : no dizziness [Muscle Weakness] : no muscle weakness [Easy Bleeding] : no tendency for easy bleeding [Easy Bruising] : no tendency for easy bruising

## 2023-06-22 NOTE — PHYSICAL EXAM
[Fully active, able to carry on all pre-disease performance without restriction] : Status 0 - Fully active, able to carry on all pre-disease performance without restriction [Normal] : affect appropriate [de-identified] : trace edema of BLEs [de-identified] : anicteric

## 2023-06-23 ENCOUNTER — OUTPATIENT (OUTPATIENT)
Dept: OUTPATIENT SERVICES | Facility: HOSPITAL | Age: 58
LOS: 1 days | Discharge: HOME | End: 2023-06-23

## 2023-06-23 DIAGNOSIS — Z90.5 ACQUIRED ABSENCE OF KIDNEY: Chronic | ICD-10-CM

## 2023-06-23 DIAGNOSIS — Z98.89 OTHER SPECIFIED POSTPROCEDURAL STATES: Chronic | ICD-10-CM

## 2023-06-23 DIAGNOSIS — C64.9 MALIGNANT NEOPLASM OF UNSPECIFIED KIDNEY, EXCEPT RENAL PELVIS: ICD-10-CM

## 2023-07-03 ENCOUNTER — APPOINTMENT (OUTPATIENT)
Dept: HEMATOLOGY ONCOLOGY | Facility: CLINIC | Age: 58
End: 2023-07-03

## 2023-07-11 ENCOUNTER — APPOINTMENT (OUTPATIENT)
Dept: HEMATOLOGY ONCOLOGY | Facility: CLINIC | Age: 58
End: 2023-07-11
Payer: MEDICAID

## 2023-07-11 DIAGNOSIS — Z00.00 ENCOUNTER FOR GENERAL ADULT MEDICAL EXAMINATION W/OUT ABNORMAL FINDINGS: ICD-10-CM

## 2023-07-13 ENCOUNTER — RESULT REVIEW (OUTPATIENT)
Age: 58
End: 2023-07-13

## 2023-07-13 ENCOUNTER — APPOINTMENT (OUTPATIENT)
Dept: HEMATOLOGY ONCOLOGY | Facility: CLINIC | Age: 58
End: 2023-07-13

## 2023-07-13 LAB
BASOPHILS # BLD AUTO: 0.01 K/UL — SIGNIFICANT CHANGE UP (ref 0–0.2)
BASOPHILS NFR BLD AUTO: 0.1 % — SIGNIFICANT CHANGE UP (ref 0–2)
EOSINOPHIL # BLD AUTO: 0 K/UL — SIGNIFICANT CHANGE UP (ref 0–0.5)
EOSINOPHIL NFR BLD AUTO: 0 % — SIGNIFICANT CHANGE UP (ref 0–6)
HCT VFR BLD CALC: 38.3 % — LOW (ref 39–50)
HGB BLD-MCNC: 12 G/DL — LOW (ref 13–17)
IMM GRANULOCYTES NFR BLD AUTO: 0.6 % — SIGNIFICANT CHANGE UP (ref 0–0.9)
LYMPHOCYTES # BLD AUTO: 0.35 K/UL — LOW (ref 1–3.3)
LYMPHOCYTES # BLD AUTO: 4.3 % — LOW (ref 13–44)
MCHC RBC-ENTMCNC: 31.3 G/DL — LOW (ref 32–36)
MCHC RBC-ENTMCNC: 33.5 PG — SIGNIFICANT CHANGE UP (ref 27–34)
MCV RBC AUTO: 107 FL — HIGH (ref 80–100)
MONOCYTES # BLD AUTO: 0.18 K/UL — SIGNIFICANT CHANGE UP (ref 0–0.9)
MONOCYTES NFR BLD AUTO: 2.2 % — SIGNIFICANT CHANGE UP (ref 2–14)
NEUTROPHILS # BLD AUTO: 7.46 K/UL — HIGH (ref 1.8–7.4)
NEUTROPHILS NFR BLD AUTO: 92.8 % — HIGH (ref 43–77)
NRBC # BLD: 0 /100 WBCS — SIGNIFICANT CHANGE UP (ref 0–0)
PLATELET # BLD AUTO: 202 K/UL — SIGNIFICANT CHANGE UP (ref 150–400)
RBC # BLD: 3.58 M/UL — LOW (ref 4.2–5.8)
RBC # FLD: 13.8 % — SIGNIFICANT CHANGE UP (ref 10.3–14.5)
WBC # BLD: 8.05 K/UL — SIGNIFICANT CHANGE UP (ref 3.8–10.5)
WBC # FLD AUTO: 8.05 K/UL — SIGNIFICANT CHANGE UP (ref 3.8–10.5)

## 2023-07-14 LAB
ALBUMIN SERPL ELPH-MCNC: 4.4 G/DL
ALP BLD-CCNC: 64 U/L
ALT SERPL-CCNC: 22 U/L
ANION GAP SERPL CALC-SCNC: 17 MMOL/L
AST SERPL-CCNC: 27 U/L
BILIRUB SERPL-MCNC: 0.4 MG/DL
BUN SERPL-MCNC: 62 MG/DL
CALCIUM SERPL-MCNC: 9 MG/DL
CHLORIDE SERPL-SCNC: 107 MMOL/L
CO2 SERPL-SCNC: 18 MMOL/L
CREAT SERPL-MCNC: 4.49 MG/DL
EGFR: 14 ML/MIN/1.73M2
GLUCOSE SERPL-MCNC: 125 MG/DL
LDH SERPL-CCNC: 253 U/L
POTASSIUM SERPL-SCNC: 4.8 MMOL/L
PROT SERPL-MCNC: 6.6 G/DL
SODIUM SERPL-SCNC: 141 MMOL/L
TSH SERPL-ACNC: 0.47 UIU/ML

## 2023-07-20 ENCOUNTER — APPOINTMENT (OUTPATIENT)
Dept: HEMATOLOGY ONCOLOGY | Facility: CLINIC | Age: 58
End: 2023-07-20
Payer: MEDICAID

## 2023-07-20 VITALS
SYSTOLIC BLOOD PRESSURE: 128 MMHG | BODY MASS INDEX: 33.97 KG/M2 | WEIGHT: 263.01 LBS | HEART RATE: 74 BPM | OXYGEN SATURATION: 96 % | DIASTOLIC BLOOD PRESSURE: 80 MMHG | RESPIRATION RATE: 16 BRPM | TEMPERATURE: 97.5 F

## 2023-07-20 LAB
ALBUMIN SERPL ELPH-MCNC: 4.2 G/DL
ALP BLD-CCNC: 57 U/L
ALT SERPL-CCNC: 37 U/L
ANION GAP SERPL CALC-SCNC: 13 MMOL/L
AST SERPL-CCNC: 34 U/L
BILIRUB SERPL-MCNC: 0.4 MG/DL
BUN SERPL-MCNC: 70 MG/DL
CALCIUM SERPL-MCNC: 8.7 MG/DL
CHLORIDE SERPL-SCNC: 108 MMOL/L
CO2 SERPL-SCNC: 18 MMOL/L
CREAT SERPL-MCNC: 4.21 MG/DL
EGFR: 16 ML/MIN/1.73M2
GLUCOSE SERPL-MCNC: 125 MG/DL
POTASSIUM SERPL-SCNC: 5.1 MMOL/L
PROT SERPL-MCNC: 6.5 G/DL
SODIUM SERPL-SCNC: 140 MMOL/L

## 2023-07-20 PROCEDURE — 99214 OFFICE O/P EST MOD 30 MIN: CPT

## 2023-07-20 RX ORDER — CABOZANTINIB 40 MG/1
40 TABLET ORAL
Qty: 30 | Refills: 5 | Status: DISCONTINUED | COMMUNITY
Start: 2022-04-13 | End: 2023-07-20

## 2023-07-21 NOTE — ASSESSMENT
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 Nivolumab started 4/19/22, start of cabozantinib delayed to 5/2/22 d/t insurance issues. Nivolumab stopped after cycle 8 on 11/8/22 d/t grade 3 transaminitis. He continued on single agent cabozantinib 40mg daily until it was held again on 5/2/23 for recurrent transaminitis after prednisone tapered off. \par \par Met RCC:\par - Off cabozantinib since 5/2/23 for recurrent transaminitis as below, continue to hold until off prednisone. He will need restaging scans prior to resuming treatment. \par \par Transaminitis:\par - He had grade 3 immunotherapy associated transaminitis: 12/15/22 AST = 316, ALT = 289, and alk phos = 446. Solumedrol 500mg IV given 12/16/22 and prednisone 50mg bid started 12/17/22. LFTs completely normalized as of early Jan 2023 and prednisone was tapered off. \par - Found to have recurrent transaminitis on 5/1/23 with AST = 440, ALT = 353, and alk phos = 896. Cabometylx held as of 5/2/23 and he was started on prednisone 60mg daily with subsequent resolution of transaminitis. \par - Currently on prednisone 30mg daily. 7/13/23 LFTs WNL, instructed to decrease to 20mg daily. \par \par HTN:\par - Now improved off cabozantinib. \par - He is taking amlodipine  10mg as previously prescribed.  \par - He has a BP machine at home. Instructed to check his BP daily and contact the office if SBP >160 and/or DBP >90. checks daily at home, 2 times a day usually\par \par CKD:\par - Baseline Cr is 3 - 3.8\par - 7/13/23 Cr = 4.49\par - Repeat Cr = 4.21 today, he reportedly last saw his nephrologist Dr. Steven Ann on 7/5 and labs were reportedly fine at that time with no change in management. I will reach out to Dr. Ann's office to make him aware of the rising Cr. \par \par Instructed to contact our office with any new/worsening symptoms.\par Pt educated regarding plan of care, all questions/concerns addressed to the best of my abilities and his apparent satisfaction.\par RTC next 7/27 for repeat labs, plan to further taper prednisone if LFTs stable at that time.\par F/u with provider in 2wks.
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
## 2023-07-21 NOTE — REVIEW OF SYSTEMS
[Recent Change In Weight] : ~T recent weight change [Lower Ext Edema] : lower extremity edema [Diarrhea: Grade 0] : Diarrhea: Grade 0 [Fever] : no fever [Chills] : no chills [Night Sweats] : no night sweats [Fatigue] : no fatigue [Eye Pain] : no eye pain [Dysphagia] : no dysphagia [Odynophagia] : no odynophagia [Mucosal Pain] : no mucosal pain [Chest Pain] : no chest pain [Palpitations] : no palpitations [Shortness Of Breath] : no shortness of breath [Cough] : no cough [Abdominal Pain] : no abdominal pain [Vomiting] : no vomiting [Constipation] : no constipation [Dysuria] : no dysuria [Incontinence] : no incontinence [Joint Pain] : no joint pain [Joint Stiffness] : no joint stiffness [Muscle Pain] : no muscle pain [Skin Rash] : no skin rash [Dizziness] : no dizziness [Hot Flashes] : no hot flashes [Muscle Weakness] : no muscle weakness [Easy Bleeding] : no tendency for easy bleeding [Easy Bruising] : no tendency for easy bruising

## 2023-07-21 NOTE — PHYSICAL EXAM
[Fully active, able to carry on all pre-disease performance without restriction] : Status 0 - Fully active, able to carry on all pre-disease performance without restriction [Normal] : affect appropriate [de-identified] : anicteric  [de-identified] : +1 edema of BLEs up to pre-tibia

## 2023-07-27 ENCOUNTER — APPOINTMENT (OUTPATIENT)
Dept: HEMATOLOGY ONCOLOGY | Facility: CLINIC | Age: 58
End: 2023-07-27

## 2023-08-03 ENCOUNTER — APPOINTMENT (OUTPATIENT)
Dept: HEMATOLOGY ONCOLOGY | Facility: CLINIC | Age: 58
End: 2023-08-03

## 2023-08-04 ENCOUNTER — NON-APPOINTMENT (OUTPATIENT)
Age: 58
End: 2023-08-04

## 2023-08-17 ENCOUNTER — RESULT REVIEW (OUTPATIENT)
Age: 58
End: 2023-08-17

## 2023-08-17 ENCOUNTER — APPOINTMENT (OUTPATIENT)
Dept: HEMATOLOGY ONCOLOGY | Facility: CLINIC | Age: 58
End: 2023-08-17
Payer: MEDICAID

## 2023-08-17 VITALS
BODY MASS INDEX: 33.2 KG/M2 | DIASTOLIC BLOOD PRESSURE: 85 MMHG | RESPIRATION RATE: 16 BRPM | OXYGEN SATURATION: 97 % | TEMPERATURE: 97.9 F | SYSTOLIC BLOOD PRESSURE: 123 MMHG | HEART RATE: 80 BPM | WEIGHT: 257.04 LBS

## 2023-08-17 LAB
ALBUMIN SERPL ELPH-MCNC: 4.3 G/DL
ALP BLD-CCNC: 66 U/L
ALT SERPL-CCNC: 16 U/L
ANION GAP SERPL CALC-SCNC: 15 MMOL/L
AST SERPL-CCNC: 20 U/L
BASOPHILS # BLD AUTO: 0.02 K/UL — SIGNIFICANT CHANGE UP (ref 0–0.2)
BASOPHILS NFR BLD AUTO: 0.3 % — SIGNIFICANT CHANGE UP (ref 0–2)
BILIRUB SERPL-MCNC: 0.5 MG/DL
BUN SERPL-MCNC: 60 MG/DL
CALCIUM SERPL-MCNC: 9 MG/DL
CHLORIDE SERPL-SCNC: 109 MMOL/L
CO2 SERPL-SCNC: 18 MMOL/L
CREAT SERPL-MCNC: 4.12 MG/DL
EGFR: 16 ML/MIN/1.73M2
EOSINOPHIL # BLD AUTO: 0 K/UL — SIGNIFICANT CHANGE UP (ref 0–0.5)
EOSINOPHIL NFR BLD AUTO: 0 % — SIGNIFICANT CHANGE UP (ref 0–6)
GLUCOSE SERPL-MCNC: 94 MG/DL
HCT VFR BLD CALC: 39.9 % — SIGNIFICANT CHANGE UP (ref 39–50)
HGB BLD-MCNC: 12.6 G/DL — LOW (ref 13–17)
IMM GRANULOCYTES NFR BLD AUTO: 0.8 % — SIGNIFICANT CHANGE UP (ref 0–0.9)
LDH SERPL-CCNC: 250 U/L
LYMPHOCYTES # BLD AUTO: 0.65 K/UL — LOW (ref 1–3.3)
LYMPHOCYTES # BLD AUTO: 8.2 % — LOW (ref 13–44)
MCHC RBC-ENTMCNC: 31.6 G/DL — LOW (ref 32–36)
MCHC RBC-ENTMCNC: 33.5 PG — SIGNIFICANT CHANGE UP (ref 27–34)
MCV RBC AUTO: 106.1 FL — HIGH (ref 80–100)
MONOCYTES # BLD AUTO: 0.54 K/UL — SIGNIFICANT CHANGE UP (ref 0–0.9)
MONOCYTES NFR BLD AUTO: 6.8 % — SIGNIFICANT CHANGE UP (ref 2–14)
NEUTROPHILS # BLD AUTO: 6.64 K/UL — SIGNIFICANT CHANGE UP (ref 1.8–7.4)
NEUTROPHILS NFR BLD AUTO: 83.9 % — HIGH (ref 43–77)
NRBC # BLD: 0 /100 WBCS — SIGNIFICANT CHANGE UP (ref 0–0)
PLATELET # BLD AUTO: 241 K/UL — SIGNIFICANT CHANGE UP (ref 150–400)
POTASSIUM SERPL-SCNC: 4.7 MMOL/L
PROT SERPL-MCNC: 6.9 G/DL
RBC # BLD: 3.76 M/UL — LOW (ref 4.2–5.8)
RBC # FLD: 13.4 % — SIGNIFICANT CHANGE UP (ref 10.3–14.5)
SODIUM SERPL-SCNC: 142 MMOL/L
TSH SERPL-ACNC: 0.73 UIU/ML
WBC # BLD: 7.91 K/UL — SIGNIFICANT CHANGE UP (ref 3.8–10.5)
WBC # FLD AUTO: 7.91 K/UL — SIGNIFICANT CHANGE UP (ref 3.8–10.5)

## 2023-08-17 PROCEDURE — 99214 OFFICE O/P EST MOD 30 MIN: CPT

## 2023-08-17 RX ORDER — SODIUM BICARBONATE 650 MG/1
650 TABLET ORAL TWICE DAILY
Qty: 60 | Refills: 0 | Status: ACTIVE | COMMUNITY
Start: 2022-02-10 | End: 1900-01-01
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
## 2023-08-18 NOTE — PHYSICAL EXAM
[Fully active, able to carry on all pre-disease performance without restriction] : Status 0 - Fully active, able to carry on all pre-disease performance without restriction [Normal] : affect appropriate [de-identified] : anicteric  [de-identified] : +1 edema of BLEs

## 2023-08-18 NOTE — ASSESSMENT
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 Nivolumab started 4/19/22, start of cabozantinib delayed to 5/2/22 d/t insurance issues. Nivolumab stopped after cycle 8 on 11/8/22 d/t grade 3 transaminitis. He continued on single agent cabozantinib 40mg daily until it was held again on 5/2/23 for recurrent transaminitis after prednisone tapered off.   Met RCC: - Off cabozantinib since 5/2/23 for recurrent transaminitis as below, continue to hold until off prednisone.  - Will order restaging PET scan to be done in 3wks.   Transaminitis: - He had grade 3 immunotherapy associated transaminitis: 12/15/22 AST = 316, ALT = 289, and alk phos = 446. Solumedrol 500mg IV given 12/16/22 and prednisone 50mg bid started 12/17/22. LFTs completely normalized as of early Jan 2023 and prednisone was tapered off.  - Found to have recurrent transaminitis on 5/1/23 with AST = 440, ALT = 353, and alk phos = 896. Cabometylx held as of 5/2/23 and he was started on prednisone 60mg daily with subsequent resolution of transaminitis.  - Currently on prednisone 10mg daily as of 8/5/23. Instructed to decrease prednisone to 5mg daily x 10 days then 5mg every other day x 10 days then discontinue.   HTN: - Now improved off cabozantinib.  - He is taking amlodipine 10mg as previously prescribed.   - He has a BP machine at home. Instructed to check his BP daily and contact the office if SBP >160 and/or DBP >90.  CKD: - Baseline Cr is 3 - 3.8 - 7/13/23 Cr = 4.49 - Repeat Cr = 4.12 today, improved.  - Continue f/u with nephrology  Instructed to contact our office with any new/worsening symptoms. Pt educated regarding plan of care, all questions/concerns addressed to the best of my abilities and his apparent satisfaction. F/u in 1 month

## 2023-08-18 NOTE — REVIEW OF SYSTEMS
[Lower Ext Edema] : lower extremity edema [Diarrhea: Grade 0] : Diarrhea: Grade 0 [Joint Pain] : joint pain [Fever] : no fever [Chills] : no chills [Night Sweats] : no night sweats [Fatigue] : no fatigue [Eye Pain] : no eye pain [Vision Problems] : no vision problems [Dysphagia] : no dysphagia [Odynophagia] : no odynophagia [Mucosal Pain] : no mucosal pain [Chest Pain] : no chest pain [Palpitations] : no palpitations [Shortness Of Breath] : no shortness of breath [Cough] : no cough [Abdominal Pain] : no abdominal pain [Vomiting] : no vomiting [Constipation] : no constipation [Dysuria] : no dysuria [Incontinence] : no incontinence [Joint Stiffness] : no joint stiffness [Muscle Pain] : no muscle pain [Muscle Weakness] : no muscle weakness [Skin Rash] : no skin rash [Dizziness] : no dizziness [Easy Bleeding] : no tendency for easy bleeding [Easy Bruising] : no tendency for easy bruising

## 2023-09-11 ENCOUNTER — OUTPATIENT (OUTPATIENT)
Dept: OUTPATIENT SERVICES | Facility: HOSPITAL | Age: 58
LOS: 1 days | Discharge: HOME | End: 2023-09-11

## 2023-09-11 DIAGNOSIS — Z90.5 ACQUIRED ABSENCE OF KIDNEY: Chronic | ICD-10-CM

## 2023-09-11 DIAGNOSIS — C64.9 MALIGNANT NEOPLASM OF UNSPECIFIED KIDNEY, EXCEPT RENAL PELVIS: ICD-10-CM

## 2023-09-11 DIAGNOSIS — Z98.89 OTHER SPECIFIED POSTPROCEDURAL STATES: Chronic | ICD-10-CM

## 2023-09-15 PROBLEM — K75.4 AUTOIMMUNE HEPATITIS: Status: ACTIVE | Noted: 2022-12-08

## 2023-09-15 PROBLEM — Z29.8 IMMUNOTHERAPY: Status: ACTIVE | Noted: 2022-05-14

## 2023-09-20 ENCOUNTER — RESULT REVIEW (OUTPATIENT)
Age: 58
End: 2023-09-20

## 2023-09-20 ENCOUNTER — APPOINTMENT (OUTPATIENT)
Dept: HEMATOLOGY ONCOLOGY | Facility: CLINIC | Age: 58
End: 2023-09-20
Payer: MEDICAID

## 2023-09-20 VITALS
DIASTOLIC BLOOD PRESSURE: 76 MMHG | BODY MASS INDEX: 32.46 KG/M2 | SYSTOLIC BLOOD PRESSURE: 123 MMHG | TEMPERATURE: 98.2 F | RESPIRATION RATE: 16 BRPM | WEIGHT: 251.33 LBS | HEART RATE: 82 BPM | OXYGEN SATURATION: 97 %

## 2023-09-20 DIAGNOSIS — D63.0 ANEMIA IN NEOPLASTIC DISEASE: ICD-10-CM

## 2023-09-20 DIAGNOSIS — Z29.8 ENCOUNTER FOR OTHER SPECIFIED PROPHYLACTIC MEASURES: ICD-10-CM

## 2023-09-20 DIAGNOSIS — K75.4 AUTOIMMUNE HEPATITIS: ICD-10-CM

## 2023-09-20 LAB
BASOPHILS # BLD AUTO: 0 K/UL — SIGNIFICANT CHANGE UP (ref 0–0.2)
BASOPHILS NFR BLD AUTO: 0 % — SIGNIFICANT CHANGE UP (ref 0–2)
EOSINOPHIL # BLD AUTO: 0 K/UL — SIGNIFICANT CHANGE UP (ref 0–0.5)
EOSINOPHIL NFR BLD AUTO: 0 % — SIGNIFICANT CHANGE UP (ref 0–6)
HCT VFR BLD CALC: 37.1 % — LOW (ref 39–50)
HGB BLD-MCNC: 11.8 G/DL — LOW (ref 13–17)
IMM GRANULOCYTES NFR BLD AUTO: 0.9 % — SIGNIFICANT CHANGE UP (ref 0–0.9)
LYMPHOCYTES # BLD AUTO: 0.32 K/UL — LOW (ref 1–3.3)
LYMPHOCYTES # BLD AUTO: 3.6 % — LOW (ref 13–44)
MCHC RBC-ENTMCNC: 31.8 G/DL — LOW (ref 32–36)
MCHC RBC-ENTMCNC: 32.4 PG — SIGNIFICANT CHANGE UP (ref 27–34)
MCV RBC AUTO: 101.9 FL — HIGH (ref 80–100)
MONOCYTES # BLD AUTO: 0.19 K/UL — SIGNIFICANT CHANGE UP (ref 0–0.9)
MONOCYTES NFR BLD AUTO: 2.1 % — SIGNIFICANT CHANGE UP (ref 2–14)
NEUTROPHILS # BLD AUTO: 8.35 K/UL — HIGH (ref 1.8–7.4)
NEUTROPHILS NFR BLD AUTO: 93.4 % — HIGH (ref 43–77)
NRBC # BLD: 0 /100 WBCS — SIGNIFICANT CHANGE UP (ref 0–0)
PLATELET # BLD AUTO: 292 K/UL — SIGNIFICANT CHANGE UP (ref 150–400)
RBC # BLD: 3.64 M/UL — LOW (ref 4.2–5.8)
RBC # FLD: 13.6 % — SIGNIFICANT CHANGE UP (ref 10.3–14.5)
WBC # BLD: 8.94 K/UL — SIGNIFICANT CHANGE UP (ref 3.8–10.5)
WBC # FLD AUTO: 8.94 K/UL — SIGNIFICANT CHANGE UP (ref 3.8–10.5)

## 2023-09-20 PROCEDURE — 99214 OFFICE O/P EST MOD 30 MIN: CPT

## 2023-09-20 RX ORDER — PREDNISONE 5 MG/1
5 TABLET ORAL
Qty: 15 | Refills: 0 | Status: DISCONTINUED | COMMUNITY
Start: 2023-05-02 | End: 2023-09-20

## 2023-09-21 LAB
ALBUMIN SERPL ELPH-MCNC: 4.2 G/DL
ALP BLD-CCNC: 84 U/L
ALT SERPL-CCNC: 19 U/L
ANION GAP SERPL CALC-SCNC: 18 MMOL/L
AST SERPL-CCNC: 20 U/L
BILIRUB SERPL-MCNC: 0.4 MG/DL
BUN SERPL-MCNC: 81 MG/DL
CALCIUM SERPL-MCNC: 9.2 MG/DL
CHLORIDE SERPL-SCNC: 105 MMOL/L
CO2 SERPL-SCNC: 17 MMOL/L
CREAT SERPL-MCNC: 4.23 MG/DL
EGFR: 15 ML/MIN/1.73M2
FOLATE SERPL-MCNC: 12.2 NG/ML
GLUCOSE SERPL-MCNC: 126 MG/DL
LDH SERPL-CCNC: 212 U/L
POTASSIUM SERPL-SCNC: 4.7 MMOL/L
PROT SERPL-MCNC: 6.7 G/DL
SODIUM SERPL-SCNC: 140 MMOL/L
TSH SERPL-ACNC: 0.56 UIU/ML
VIT B12 SERPL-MCNC: 697 PG/ML

## 2023-10-23 PROBLEM — R91.8 PULMONARY NODULES: Status: ACTIVE | Noted: 2021-12-06

## 2023-10-23 PROBLEM — R74.01 TRANSAMINITIS: Status: ACTIVE | Noted: 2022-12-08

## 2023-10-23 PROBLEM — C64.9 METASTATIC RENAL CELL CARCINOMA: Status: ACTIVE | Noted: 2021-12-06

## 2023-10-23 PROBLEM — R59.0 LYMPHADENOPATHY, RETROPERITONEAL: Status: ACTIVE | Noted: 2021-12-06

## 2023-10-23 PROBLEM — Z92.21 HISTORY OF ANTINEOPLASTIC THERAPY: Status: ACTIVE | Noted: 2023-07-09

## 2023-10-23 PROBLEM — C78.6 CANCER OF PERITONEUM/RETROPERITONEUM, SECONDARY: Status: ACTIVE | Noted: 2021-12-06

## 2023-10-23 PROBLEM — N18.30 CHRONIC RENAL INSUFFICIENCY, STAGE III (MODERATE): Status: ACTIVE | Noted: 2021-12-06

## 2023-10-25 ENCOUNTER — APPOINTMENT (OUTPATIENT)
Dept: NUCLEAR MEDICINE | Facility: IMAGING CENTER | Age: 58
End: 2023-10-25

## 2023-10-25 ENCOUNTER — APPOINTMENT (OUTPATIENT)
Dept: HEMATOLOGY ONCOLOGY | Facility: CLINIC | Age: 58
End: 2023-10-25

## 2023-10-25 DIAGNOSIS — R74.01 ELEVATION OF LEVELS OF LIVER TRANSAMINASE LEVELS: ICD-10-CM

## 2023-10-25 DIAGNOSIS — N18.30 CHRONIC KIDNEY DISEASE, STAGE 3 UNSPECIFIED: ICD-10-CM

## 2023-10-25 DIAGNOSIS — R91.8 OTHER NONSPECIFIC ABNORMAL FINDING OF LUNG FIELD: ICD-10-CM

## 2023-10-25 DIAGNOSIS — R59.0 LOCALIZED ENLARGED LYMPH NODES: ICD-10-CM

## 2023-10-25 DIAGNOSIS — C64.9 MALIGNANT NEOPLASM OF UNSPECIFIED KIDNEY, EXCEPT RENAL PELVIS: ICD-10-CM

## 2023-10-25 DIAGNOSIS — Z92.21 PERSONAL HISTORY OF ANTINEOPLASTIC CHEMOTHERAPY: ICD-10-CM

## 2023-10-25 DIAGNOSIS — C78.6 SECONDARY MALIGNANT NEOPLASM OF RETROPERITONEUM AND PERITONEUM: ICD-10-CM

## 2024-04-17 NOTE — REVIEW OF SYSTEMS
[place] to Dr. Hurd clinical support [place]. Dr. Hurd Patient   [Fatigue] : fatigue [Recent Change In Weight] : ~T recent weight change [Lower Ext Edema] : lower extremity edema [Easy Bruising] : a tendency for easy bruising [Negative] : Gastrointestinal [Fever] : no fever [Chills] : no chills [Chest Pain] : no chest pain [Palpitations] : no palpitations [Wheezing] : no wheezing [Cough] : no cough [Dysuria] : no dysuria [Incontinence] : no incontinence [Joint Pain] : no joint pain [Joint Stiffness] : no joint stiffness [Muscle Pain] : no muscle pain [Skin Rash] : no skin rash [Dizziness] : no dizziness [Anxiety] : no anxiety [Depression] : no depression [Muscle Weakness] : no muscle weakness [Easy Bleeding] : no tendency for easy bleeding [FreeTextEntry9] : no cramps [de-identified] : No HA, no paresthesias